# Patient Record
Sex: MALE | Race: OTHER | Employment: FULL TIME | URBAN - METROPOLITAN AREA
[De-identification: names, ages, dates, MRNs, and addresses within clinical notes are randomized per-mention and may not be internally consistent; named-entity substitution may affect disease eponyms.]

---
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
## 2018-11-03 ENCOUNTER — HOSPITAL ENCOUNTER (EMERGENCY)
Age: 18
Discharge: HOME | End: 2018-11-03
Payer: COMMERCIAL

## 2018-11-03 VITALS
TEMPERATURE: 99.6 F | RESPIRATION RATE: 16 BRPM | WEIGHT: 140 LBS | SYSTOLIC BLOOD PRESSURE: 134 MMHG | HEART RATE: 105 BPM | HEIGHT: 67 IN | DIASTOLIC BLOOD PRESSURE: 44 MMHG | OXYGEN SATURATION: 98 % | BODY MASS INDEX: 21.97 KG/M2

## 2018-11-03 DIAGNOSIS — J02.9 ACUTE PHARYNGITIS, UNSPECIFIED ETIOLOGY: ICD-10-CM

## 2018-11-03 DIAGNOSIS — J06.9 ACUTE UPPER RESPIRATORY INFECTION: Primary | ICD-10-CM

## 2018-11-03 LAB — S PYO AG THROAT QL: NEGATIVE

## 2018-11-03 PROCEDURE — 87081 CULTURE SCREEN ONLY: CPT

## 2018-11-03 PROCEDURE — 99283 EMERGENCY DEPT VISIT LOW MDM: CPT

## 2018-11-03 PROCEDURE — 87880 STREP A ASSAY W/OPTIC: CPT

## 2018-11-03 RX ORDER — IBUPROFEN 800 MG/1
800 TABLET ORAL EVERY 8 HOURS PRN
Qty: 20 TABLET | Refills: 0 | Status: SHIPPED | OUTPATIENT
Start: 2018-11-03 | End: 2022-10-15

## 2018-11-03 RX ORDER — AZITHROMYCIN 250 MG/1
TABLET, FILM COATED ORAL
Qty: 6 TABLET | Refills: 0 | Status: SHIPPED | OUTPATIENT
Start: 2018-11-03 | End: 2018-11-13

## 2018-11-03 ASSESSMENT — ENCOUNTER SYMPTOMS
ABDOMINAL PAIN: 0
SORE THROAT: 1
BACK PAIN: 0
DIARRHEA: 0
COUGH: 1
SINUS PAIN: 0
VOMITING: 0
NAUSEA: 0
WHEEZING: 0
TROUBLE SWALLOWING: 0
SHORTNESS OF BREATH: 0

## 2018-11-03 ASSESSMENT — PAIN DESCRIPTION - DESCRIPTORS: DESCRIPTORS: SORE

## 2018-11-03 ASSESSMENT — PAIN SCALES - GENERAL: PAINLEVEL_OUTOF10: 6

## 2018-11-03 ASSESSMENT — PAIN DESCRIPTION - PROGRESSION: CLINICAL_PROGRESSION: GRADUALLY WORSENING

## 2018-11-03 ASSESSMENT — PAIN DESCRIPTION - PAIN TYPE: TYPE: ACUTE PAIN

## 2018-11-03 ASSESSMENT — PAIN DESCRIPTION - LOCATION: LOCATION: THROAT

## 2018-11-03 ASSESSMENT — PAIN DESCRIPTION - ONSET: ONSET: ON-GOING

## 2018-11-03 ASSESSMENT — PAIN DESCRIPTION - FREQUENCY: FREQUENCY: CONTINUOUS

## 2018-11-05 LAB — S PYO THROAT QL CULT: NORMAL

## 2019-06-08 ENCOUNTER — HOSPITAL ENCOUNTER (EMERGENCY)
Age: 19
Discharge: HOME | End: 2019-06-08
Attending: EMERGENCY MEDICINE
Payer: COMMERCIAL

## 2019-06-08 VITALS
RESPIRATION RATE: 18 BRPM | DIASTOLIC BLOOD PRESSURE: 76 MMHG | TEMPERATURE: 97.1 F | SYSTOLIC BLOOD PRESSURE: 125 MMHG | OXYGEN SATURATION: 97 % | HEIGHT: 67 IN | HEART RATE: 78 BPM | WEIGHT: 158 LBS | BODY MASS INDEX: 24.8 KG/M2

## 2019-06-08 DIAGNOSIS — L50.9 URTICARIA: Primary | ICD-10-CM

## 2019-06-08 PROCEDURE — 99282 EMERGENCY DEPT VISIT SF MDM: CPT

## 2019-06-08 RX ORDER — FAMOTIDINE 20 MG/1
20 TABLET, FILM COATED ORAL DAILY
Qty: 15 TABLET | Refills: 0 | Status: SHIPPED | OUTPATIENT
Start: 2019-06-08 | End: 2019-06-23

## 2019-06-08 RX ORDER — PREDNISONE 10 MG/1
50 TABLET ORAL DAILY
Qty: 25 TABLET | Refills: 0 | Status: SHIPPED | OUTPATIENT
Start: 2019-06-08 | End: 2019-06-13

## 2019-06-08 RX ORDER — DIPHENHYDRAMINE HCL 25 MG
25 CAPSULE ORAL EVERY 4 HOURS PRN
Qty: 20 CAPSULE | Refills: 0 | Status: SHIPPED | OUTPATIENT
Start: 2019-06-08 | End: 2019-06-18

## 2019-06-08 ASSESSMENT — ENCOUNTER SYMPTOMS
SHORTNESS OF BREATH: 0
WHEEZING: 0
BACK PAIN: 0
COUGH: 0
ABDOMINAL PAIN: 0

## 2019-06-08 NOTE — ED PROVIDER NOTES
[address] [facility] ED  eMERGENCY dEPARTMENT eNCOUnter      Pt Name: Magaly Escobar  MRN: [id number]  [place] 2000  Date of evaluation: 6/8/2019  Provider: JESICA Molina Ma, CNP      HISTORY OF PRESENT ILLNESS    Magaly Escobar is a 19 y.o. male who presents to the Emergency Department with hives that started this evening. He did start using a new body wash recently. He denies any new foods, medications or products otherwise. He is not having any trouble breathing, SOB or wheezing. He took 50 mg of Benadryl PTA and rash is gone. REVIEW OF SYSTEMS       Review of Systems   Constitutional: Negative for activity change, appetite change and fever. HENT: Negative for congestion. Respiratory: Negative for cough, shortness of breath and wheezing. Cardiovascular: Negative for chest pain. Gastrointestinal: Negative for abdominal pain. Genitourinary: Negative for dysuria. Musculoskeletal: Negative for arthralgias and back pain. Skin: Positive for rash. All other systems reviewed and are negative. PAST MEDICAL HISTORY     Past Medical History:   Diagnosis Date    Acne     Eczema          SURGICAL HISTORY     History reviewed. No pertinent surgical history. CURRENT MEDICATIONS       Previous Medications    IBUPROFEN (IBU) 800 MG TABLET    Take 1 tablet by mouth every 8 hours as needed for Pain       ALLERGIES     Tylenol [acetaminophen]    FAMILY HISTORY     History reviewed. No pertinent family history.        SOCIAL HISTORY       Social History     Socioeconomic History    Marital status: Single     Spouse name: None    Number of children: None    Years of education: None    Highest education level: None   Occupational History    None   Social Needs    Financial resource strain: None    Food insecurity:     Worry: None     Inability: None    Transportation needs:     Medical: None     Non-medical: None   Tobacco Use    Smoking status: Never Smoker    Smokeless or not returned as of this dictation. EMERGENCY DEPARTMENT COURSE and DIFFERENTIALDIAGNOSIS/MDM:   Vitals:    Vitals:    06/08/19 1921   BP: 125/76   Pulse: 78   Resp: 18   Temp: 97.1 °F (36.2 °C)   TempSrc: Oral   SpO2: 97%   Weight: 158 lb (71.7 kg)   Height: 5' 6.5\" (1.689 m)            19 yr old male with urticaria. Prescriptions for Prednisone, Benadryl and Pepcid was given to the patient. F/U with PCP in 2-3 days. Patient verbalizes understanding. PROCEDURES:  Unless otherwise noted below, none     Procedures      FINAL IMPRESSION      1.  Urticaria          DISPOSITION/PLAN   DISPOSITION Decision To Discharge 06/08/2019 07:51:48 PM          JESICA Toth CNP (electronically signed)  Attending Emergency Physician     JESICA Toth CNP  06/08/19 1958

## 2019-06-08 NOTE — ED TRIAGE NOTES
Pt to ER with c/o rash that started a little while ago, rash is on pt back and legs, bumps were bigger but have gone down according to pt friend, pt states that they itch, pt a&ox4, resp even and unlabored

## 2020-10-28 ENCOUNTER — APPOINTMENT (OUTPATIENT)
Dept: GENERAL RADIOLOGY | Age: 20
End: 2020-10-28
Payer: COMMERCIAL

## 2020-10-28 ENCOUNTER — HOSPITAL ENCOUNTER (EMERGENCY)
Age: 20
Discharge: HOME | End: 2020-10-28
Payer: COMMERCIAL

## 2020-10-28 VITALS
SYSTOLIC BLOOD PRESSURE: 113 MMHG | HEIGHT: 67 IN | HEART RATE: 60 BPM | WEIGHT: 186 LBS | DIASTOLIC BLOOD PRESSURE: 76 MMHG | RESPIRATION RATE: 18 BRPM | OXYGEN SATURATION: 98 % | BODY MASS INDEX: 29.19 KG/M2 | TEMPERATURE: 97.4 F

## 2020-10-28 LAB
ABO/RH: NORMAL
ALBUMIN SERPL-MCNC: 4.6 G/DL (ref 3.5–4.6)
ALP BLD-CCNC: 95 U/L (ref 35–104)
ALT SERPL-CCNC: 21 U/L (ref 0–41)
ANION GAP SERPL CALCULATED.3IONS-SCNC: 12 MEQ/L (ref 9–15)
ANTIBODY SCREEN: NORMAL
AST SERPL-CCNC: 23 U/L (ref 0–40)
BASOPHILS ABSOLUTE: 0.1 K/UL (ref 0–0.2)
BASOPHILS RELATIVE PERCENT: 0.8 %
BILIRUB SERPL-MCNC: 0.3 MG/DL (ref 0.2–0.7)
BUN BLDV-MCNC: 19 MG/DL (ref 6–20)
CALCIUM SERPL-MCNC: 9.8 MG/DL (ref 8.5–9.9)
CHLORIDE BLD-SCNC: 104 MEQ/L (ref 95–107)
CO2: 25 MEQ/L (ref 20–31)
CREAT SERPL-MCNC: 0.91 MG/DL (ref 0.7–1.2)
EOSINOPHILS ABSOLUTE: 0.2 K/UL (ref 0–0.7)
EOSINOPHILS RELATIVE PERCENT: 2.8 %
GFR AFRICAN AMERICAN: >60
GFR NON-AFRICAN AMERICAN: >60
GLOBULIN: 2.7 G/DL (ref 2.3–3.5)
GLUCOSE BLD-MCNC: 100 MG/DL (ref 70–99)
HCT VFR BLD CALC: 42.8 % (ref 42–52)
HEMOGLOBIN: 14.4 G/DL (ref 14–18)
LYMPHOCYTES ABSOLUTE: 2.5 K/UL (ref 1–4.8)
LYMPHOCYTES RELATIVE PERCENT: 28.9 %
MCH RBC QN AUTO: 26 PG (ref 27–31.3)
MCHC RBC AUTO-ENTMCNC: 33.7 % (ref 33–37)
MCV RBC AUTO: 77.3 FL (ref 80–100)
MONOCYTES ABSOLUTE: 0.7 K/UL (ref 0.2–0.8)
MONOCYTES RELATIVE PERCENT: 7.9 %
NEUTROPHILS ABSOLUTE: 5.2 K/UL (ref 1.4–6.5)
NEUTROPHILS RELATIVE PERCENT: 59.6 %
PDW BLD-RTO: 13.3 % (ref 11.5–14.5)
PLATELET # BLD: 218 K/UL (ref 130–400)
POTASSIUM SERPL-SCNC: 3.8 MEQ/L (ref 3.4–4.9)
RBC # BLD: 5.54 M/UL (ref 4.7–6.1)
SODIUM BLD-SCNC: 141 MEQ/L (ref 135–144)
TOTAL PROTEIN: 7.3 G/DL (ref 6.3–8)
WBC # BLD: 8.8 K/UL (ref 4.8–10.8)

## 2020-10-28 PROCEDURE — 99283 EMERGENCY DEPT VISIT LOW MDM: CPT

## 2020-10-28 PROCEDURE — 80053 COMPREHEN METABOLIC PANEL: CPT

## 2020-10-28 PROCEDURE — 86901 BLOOD TYPING SEROLOGIC RH(D): CPT

## 2020-10-28 PROCEDURE — 36415 COLL VENOUS BLD VENIPUNCTURE: CPT

## 2020-10-28 PROCEDURE — 86850 RBC ANTIBODY SCREEN: CPT

## 2020-10-28 PROCEDURE — 85025 COMPLETE CBC W/AUTO DIFF WBC: CPT

## 2020-10-28 PROCEDURE — 71045 X-RAY EXAM CHEST 1 VIEW: CPT

## 2020-10-28 PROCEDURE — 86900 BLOOD TYPING SEROLOGIC ABO: CPT

## 2020-10-28 RX ORDER — BENZONATATE 100 MG/1
100 CAPSULE ORAL 3 TIMES DAILY PRN
Qty: 20 CAPSULE | Refills: 0 | Status: SHIPPED | OUTPATIENT
Start: 2020-10-28

## 2020-10-28 RX ORDER — AZITHROMYCIN 250 MG/1
TABLET, FILM COATED ORAL
Qty: 1 PACKET | Refills: 0 | Status: SHIPPED | OUTPATIENT
Start: 2020-10-28 | End: 2020-11-01

## 2020-10-28 SDOH — HEALTH STABILITY: MENTAL HEALTH: HOW OFTEN DO YOU HAVE A DRINK CONTAINING ALCOHOL?: NEVER

## 2020-10-28 ASSESSMENT — ENCOUNTER SYMPTOMS
EYE REDNESS: 0
DIARRHEA: 0
SHORTNESS OF BREATH: 0
BLOOD IN STOOL: 0
CONSTIPATION: 0
EYE DISCHARGE: 0
VOMITING: 0
SORE THROAT: 0
TROUBLE SWALLOWING: 0
WHEEZING: 0
COUGH: 1
ABDOMINAL PAIN: 0
EYE PAIN: 0
COLOR CHANGE: 0
BACK PAIN: 0
NAUSEA: 0
RHINORRHEA: 0

## 2020-10-28 NOTE — ED TRIAGE NOTES
Pt states that he woke up this morning and was coughing up blood. Pt states blood was  Bright red and a small amount.

## 2020-10-28 NOTE — ED PROVIDER NOTES
[address] [facility] ED  EMERGENCY DEPARTMENT ENCOUNTER      Pt Name: Mariela Mireles  MRN: [id number]  [place] 2000  Date of evaluation: 10/28/2020  Provider: JESICA Newman CNP    CHIEF COMPLAINT       Chief Complaint   Patient presents with    Hemoptysis     started today bright red          HISTORY OF PRESENT ILLNESS   (Location/Symptom, Timing/Onset,Context/Setting, Quality, Duration, Modifying Factors, Severity)  Note limiting factors. Mariela Mireles is a 23 y.o. male who presents to the emergency department with a chart reviewed past medical history of eczema and acne for a chief complaint of sudden onset of hemoptysis x2 episodes that started today. Patient states it is bright red and not blood-tinged mucus. He states that prior to that he was coughing but not a lot. He denies any shortness of breath, chest pain, nausea or vomiting. He has no fevers or chills. No night sweats. No other complaints at this time. Nursing Notes were reviewed. REVIEW OF SYSTEMS    (2-9 systems for level 4, 10 or more for level 5)     Review of Systems   Constitutional: Negative for activity change, appetite change, fatigue and fever. HENT: Negative for congestion, ear pain, rhinorrhea, sore throat and trouble swallowing. Eyes: Negative for pain, discharge and redness. Respiratory: Positive for cough. Negative for shortness of breath and wheezing. Cardiovascular: Negative for chest pain and palpitations. Gastrointestinal: Negative for abdominal pain, blood in stool, constipation, diarrhea, nausea and vomiting. Endocrine: Negative for polydipsia and polyuria. Genitourinary: Negative for decreased urine volume, dysuria, flank pain and hematuria. Musculoskeletal: Negative for arthralgias, back pain and myalgias. Skin: Negative for color change, rash and wound. Neurological: Negative for dizziness, syncope, weakness, light-headedness and headaches.    Psychiatric/Behavioral: Negative for behavioral problems. All other systems reviewed and are negative. Except as noted above the remainder of the review of systems was reviewed and negative. PAST MEDICAL HISTORY     Past Medical History:   Diagnosis Date    Acne     Eczema      History reviewed. No pertinent surgical history. Social History     Socioeconomic History    Marital status: Single     Spouse name: None    Number of children: None    Years of education: None    Highest education level: None   Occupational History    None   Social Needs    Financial resource strain: None    Food insecurity     Worry: None     Inability: None    Transportation needs     Medical: None     Non-medical: None   Tobacco Use    Smoking status: Never Smoker    Smokeless tobacco: Never Used   Substance and Sexual Activity    Alcohol use: Never     Frequency: Never    Drug use: No    Sexual activity: None   Lifestyle    Physical activity     Days per week: None     Minutes per session: None    Stress: None   Relationships    Social connections     Talks on phone: None     Gets together: None     Attends Sikhism service: None     Active member of club or organization: None     Attends meetings of clubs or organizations: None     Relationship status: None    Intimate partner violence     Fear of current or ex partner: None     Emotionally abused: None     Physically abused: None     Forced sexual activity: None   Other Topics Concern    None   Social History Narrative    None       SCREENINGS             PHYSICAL EXAM    (up to 7 for level 4, 8 or more for level 5)     ED Triage Vitals [10/28/20 0647]   BP Temp Temp Source Pulse Resp SpO2 Height Weight   127/82 97.4 °F (36.3 °C) Oral 83 20 96 % 5' 7\" (1.702 m) 186 lb (84.4 kg)       Physical Exam  Vitals signs and nursing note reviewed. Constitutional:       General: He is not in acute distress. Appearance: He is well-developed. He is not diaphoretic.    HENT:      Head: Normocephalic and atraumatic. Nose: Nose normal.   Eyes:      Conjunctiva/sclera: Conjunctivae normal.      Pupils: Pupils are equal, round, and reactive to light. Neck:      Musculoskeletal: Normal range of motion and neck supple. Cardiovascular:      Rate and Rhythm: Normal rate and regular rhythm. Heart sounds: Normal heart sounds. Pulmonary:      Effort: Pulmonary effort is normal. No respiratory distress. Breath sounds: Normal breath sounds. No wheezing. Abdominal:      General: Bowel sounds are normal.      Palpations: Abdomen is soft. Tenderness: There is no abdominal tenderness. Skin:     General: Skin is warm and dry. Capillary Refill: Capillary refill takes less than 2 seconds. Findings: No rash. Neurological:      Mental Status: He is alert and oriented to person, place, and time. Cranial Nerves: No cranial nerve deficit.    Psychiatric:         Behavior: Behavior normal.         RESULTS     EKG: All EKG's are interpreted by the Emergency Department Physician who either signs or Co-signsthis chart in the absence of a cardiologist.        RADIOLOGY:   Yesika Henson such as CT, Ultrasound and MRI are read by the radiologist. Plain radiographic images are visualized and preliminarily interpreted by the emergency physician with the below findings:    NAD    Interpretation per the Radiologist below, if available at the time ofthis note:    XR CHEST PORTABLE    (Results Pending)         ED BEDSIDE ULTRASOUND:   Performed by ED Physician - none    LABS:  Labs Reviewed   COMPREHENSIVE METABOLIC PANEL - Abnormal; Notable for the following components:       Result Value    Glucose 100 (*)     All other components within normal limits   CBC WITH AUTO DIFFERENTIAL - Abnormal; Notable for the following components:    MCV 77.3 (*)     MCH 26.0 (*)     All other components within normal limits   TYPE AND SCREEN       All other labs were within normal range or not returned as of this dictation. EMERGENCY DEPARTMENT COURSE and DIFFERENTIAL DIAGNOSIS/MDM:   Vitals:    Vitals:    10/28/20 0647 10/28/20 0745   BP: 127/82 113/76   Pulse: 83 60   Resp: 20 18   Temp: 97.4 °F (36.3 °C)    TempSrc: Oral    SpO2: 96% 98%   Weight: 186 lb (84.4 kg)    Height: 5' 7\" (1.702 m)             MDM   Patient is 53-year-old male presenting to the ER with a chief complaint of hemoptysis. Hemodynamically is nontoxic-appearing. Lab  Work unremarkable. Patient's hemoptysis likely related to bronchitis. Gave patient zpack and tessalon perls. Patient dc in a stable condition with stable vital signs. CRITICAL CARE TIME       CONSULTS:  None    PROCEDURES:  Unless otherwise noted below, none     Procedures    FINAL IMPRESSION      1. Bronchitis          DISPOSITION/PLAN   DISPOSITION Decision To Discharge 10/28/2020 08:26:05 AM      PATIENT REFERRED TO:  Maude Zapata MD  [address] [facility] [id number] [phone]    Schedule an appointment as soon as possible for a visit in 1 day        DISCHARGE MEDICATIONS:  New Prescriptions    AZITHROMYCIN (ZITHROMAX Z-SALVADOR) 250 MG TABLET    Take 2 tablets (500 mg) on Day 1, and then take 1 tablet (250 mg) on days 2 through 5.     BENZONATATE (TESSALON PERLES) 100 MG CAPSULE    Take 1 capsule by mouth 3 times daily as needed for Cough          (Please notethat portions of this note were completed with a voice recognition program.  Efforts were made to edit the dictations but occasionally words are mis-transcribed.)    Radha Magana, JESICA - CNP (electronically signed)  Attending Emergency Physician          [facility] [place] - CNP  10/28/20 4868

## 2020-11-07 ENCOUNTER — HOSPITAL ENCOUNTER (EMERGENCY)
Age: 20
Discharge: HOME | End: 2020-11-08
Payer: COMMERCIAL

## 2020-11-07 VITALS
HEIGHT: 67 IN | TEMPERATURE: 98.6 F | BODY MASS INDEX: 29.19 KG/M2 | RESPIRATION RATE: 16 BRPM | WEIGHT: 186 LBS | SYSTOLIC BLOOD PRESSURE: 120 MMHG | DIASTOLIC BLOOD PRESSURE: 75 MMHG | OXYGEN SATURATION: 100 % | HEART RATE: 97 BPM

## 2020-11-07 PROCEDURE — 99284 EMERGENCY DEPT VISIT MOD MDM: CPT

## 2020-11-07 PROCEDURE — U0002 COVID-19 LAB TEST NON-CDC: HCPCS

## 2020-11-08 ENCOUNTER — APPOINTMENT (OUTPATIENT)
Dept: GENERAL RADIOLOGY | Age: 20
End: 2020-11-08
Payer: COMMERCIAL

## 2020-11-08 LAB
INFLUENZA A BY PCR: NEGATIVE
INFLUENZA B BY PCR: NEGATIVE
SARS-COV-2, NAAT: DETECTED

## 2020-11-08 PROCEDURE — 87502 INFLUENZA DNA AMP PROBE: CPT

## 2020-11-08 PROCEDURE — 99284 EMERGENCY DEPT VISIT MOD MDM: CPT

## 2020-11-08 PROCEDURE — 71045 X-RAY EXAM CHEST 1 VIEW: CPT

## 2020-11-08 RX ORDER — DEXTROMETHORPHAN HYDROBROMIDE AND PROMETHAZINE HYDROCHLORIDE 15; 6.25 MG/5ML; MG/5ML
5 SYRUP ORAL 4 TIMES DAILY PRN
Qty: 140 ML | Refills: 0 | Status: SHIPPED | OUTPATIENT
Start: 2020-11-08 | End: 2020-11-15

## 2020-11-08 ASSESSMENT — ENCOUNTER SYMPTOMS
VOMITING: 0
EYE PAIN: 0
ALLERGIC/IMMUNOLOGIC NEGATIVE: 1
COUGH: 1
SHORTNESS OF BREATH: 0
TROUBLE SWALLOWING: 0
APNEA: 0
COLOR CHANGE: 0
ABDOMINAL PAIN: 0

## 2020-11-08 NOTE — ED PROVIDER NOTES
[address] [facility] ED  eMERGENCYdEPARTMENT eNCOUnter      Pt Name: Yobani Salazar  MRN: [id number]  [place] 2000  Date of evaluation: 11/7/2020  Provider:Chuck Hutchison PA-C    CHIEF COMPLAINT       Chief Complaint   Patient presents with    Cough         HISTORY OF PRESENT ILLNESS  (Location/Symptom, Timing/Onset, Context/Setting, Quality, Duration, Modifying Factors, Severity.)   Yobani Salazar is a 23 y.o. male who presents to the emergency department with greater than 1 week history of cough and congestion. Patient was seen in the emergency department at symptom onset last week and diagnosed with bronchitis. Patient finished azithromycin but symptoms continued so talk to the family doctor who prescribed doxycycline. Patient took his first dose of doxycycline today but states that his symptoms were not any better and is specifically requesting testing for the flu and Covid as the patient states that today he lost his sense of smell and taste    HPI    Nursing Notes were reviewed and I agree. REVIEW OF SYSTEMS    (2-9 systems for level 4, 10 or more for level 5)     Review of Systems   Constitutional: Positive for fatigue. Negative for diaphoresis and fever. HENT: Positive for congestion. Negative for hearing loss and trouble swallowing. Eyes: Negative for pain. Respiratory: Positive for cough. Negative for apnea and shortness of breath. Cardiovascular: Negative for chest pain. Gastrointestinal: Negative for abdominal pain and vomiting. Endocrine: Negative. Genitourinary: Negative for hematuria. Musculoskeletal: Positive for myalgias. Negative for neck pain and neck stiffness. Skin: Negative for color change. Allergic/Immunologic: Negative. Neurological: Negative for dizziness and numbness. Hematological: Negative. Psychiatric/Behavioral: Negative. All other systems reviewed and are negative.        Except as noted above the remainder of the review of systems was reviewed and negative. PAST MEDICAL HISTORY     Past Medical History:   Diagnosis Date    Acne     Eczema          SURGICAL HISTORY     No past surgical history on file. CURRENT MEDICATIONS       Previous Medications    BENZONATATE (TESSALON PERLES) 100 MG CAPSULE    Take 1 capsule by mouth 3 times daily as needed for Cough    FAMOTIDINE (PEPCID) 20 MG TABLET    Take 1 tablet by mouth daily for 15 days    IBUPROFEN (IBU) 800 MG TABLET    Take 1 tablet by mouth every 8 hours as needed for Pain       ALLERGIES     Augmentin [amoxicillin-pot clavulanate]; Red dye; and Tylenol [acetaminophen]    FAMILY HISTORY     No family history on file.        SOCIAL HISTORY       Social History     Socioeconomic History    Marital status: Single     Spouse name: Not on file    Number of children: Not on file    Years of education: Not on file    Highest education level: Not on file   Occupational History    Not on file   Social Needs    Financial resource strain: Not on file    Food insecurity     Worry: Not on file     Inability: Not on file    Transportation needs     Medical: Not on file     Non-medical: Not on file   Tobacco Use    Smoking status: Never Smoker    Smokeless tobacco: Never Used   Substance and Sexual Activity    Alcohol use: Never     Frequency: Never    Drug use: No    Sexual activity: Not on file   Lifestyle    Physical activity     Days per week: Not on file     Minutes per session: Not on file    Stress: Not on file   Relationships    Social connections     Talks on phone: Not on file     Gets together: Not on file     Attends Yarsani service: Not on file     Active member of club or organization: Not on file     Attends meetings of clubs or organizations: Not on file     Relationship status: Not on file    Intimate partner violence     Fear of current or ex partner: Not on file     Emotionally abused: Not on file     Physically abused: Not on file     Forced sexual activity: Not on file   Other Topics Concern    Not on file   Social History Narrative    Not on file       SCREENINGS    Alfred Coma Scale  Eye Opening: Spontaneous  Best Verbal Response: Oriented  Best Motor Response: Obeys commands  Gem Coma Scale Score: 15      PHYSICAL EXAM    (up to 7 forlevel 4, 8 or more for level 5)     ED Triage Vitals [11/07/20 2328]   BP Temp Temp Source Pulse Resp SpO2 Height Weight   120/75 98.6 °F (37 °C) Oral 97 16 100 % 5' 7\" (1.702 m) 186 lb (84.4 kg)       Physical Exam  Vitals signs and nursing note reviewed. Constitutional:       General: He is not in acute distress. Appearance: He is well-developed. He is not diaphoretic. HENT:      Head: Normocephalic and atraumatic. Mouth/Throat:      Pharynx: No oropharyngeal exudate. Eyes:      General: No scleral icterus. Conjunctiva/sclera: Conjunctivae normal.      Pupils: Pupils are equal, round, and reactive to light. Neck:      Musculoskeletal: Normal range of motion and neck supple. Trachea: No tracheal deviation. Cardiovascular:      Rate and Rhythm: Normal rate. Heart sounds: Normal heart sounds. Pulmonary:      Effort: Pulmonary effort is normal. No respiratory distress. Breath sounds: Normal breath sounds. Abdominal:      General: Bowel sounds are normal. There is no distension. Palpations: Abdomen is soft. Musculoskeletal: Normal range of motion. Skin:     General: Skin is warm and dry. Findings: No erythema or rash. Neurological:      Mental Status: He is alert and oriented to person, place, and time. Cranial Nerves: No cranial nerve deficit. Motor: No abnormal muscle tone. Psychiatric:         Behavior: Behavior normal.         Thought Content:  Thought content normal.         Judgment: Judgment normal.           DIAGNOSTIC RESULTS     RADIOLOGY:   Non-plain film images such as CT, Ultrasound and MRI are read by the radiologist. Plain radiographic images are visualized and preliminarilyinterpreted by Darius Akers PA-C with the below findings:    Neg    Interpretation per the Radiologist below, if available at the time of this note:    XR CHEST PORTABLE    (Results Pending)       LABS:  Labs Reviewed   COVID-19 - Abnormal; Notable for the following components:       Result Value    SARS-CoV-2, NAAT DETECTED (*)     All other components within normal limits    Narrative:     Claudia Bryan tel. [phone],  COVID results called to and read back by PALAK WU RN, 11/08/2020 00:57, by  RANULFO   RAPID INFLUENZA A/B ANTIGENS       All other labs were within normal range or not returnedas of this dictation. EMERGENCYDEPARTMENT COURSE and DIFFERENTIAL DIAGNOSIS/MDM:   Vitals:    Vitals:    11/07/20 2328   BP: 120/75   Pulse: 97   Resp: 16   Temp: 98.6 °F (37 °C)   TempSrc: Oral   SpO2: 100%   Weight: 186 lb (84.4 kg)   Height: 5' 7\" (1.702 m)       REASSESSMENT      Patient presented with a 1 week history of cough and congestion. Patient is Covid positive today. Patient is already on doxycycline antibiotic at home. I will give the patient prescription for antitussives. I discussed quarantining at home for the next 10 days.   Return for any worsening symptoms    MDM    PROCEDURES:    Procedures      FINAL IMPRESSION      1. COVID-19          DISPOSITION/PLAN   DISPOSITION Decision To Discharge 11/08/2020 01:06:06 AM      PATIENT REFERRED TO:  [facility]  [address]  [phone]  Call in 2 days        DISCHARGE MEDICATIONS:  New Prescriptions    PROMETHAZINE-DEXTROMETHORPHAN (PROMETHAZINE-DM) 6.25-15 MG/5ML SYRUP    Take 5 mLs by mouth 4 times daily as needed for Cough       (Please note that portions of this note were completed with a voice recognition program.  Efforts were made to edit the dictations but occasionally words are mis-transcribed.)    RAÚL Ribeiro PA-C  11/08/20 0143

## 2020-11-09 ENCOUNTER — CARE COORDINATION (OUTPATIENT)
Dept: CARE COORDINATION | Age: 20
End: 2020-11-09

## 2020-11-10 ENCOUNTER — CARE COORDINATION (OUTPATIENT)
Dept: CARE COORDINATION | Age: 20
End: 2020-11-10

## 2020-11-10 NOTE — CARE COORDINATION
Patient contacted regarding UOUSG-01 diagnosis\". Discussed COVID-19 related testing which was available at this time. Test results were positive. Patient informed of results, if available? Yes and Completed 2020    Care Transition Nurse/ Ambulatory Care Manager contacted the patient by telephone to perform post discharge assessment. Call within 2 business days of discharge: Yes. Verified name and  with patient as identifiers. Provided introduction to self, and explanation of the CTN/ACM role, and reason for call due to risk factors for infection and/or exposure to COVID-19. Symptoms reviewed with patient who verbalized the following symptoms: fatigue, pain or aching joints, no new symptoms and no worsening symptoms. Due to no new or worsening symptoms encounter was not routed to provider for escalation. Discussed follow-up appointments. If no appointment was previously scheduled, appointment scheduling offered: Yes and declined  [facility] follow up appointment(s): No future appointments. Non-[facility] follow up appointment(s):     Non-face-to-face services provided:  Obtained and reviewed discharge summary and/or continuity of care documents     Advance Care Planning:   Does patient have an Advance Directive:  reviewed and current. Patient has following risk factors of: asthma. CTN/ACM reviewed discharge instructions, medical action plan and red flags such as increased shortness of breath, increasing fever and signs of decompensation with patient who verbalized understanding. Discussed exposure protocols and quarantine with CDC Guidelines What to do if you are sick with coronavirus disease .  Patient was given an opportunity for questions and concerns. The patient agrees to contact the [facility] exposure line [phone], [address] of [facility]: ([phone]) and PCP office for questions related to their healthcare.  CTN/ACM provided contact information for future needs.    Reviewed and educated patient on any new and changed medications related to discharge diagnosis     Patient/family/caregiver given information for GetWell Loop and agrees to enroll no  Patient's preferred e-mail:   Patient's preferred phone number:   Based on Loop alert triggers, patient will be contacted by nurse care manager for worsening symptoms. Plan for follow-up call in 5-7 days based on severity of symptoms and risk factors. Cris Conley tells me that he is feeling much better today. He says that he does have some fatigue and intermittent issues with joint pain. He is taking the medication prescribed. I spoke with him about being in quarantine and he states that he is but the ER doctor told him that he can end this on 10/16/2020 and  He plans to go back to work. I spoke with him at length about symptoms waiting to return to work until he is symptom free for 24-48 hours. His girlfriend is in the background stating that they will follow the advise of the doctor in the ER  I have asked him to call me with any questions or concerns.

## 2020-11-15 ENCOUNTER — CARE COORDINATION (OUTPATIENT)
Dept: CARE COORDINATION | Age: 20
End: 2020-11-15

## 2020-11-15 NOTE — CARE COORDINATION
Patient contacted regarding COVID-19 risk and screening. Discussed COVID-19 related testing which was available at this time. Test results were positive. Patient informed of results, if available? Yes and Completed 2020. Care Transition Nurse/ Ambulatory Care Manager contacted the patient by telephone to perform follow-up assessment. Verified name and  with patient as identifiers. Patient has following risk factors of: asthma. Symptoms reviewed with patient who verbalized the following symptoms: no new symptoms and no worsening symptoms. Due to no new or worsening symptoms encounter was not routed to provider for escalation. Education provided regarding infection prevention, and signs and symptoms of COVID-19 and when to seek medical attention with patient who verbalized understanding. Discussed exposure protocols and quarantine from [address] you at higher risk for severe illness  and given an opportunity for questions and concerns. The patient agrees to contact the COVID-19 hotline [phone] or PCP office for questions related to their healthcare. CTN/ACM provided contact information for future reference. From CDC: Are you at higher risk for severe illness?  Wash your hands often.  Avoid close contact (6 feet, which is about two arm lengths) with people who are sick.  Put distance between yourself and other people if COVID-19 is spreading in your community.  Clean and disinfect frequently touched surfaces.  Avoid all cruise travel and non-essential air travel.  Call your healthcare professional if you have concerns about COVID-19 and your underlying condition or if you are sick. For more information on steps you can take to protect yourself, see CDC's How to [place] for follow-up call in 7-14 days based on severity of symptoms and risk factors. Tam Robertson returned my calls.   He tells me that he is feeling almost back to normal.  He says that he will get tired with doing activities. He denies any issues with fever, chills, SOB, wheezing, cough or other related symptoms. He says that he will be going back to work tomorrow. I have asked him to monitor his symptoms for any changes or worsening. I have also asked him to call me with any questions or concerns.

## 2020-11-23 ENCOUNTER — CARE COORDINATION (OUTPATIENT)
Dept: CARE COORDINATION | Age: 20
End: 2020-11-23

## 2020-11-23 NOTE — CARE COORDINATION
Attempted to contact patient for post ED COVID-19 Monitoring. Unable to reach patient by phone. Phone rings busy. I will make another attempt to contact him tomorrow.

## 2020-11-24 ENCOUNTER — CARE COORDINATION (OUTPATIENT)
Dept: CARE COORDINATION | Age: 20
End: 2020-11-24

## 2020-11-24 NOTE — CARE COORDINATION
You Patient resolved from the Care Transitions episode on 11/24/2020  Discussed COVID-19 related testing which was available at this time. Test results were positive. Patient informed of results, if available? Yes and Completed 11/7/2020    Patient/family has been provided the following resources and education related to COVID-19:                         Signs, symptoms and red flags related to COVID-19            [facility] exposure and quarantine guidelines            Conduit exposure contact - [phone]            Contact for their local Department of Health                 Patient currently reports that the following symptoms have improved:  no new/worsening symptoms     No further outreach scheduled with this CTN/ACM. Episode of Care resolved. Patient has this CTN/ACM contact information if future needs arise. Satnam Cho

## 2021-12-27 ENCOUNTER — VIRTUAL VISIT (OUTPATIENT)
Dept: FAMILY MEDICINE CLINIC | Age: 21
End: 2021-12-27
Payer: COMMERCIAL

## 2021-12-27 DIAGNOSIS — U07.1 COVID-19 VIRUS INFECTION: Primary | ICD-10-CM

## 2021-12-27 PROCEDURE — 99441 PR PHYS/QHP TELEPHONE EVALUATION 5-10 MIN: CPT | Performed by: PHYSICIAN ASSISTANT

## 2021-12-27 ASSESSMENT — ENCOUNTER SYMPTOMS
SINUS PRESSURE: 0
CHEST TIGHTNESS: 0
SHORTNESS OF BREATH: 0
COUGH: 0
VOMITING: 0
DIARRHEA: 0
ABDOMINAL PAIN: 0
TROUBLE SWALLOWING: 0
BACK PAIN: 0

## 2021-12-27 NOTE — PROGRESS NOTES
TELEHEALTH EVALUATION -- Audio/Visual (During OYCVI-67 public health emergency)    -   Zee Somers is a 19 y.o. male being evaluated by a Virtual Visit (video visit) encounter to address concerns as mentioned above. A caregiver was present when appropriate. Due to this being a TeleHealth encounter (During ABFFL-59 public health emergency), evaluation of the following organ systems was limited: Vitals/Constitutional/EENT/Resp/CV/GI//MS/Neuro/Skin/Heme-Lymph-Imm. Pursuant to the emergency declaration under the [address] [place] [address], [address] [place] [address] authority and the [employer] Act, this Virtual Visit was conducted with patient's (and/or legal guardian's) consent, to reduce the patient's risk of exposure to COVID-19 and provide necessary medical care. The patient (and/or legal guardian) has also been advised to contact this office for worsening conditions or problems, and seek emergency medical treatment and/or call 911 if deemed necessary. Patient was contacted and agreed to proceed with a virtual visit via Telephone Visit  The risks and benefits of converting to a virtual visit were discussed in light of the current infectious disease epidemic. Patient also understood that insurance coverage and co-pays are up to their individual insurance plans. Patient was located at their home. Provider was located at their office. 2021  Zee Somers (:  2000) has requested an audio/video evaluation for the following concern(s):    HPI  19year old male who presents for covid-19 testing after being exposed to someone who tested positive      Review of Systems   Constitutional: Negative for activity change, appetite change, chills, fever and unexpected weight change. HENT: Negative for drooling, ear pain, nosebleeds, sinus pressure and trouble swallowing.     Respiratory: Negative for cough, chest tightness and shortness of breath. Cardiovascular: Negative for chest pain and leg swelling. Gastrointestinal: Negative for abdominal pain, diarrhea and vomiting. Endocrine: Negative for polydipsia and polyphagia. Genitourinary: Negative for dysuria, flank pain and frequency. Musculoskeletal: Negative for back pain and myalgias. Skin: Negative for pallor and rash. Neurological: Negative for syncope, weakness and headaches. Hematological: Does not bruise/bleed easily. Psychiatric/Behavioral: Negative for agitation, behavioral problems and confusion. All other systems reviewed and are negative. Prior to Visit Medications    Medication Sig Taking? Authorizing Provider   benzonatate (TESSALON PERLES) 100 MG capsule Take 1 capsule by mouth 3 times daily as needed for Cough  Trinidad Garcia, APRN - CNP   famotidine (PEPCID) 20 MG tablet Take 1 tablet by mouth daily for 15 days  Abraham Beckford, APRN - CNP   ibuprofen (IBU) 800 MG tablet Take 1 tablet by mouth every 8 hours as needed for Pain  Abraham Shakeel, APRN - CNP       Past Medical History:   Diagnosis Date    Acne     Eczema      No past surgical history on file.   Social History     Socioeconomic History    Marital status: Single     Spouse name: Not on file    Number of children: Not on file    Years of education: Not on file    Highest education level: Not on file   Occupational History    Not on file   Tobacco Use    Smoking status: Never Smoker    Smokeless tobacco: Never Used   Substance and Sexual Activity    Alcohol use: Never    Drug use: No    Sexual activity: Not on file   Other Topics Concern    Not on file   Social History Narrative    Not on file     Social Determinants of Health     Financial Resource Strain:     Difficulty of Paying Living Expenses: Not on file   Food Insecurity:     Worried About Running Out of Food in the Last Year: Not on file    Ana of Food in the Last Year: Not on file Transportation Needs:     Lack of Transportation (Medical): Not on file    Lack of Transportation (Non-Medical): Not on file   Physical Activity:     Days of Exercise per Week: Not on file    Minutes of Exercise per Session: Not on file   Stress:     Feeling of Stress : Not on file   Social Connections:     Frequency of Communication with Friends and Family: Not on file    Frequency of Social Gatherings with Friends and Family: Not on file    Attends Orthodox Services: Not on file    Active Member of [address] [place] [address] [employer] or Organizations: Not on file    Attends Club or Organization Meetings: Not on file    Marital Status: Not on file   Intimate Partner Violence:     Fear of Current or Ex-Partner: Not on file    Emotionally Abused: Not on file    Physically Abused: Not on file    Sexually Abused: Not on file   Housing Stability:     Unable to Pay for Housing in the Last Year: Not on file    Number of [place] in the Last Year: Not on file    Unstable Housing in the Last Year: Not on file     No family history on file. Allergies   Allergen Reactions    Augmentin [Amoxicillin-Pot Clavulanate]     Red Dye     Tylenol [Acetaminophen]        PMH, Surgical Hx, Family Hx, and Social Hx reviewed and updated. Health Maintenance reviewed. PHYSICAL EXAMINATION:  \"[x]\" Indicates a positive item  \"[]\" Indicates a negative item    Vital Signs: (As obtained by patient/caregiver or practitioner observation)    Blood pressure-  Heart rate-    Respiratory rate-    Temperature-  Pulse oximetry-     Constitutional: [x] Appears well-developed and well-nourished [x] No apparent distress      [] Abnormal-   Mental status  [x] Alert and awake  [x] Oriented to person/place/time [x]Able to follow commands      Eyes:  EOM    []  Normal  [] Abnormal-  Sclera  [x]  Normal  [] Abnormal -         Discharge [x]  None visible  [] Abnormal -    HENT:   [x] Normocephalic, atraumatic.   [] Abnormal   [x] Mouth/Throat: Mucous membranes are moist.     External Ears [x] Normal  [] Abnormal-     Neck: [x] No visualized mass     Pulmonary/Chest: [x] Respiratory effort normal.  [x] No visualized signs of difficulty breathing or respiratory distress        [] Abnormal-      Musculoskeletal:   [x] Normal gait with no signs of ataxia         [x] Normal range of motion of neck        [] Abnormal-       Neurological:       [x] No Facial Asymmetry (Cranial nerve 7 motor function) (limited exam to video visit)          [x] No gaze palsy        [] Abnormal-         Skin:        [x] No significant exanthematous lesions or discoloration noted on facial skin         [] Abnormal-            Psychiatric:       [x] Normal Affect [x] No Hallucinations        [] Abnormal-     Other pertinent observable physical exam findings-   Results for orders placed or performed during the hospital encounter of 11/07/20   Rapid Influenza A/B Antigens    Specimen: Nasopharyngeal   Result Value Ref Range    Influenza A by PCR Negative     Influenza B by PCR Negative    COVID-19   Result Value Ref Range    SARS-CoV-2, NAAT DETECTED (A) Not Detected       ASSESSMENT/PLAN:  Assessment & Plan   Diagnoses and all orders for this visit:    COVID-19 virus infection      No orders of the defined types were placed in this encounter. No orders of the defined types were placed in this encounter. There are no discontinued medications. Return if symptoms worsen or fail to improve. Reviewed with the patient: current clinical status, medications, activities and diet. Side effects, adverse effects of the medication prescribed today, as well as treatment plan/ rationale and result expectations have been discussed with the patient who expresses understanding and desires to proceed. Close follow up to evaluate treatment results and for coordination of care. I have reviewed the patient's medical history in detail and updated the computerized patient record.      Patient identification was verified at the start of the visit: Yes    Total time spent on this encounter: Not billed by time      --VALENCIA Ramos on 12/27/2021 at 11:45 AM    An electronic signature was used to authenticate this note.

## 2021-12-27 NOTE — PATIENT INSTRUCTIONS
Patient Education        Learning About Being High-Risk for Serious Illness From COVID-19  Who is at high risk? COVID-19 causes a mild illness in many people who have it. But certain things may increase your risk for more serious illness. These include:  · Age. ? Babies born premature or who are less than 3year old may be at high risk. ? The risk also increases with age. Older adults are at highest risk. · Asthma, cystic fibrosis, chronic obstructive pulmonary disease (COPD), and other chronic lung diseases. · Vaping or smoking or having a history of smoking. · Serious heart conditions, such as heart failure, coronary artery disease, or high blood pressure. · HIV. · A weakened immune system or taking medicines, such as steroids, that suppress the immune system. · Cancer or getting treatment for cancer. · Neurologic conditions or diseases that involve the nerves and brain, such as stroke, dementia, or cerebral palsy. · Being overweight (obesity). · Diabetes. · Chronic kidney disease. · Liver disease. · Substance use disorders. · Sickle cell disease. · Pregnancy or a recent pregnancy. · Genetic, metabolic, or neurologic problems in children. This includes children who may have many health problems that affect many body systems. These problems may limit how well the child can do routine activities of daily life. · Down syndrome. This is not a complete list. If you have a chronic health problem, ask your doctor if you should take extra precautions. Should you get the COVID-19 vaccine if you have an underlying health problem? The simple answer is yes. The COVID-19 vaccine is safe and effective for almost everyone. The only people advised not to get it are those who have had a severe allergic reaction to the vaccine's ingredients. Experts recommend getting the vaccine. This is especially true if you have an underlying health problem like diabetes, chronic lung disease, or obesity.  Getting infected with COVID-19 can be much worse if you have conditions like these. If you have a weakened immune system, you may be at higher risk for getting seriously ill with COVID-19. The vaccine may not work as well for you, but it should still be safe. Talk with your doctor if you have any questions about the vaccine. How can you protect yourself and others? · Get vaccinated. · Avoid sick people. · Cover your mouth with a tissue when you cough or sneeze. · Wash your hands often, especially after you cough or sneeze. Use soap and water, and scrub for at least 20 seconds. If soap and water aren't available, use an alcohol-based hand . · Avoid touching your mouth, nose, and eyes. Be sure to follow all instructions from the [facility] and your local health authorities. Here are some examples of specific precautions you may need to take. · If you are not fully vaccinated:  ? Wear a mask if you have to go to public areas. ? Avoid crowds and try to stay at least 6 feet away from other people. · If you have been exposed to the virus and are not fully vaccinated:  ? Stay home. Don't go to school, work, or public areas. And don't use public transportation, ride-shares, or taxis unless you have no choice. ? Wear a mask if you have to go to public areas, like the pharmacy. · Even if you're fully vaccinated, there's still a small chance you can get and spread COVID-19. If you live in an area where COVID-19 is spreading quickly, wear a mask if you have to go to indoor public areas. You might also want to wear a mask in crowded outdoor areas if you:  ? Have certain health conditions. ? Live with someone who has a compromised immune system. ? Live with someone who is not fully vaccinated. · If you have been exposed and you are fully vaccinated:  ? Talk to your doctor. You may need a COVID-19 test.  ? Wear a mask in public indoor spaces for 14 days or until you test negative for COVID-19.   If you're sick:  · Leave your home only if you need to get medical care. But call the doctor's office first so they know you're coming. And wear a mask. · Wear a mask whenever you're around other people. · Limit contact with pets and people in your home. If possible, stay in a separate bedroom and use a separate bathroom. · Clean and disinfect your home every day. Use household  and disinfectant wipes or sprays. Take special care to clean things that you touch with your hands. Should you get the COVID-19 vaccine if you are pregnant or were recently pregnant? Experts recommend getting the COVID-19 vaccine if you are pregnant or were recently pregnant. Talk to your doctor about getting the vaccine. Other vaccines, like the flu vaccine, are safely given in pregnancy and after pregnancy. The risk of problems from the COVID-19 vaccine should be far smaller than the risks to you and your baby from having the infection. Where can you learn more? Go to https://[facility].[employer]. org and sign in to your [employer] account. Enter A131 in the [employer] box to learn more about \"Learning About Being High-Risk for Serious Illness From COVID-19. \"     If you do not have an account, please click on the \"Sign Up Now\" link. Current as of: July 1, 2021               Content Version: 13.1  © 2006-2021 Healthwise, Incorporated. Care instructions adapted under license by [facility] ([facility]). If you have questions about a medical condition or this instruction, always ask your healthcare professional. [place] [address] any warranty or liability for your use of this information.

## 2022-10-15 ENCOUNTER — HOSPITAL ENCOUNTER (EMERGENCY)
Age: 22
Discharge: HOME | End: 2022-10-15
Attending: EMERGENCY MEDICINE
Payer: COMMERCIAL

## 2022-10-15 VITALS
WEIGHT: 163 LBS | BODY MASS INDEX: 25.53 KG/M2 | SYSTOLIC BLOOD PRESSURE: 134 MMHG | RESPIRATION RATE: 18 BRPM | OXYGEN SATURATION: 97 % | HEART RATE: 77 BPM | TEMPERATURE: 98.2 F | DIASTOLIC BLOOD PRESSURE: 85 MMHG

## 2022-10-15 DIAGNOSIS — M54.50 LUMBAR BACK PAIN: Primary | ICD-10-CM

## 2022-10-15 PROCEDURE — 96372 THER/PROPH/DIAG INJ SC/IM: CPT

## 2022-10-15 PROCEDURE — 99284 EMERGENCY DEPT VISIT MOD MDM: CPT

## 2022-10-15 PROCEDURE — 6360000002 HC RX W HCPCS: Performed by: EMERGENCY MEDICINE

## 2022-10-15 PROCEDURE — 6370000000 HC RX 637 (ALT 250 FOR IP): Performed by: EMERGENCY MEDICINE

## 2022-10-15 RX ORDER — PREDNISONE 20 MG/1
40 TABLET ORAL ONCE
Status: COMPLETED | OUTPATIENT
Start: 2022-10-15 | End: 2022-10-15

## 2022-10-15 RX ORDER — KETOROLAC TROMETHAMINE 10 MG/1
10 TABLET, FILM COATED ORAL EVERY 6 HOURS PRN
Qty: 20 TABLET | Refills: 0 | Status: SHIPPED | OUTPATIENT
Start: 2022-10-15

## 2022-10-15 RX ORDER — CYCLOBENZAPRINE HCL 10 MG
10 TABLET ORAL 3 TIMES DAILY PRN
Qty: 21 TABLET | Refills: 0 | Status: SHIPPED | OUTPATIENT
Start: 2022-10-15 | End: 2022-10-25

## 2022-10-15 RX ORDER — CYCLOBENZAPRINE HCL 10 MG
10 TABLET ORAL ONCE
Status: COMPLETED | OUTPATIENT
Start: 2022-10-15 | End: 2022-10-15

## 2022-10-15 RX ORDER — PREDNISONE 10 MG/1
TABLET ORAL
Qty: 30 TABLET | Refills: 0 | Status: SHIPPED | OUTPATIENT
Start: 2022-10-15 | End: 2022-10-25

## 2022-10-15 RX ORDER — KETOROLAC TROMETHAMINE 30 MG/ML
60 INJECTION, SOLUTION INTRAMUSCULAR; INTRAVENOUS ONCE
Status: COMPLETED | OUTPATIENT
Start: 2022-10-15 | End: 2022-10-15

## 2022-10-15 RX ADMIN — CYCLOBENZAPRINE 10 MG: 10 TABLET, FILM COATED ORAL at 01:34

## 2022-10-15 RX ADMIN — PREDNISONE 40 MG: 20 TABLET ORAL at 01:35

## 2022-10-15 RX ADMIN — KETOROLAC TROMETHAMINE 60 MG: 30 INJECTION, SOLUTION INTRAMUSCULAR at 01:36

## 2022-10-15 ASSESSMENT — ENCOUNTER SYMPTOMS
VOMITING: 0
EYE DISCHARGE: 0
WHEEZING: 0
SHORTNESS OF BREATH: 0
BACK PAIN: 1
ABDOMINAL DISTENTION: 0
CHEST TIGHTNESS: 0
PHOTOPHOBIA: 0
SORE THROAT: 0
ABDOMINAL PAIN: 0
COUGH: 0

## 2022-10-15 ASSESSMENT — PAIN DESCRIPTION - LOCATION: LOCATION: BACK

## 2022-10-15 ASSESSMENT — PAIN - FUNCTIONAL ASSESSMENT
PAIN_FUNCTIONAL_ASSESSMENT: NONE - DENIES PAIN
PAIN_FUNCTIONAL_ASSESSMENT: 0-10

## 2022-10-15 ASSESSMENT — PAIN SCALES - GENERAL: PAINLEVEL_OUTOF10: 8

## 2022-10-15 NOTE — Clinical Note
Fontaine Kehr was seen and treated in our emergency department on 10/15/2022. He may return to work on 10/16/2022. Off Work Friday and Saturday due to injury     If you have any questions or concerns, please don't hesitate to call.       Chely Walls MD

## 2022-10-15 NOTE — ED PROVIDER NOTES
[facility] ED  eMERGENCY dEPARTMENT eNCOUnter      Pt Name: Justus Mullins  MRN: [id number]  [place] 2000  Date of evaluation: 10/15/2022  Provider: Yessi Ohara MD    CHIEF COMPLAINT       Chief Complaint   Patient presents with    Lower Back Pain     Xmonths; spreading down left leg. HISTORY OF PRESENT ILLNESS   (Location/Symptom, Timing/Onset,Context/Setting, Quality, Duration, Modifying Factors, Severity)  Note limiting factors. Justus Mullins is a 23 y.o. male who presents to the emergency department of lower back pain. Patient states he had lower back pain for months. He had a leave work tonight because of lower back pain. Now it is in the lumbar region and radiates to his left buttocks and upper leg. Worse with movement. Pain is moderate. No other complaints. Events. No numbness in the groin area. HPI    NursingNotes were reviewed. REVIEW OF SYSTEMS    (2-9 systems for level 4, 10 or more for level 5)     Review of Systems   Constitutional:  Negative for chills and diaphoresis. HENT:  Negative for congestion, ear pain, mouth sores and sore throat. Eyes:  Negative for photophobia and discharge. Respiratory:  Negative for cough, chest tightness, shortness of breath and wheezing. Cardiovascular:  Negative for chest pain and palpitations. Gastrointestinal:  Negative for abdominal distention, abdominal pain and vomiting. Endocrine: Negative for cold intolerance. Genitourinary:  Negative for difficulty urinating. Musculoskeletal:  Positive for back pain. Negative for arthralgias and gait problem. Skin:  Negative for pallor and rash. Allergic/Immunologic: Negative for immunocompromised state. Neurological:  Negative for dizziness and syncope. Hematological:  Negative for adenopathy. Psychiatric/Behavioral:  Negative for agitation and hallucinations. All other systems reviewed and are negative.     Except as noted above the remainder of the review of systems was reviewed and negative. PAST MEDICAL HISTORY     Past Medical History:   Diagnosis Date    Acne     Eczema          SURGICALHISTORY     No past surgical history on file. CURRENT MEDICATIONS       Previous Medications    BENZONATATE (TESSALON PERLES) 100 MG CAPSULE    Take 1 capsule by mouth 3 times daily as needed for Cough    FAMOTIDINE (PEPCID) 20 MG TABLET    Take 1 tablet by mouth daily for 15 days       ALLERGIES     Augmentin [amoxicillin-pot clavulanate], Red dye, and Tylenol [acetaminophen]    FAMILY HISTORY     No family history on file. SOCIAL HISTORY       Social History     Socioeconomic History    Marital status: Single   Tobacco Use    Smoking status: Never    Smokeless tobacco: Never   Substance and Sexual Activity    Alcohol use: Never    Drug use: No       SCREENINGS    [place] Coma Scale  Eye Opening: Spontaneous  Best Verbal Response: Oriented  Best Motor Response: Obeys commands  [place] Coma Scale Score: 15 @FLOW(13370384)@      PHYSICAL EXAM    (up to 7 for level 4, 8 or more for level 5)     ED Triage Vitals [10/15/22 0108]   BP Temp Temp Source Heart Rate Resp SpO2 Height Weight   134/85 98.2 °F (36.8 °C) Temporal 77 18 97 % -- 163 lb (73.9 kg)       Physical Exam  Vitals and nursing note reviewed. Constitutional:       Appearance: He is well-developed. HENT:      Head: Normocephalic. Right Ear: Tympanic membrane normal.      Left Ear: Tympanic membrane normal.      Nose: Nose normal.   Eyes:      Conjunctiva/sclera: Conjunctivae normal.      Pupils: Pupils are equal, round, and reactive to light. Cardiovascular:      Rate and Rhythm: Normal rate and regular rhythm. Heart sounds: Normal heart sounds. Pulmonary:      Effort: Pulmonary effort is normal.      Breath sounds: Normal breath sounds. Abdominal:      General: Bowel sounds are normal.      Palpations: Abdomen is soft. Tenderness: There is no abdominal tenderness.  There is no guarding. Musculoskeletal:         General: Normal range of motion. Cervical back: Normal range of motion and neck supple. Skin:     General: Skin is warm and dry. Capillary Refill: Capillary refill takes less than 2 seconds. Neurological:      General: No focal deficit present. Mental Status: He is alert and oriented to person, place, and time. Psychiatric:         Mood and Affect: Mood normal.       DIAGNOSTIC RESULTS     EKG: All EKG's are interpreted by the Emergency Department Physician who either signs or Co-signsthis chart in the absence of a cardiologist.      RADIOLOGY:   Brandee Salmons such as CT, Ultrasound and MRI are read by the radiologist. Plain radiographic images are visualized and preliminarily interpreted by the emergency physician with the below findings:      Interpretation per the Radiologist below, if available at the time ofthis note:    No orders to display         ED BEDSIDE ULTRASOUND:   Performed by ED Physician - none    LABS:  Labs Reviewed - No data to display    All other labs were within normal range or not returned as of this dictation. EMERGENCY DEPARTMENT COURSE and DIFFERENTIAL DIAGNOSIS/MDM:   Vitals:    Vitals:    10/15/22 0108   BP: 134/85   Pulse: 77   Resp: 18   Temp: 98.2 °F (36.8 °C)   TempSrc: Temporal   SpO2: 97%   Weight: 163 lb (73.9 kg)        MDM    Patient with musculoskeletal lower back pain and some mild sciatica. Treated with steroids and muscle relaxers along with limited pain medication. CONSULTS:  None    PROCEDURES:  Unless otherwise noted below, none     Procedures    FINAL IMPRESSION      1.  Lumbar back pain          DISPOSITION/PLAN   DISPOSITION Decision To Discharge 10/15/2022 01:31:38 AM      PATIENT REFERRED TO:  Aguilar Rawls MD  [address] [place] [id number] [phone]    In 1 week      DISCHARGE MEDICATIONS:  New Prescriptions    CYCLOBENZAPRINE (FLEXERIL) 10 MG TABLET    Take 1 tablet by mouth 3 times daily as needed for Muscle spasms    KETOROLAC (TORADOL) 10 MG TABLET    Take 1 tablet by mouth every 6 hours as needed for Pain    PREDNISONE (DELTASONE) 10 MG TABLET    5 tabs po qam for 2 days then 4,3,2,1 tabs qam for 2 days each total of 10 days          (Please note that portions of this note were completed with a voice recognition program.  Efforts were made to edit the dictations but occasionally words are mis-transcribed.)    Albert Queen MD (electronically signed)  Attending Emergency Physician         Albert Queen MD  10/15/22 0134       Albert Queen MD  10/15/22 6335

## 2022-10-22 ENCOUNTER — APPOINTMENT (OUTPATIENT)
Dept: CT IMAGING | Age: 22
End: 2022-10-22
Payer: COMMERCIAL

## 2022-10-22 ENCOUNTER — HOSPITAL ENCOUNTER (EMERGENCY)
Age: 22
Discharge: HOME | End: 2022-10-22
Attending: EMERGENCY MEDICINE
Payer: COMMERCIAL

## 2022-10-22 VITALS
RESPIRATION RATE: 16 BRPM | SYSTOLIC BLOOD PRESSURE: 129 MMHG | OXYGEN SATURATION: 98 % | DIASTOLIC BLOOD PRESSURE: 77 MMHG | BODY MASS INDEX: 25.11 KG/M2 | HEART RATE: 65 BPM | WEIGHT: 160 LBS | HEIGHT: 67 IN | TEMPERATURE: 97.9 F

## 2022-10-22 DIAGNOSIS — M54.50 ACUTE BILATERAL LOW BACK PAIN WITHOUT SCIATICA: Primary | ICD-10-CM

## 2022-10-22 LAB
ALBUMIN SERPL-MCNC: 4.3 G/DL (ref 3.5–4.6)
ALP BLD-CCNC: 77 U/L (ref 35–104)
ALT SERPL-CCNC: 15 U/L (ref 0–41)
ANION GAP SERPL CALCULATED.3IONS-SCNC: 12 MEQ/L (ref 9–15)
AST SERPL-CCNC: 14 U/L (ref 0–40)
BASOPHILS ABSOLUTE: 0 K/UL (ref 0–0.2)
BASOPHILS RELATIVE PERCENT: 0.4 %
BILIRUB SERPL-MCNC: <0.2 MG/DL (ref 0.2–0.7)
BILIRUBIN URINE: NEGATIVE
BLOOD, URINE: NEGATIVE
BUN BLDV-MCNC: 23 MG/DL (ref 6–20)
CALCIUM SERPL-MCNC: 9.1 MG/DL (ref 8.5–9.9)
CHLORIDE BLD-SCNC: 105 MEQ/L (ref 95–107)
CLARITY: CLEAR
CO2: 25 MEQ/L (ref 20–31)
COLOR: YELLOW
CREAT SERPL-MCNC: 1.01 MG/DL (ref 0.7–1.2)
EOSINOPHILS ABSOLUTE: 0.1 K/UL (ref 0–0.7)
EOSINOPHILS RELATIVE PERCENT: 0.4 %
GFR SERPL CREATININE-BSD FRML MDRD: >60 ML/MIN/{1.73_M2}
GLOBULIN: 2.4 G/DL (ref 2.3–3.5)
GLUCOSE BLD-MCNC: 109 MG/DL (ref 70–99)
GLUCOSE URINE: NEGATIVE MG/DL
HCT VFR BLD CALC: 39.7 % (ref 42–52)
HEMOGLOBIN: 13.7 G/DL (ref 14–18)
KETONES, URINE: NEGATIVE MG/DL
LEUKOCYTE ESTERASE, URINE: NEGATIVE
LYMPHOCYTES ABSOLUTE: 3.1 K/UL (ref 1–4.8)
LYMPHOCYTES RELATIVE PERCENT: 23.8 %
MCH RBC QN AUTO: 26.9 PG (ref 27–31.3)
MCHC RBC AUTO-ENTMCNC: 34.5 % (ref 33–37)
MCV RBC AUTO: 78 FL (ref 79–92.2)
MONOCYTES ABSOLUTE: 0.9 K/UL (ref 0.2–0.8)
MONOCYTES RELATIVE PERCENT: 6.9 %
NEUTROPHILS ABSOLUTE: 9 K/UL (ref 1.4–6.5)
NEUTROPHILS RELATIVE PERCENT: 68.5 %
NITRITE, URINE: NEGATIVE
PDW BLD-RTO: 14.4 % (ref 11.5–14.5)
PH UA: 6.5 (ref 5–9)
PLATELET # BLD: 191 K/UL (ref 130–400)
POTASSIUM SERPL-SCNC: 3.8 MEQ/L (ref 3.4–4.9)
PROTEIN UA: NEGATIVE MG/DL
RBC # BLD: 5.09 M/UL (ref 4.7–6.1)
SEDIMENTATION RATE, ERYTHROCYTE: 1 MM (ref 0–10)
SODIUM BLD-SCNC: 142 MEQ/L (ref 135–144)
SPECIFIC GRAVITY UA: 1.03 (ref 1–1.03)
TOTAL PROTEIN: 6.7 G/DL (ref 6.3–8)
URINE REFLEX TO CULTURE: NORMAL
UROBILINOGEN, URINE: 1 E.U./DL
WBC # BLD: 13.2 K/UL (ref 4.8–10.8)

## 2022-10-22 PROCEDURE — 99284 EMERGENCY DEPT VISIT MOD MDM: CPT

## 2022-10-22 PROCEDURE — 96374 THER/PROPH/DIAG INJ IV PUSH: CPT

## 2022-10-22 PROCEDURE — 80053 COMPREHEN METABOLIC PANEL: CPT

## 2022-10-22 PROCEDURE — 85025 COMPLETE CBC W/AUTO DIFF WBC: CPT

## 2022-10-22 PROCEDURE — 72131 CT LUMBAR SPINE W/O DYE: CPT

## 2022-10-22 PROCEDURE — 6360000002 HC RX W HCPCS: Performed by: PERSONAL EMERGENCY RESPONSE ATTENDANT

## 2022-10-22 PROCEDURE — 85652 RBC SED RATE AUTOMATED: CPT

## 2022-10-22 PROCEDURE — 96375 TX/PRO/DX INJ NEW DRUG ADDON: CPT

## 2022-10-22 PROCEDURE — 81003 URINALYSIS AUTO W/O SCOPE: CPT

## 2022-10-22 RX ORDER — MORPHINE SULFATE 4 MG/ML
4 INJECTION, SOLUTION INTRAMUSCULAR; INTRAVENOUS ONCE
Status: COMPLETED | OUTPATIENT
Start: 2022-10-22 | End: 2022-10-22

## 2022-10-22 RX ORDER — ONDANSETRON 2 MG/ML
4 INJECTION INTRAMUSCULAR; INTRAVENOUS ONCE
Status: COMPLETED | OUTPATIENT
Start: 2022-10-22 | End: 2022-10-22

## 2022-10-22 RX ORDER — KETOROLAC TROMETHAMINE 30 MG/ML
30 INJECTION, SOLUTION INTRAMUSCULAR; INTRAVENOUS ONCE
Status: COMPLETED | OUTPATIENT
Start: 2022-10-22 | End: 2022-10-22

## 2022-10-22 RX ORDER — HYDROCODONE BITARTRATE AND ACETAMINOPHEN 5; 325 MG/1; MG/1
1 TABLET ORAL EVERY 6 HOURS PRN
Qty: 10 TABLET | Refills: 0 | Status: SHIPPED | OUTPATIENT
Start: 2022-10-22 | End: 2022-10-25

## 2022-10-22 RX ADMIN — ONDANSETRON 4 MG: 2 INJECTION INTRAMUSCULAR; INTRAVENOUS at 21:38

## 2022-10-22 RX ADMIN — MORPHINE SULFATE 4 MG: 4 INJECTION, SOLUTION INTRAMUSCULAR; INTRAVENOUS at 21:39

## 2022-10-22 RX ADMIN — KETOROLAC TROMETHAMINE 30 MG: 30 INJECTION, SOLUTION INTRAMUSCULAR at 21:38

## 2022-10-22 ASSESSMENT — ENCOUNTER SYMPTOMS
BLOOD IN STOOL: 0
COUGH: 0
DIARRHEA: 0
SHORTNESS OF BREATH: 0
NAUSEA: 0
ABDOMINAL PAIN: 0
VOMITING: 0
RHINORRHEA: 0
BACK PAIN: 1
COLOR CHANGE: 0
SORE THROAT: 0

## 2022-10-22 ASSESSMENT — PAIN - FUNCTIONAL ASSESSMENT
PAIN_FUNCTIONAL_ASSESSMENT: PREVENTS OR INTERFERES WITH MANY ACTIVE NOT PASSIVE ACTIVITIES
PAIN_FUNCTIONAL_ASSESSMENT: 0-10

## 2022-10-22 ASSESSMENT — PAIN SCALES - GENERAL
PAINLEVEL_OUTOF10: 0
PAINLEVEL_OUTOF10: 9
PAINLEVEL_OUTOF10: 9

## 2022-10-22 ASSESSMENT — LIFESTYLE VARIABLES
HOW OFTEN DO YOU HAVE A DRINK CONTAINING ALCOHOL: NEVER
HOW MANY STANDARD DRINKS CONTAINING ALCOHOL DO YOU HAVE ON A TYPICAL DAY: PATIENT DOES NOT DRINK

## 2022-10-22 ASSESSMENT — PAIN DESCRIPTION - DESCRIPTORS
DESCRIPTORS: ACHING
DESCRIPTORS: ACHING

## 2022-10-22 ASSESSMENT — PAIN DESCRIPTION - FREQUENCY: FREQUENCY: CONTINUOUS

## 2022-10-22 ASSESSMENT — PAIN DESCRIPTION - LOCATION
LOCATION: BACK
LOCATION: BACK

## 2022-10-22 ASSESSMENT — PAIN DESCRIPTION - ONSET: ONSET: ON-GOING

## 2022-10-22 ASSESSMENT — PAIN DESCRIPTION - PAIN TYPE: TYPE: CHRONIC PAIN

## 2022-10-22 ASSESSMENT — PAIN DESCRIPTION - ORIENTATION: ORIENTATION: LOWER

## 2022-10-23 NOTE — ED PROVIDER NOTES
[address] [facility] ED  eMERGENCY dEPARTMENT eNCOUnter      Pt Name: Dulce Vivas  MRN: [id number]  [place] 2000  Date of evaluation: 10/22/2022  Provider: VALENCIA Carranza      HISTORY OF PRESENT ILLNESS    Dulce Vivas is a 23 y.o. male with PMHx of eczema presents to the emergency department with back pain. Pt says for 1 month he has been having gradual onset midline low back pain that his worsening. He was seen here 10/15 and given toradol, prednisone and flexeril with temporary improvement but now pain is worsening. Pain does radiate to lateral thigh with occasional paresthesias. He denies any other previous history of back pain. Nothing occurred 1 month ago which seem to bring on his back pain. He denies fevers, cough, chest pain, shortness of breath, abdominal pain, nausea, vomiting, diarrhea, constipation, urinary issues, saddle paresthesia, urinary or bowel dysfunction, leg weakness. He denies any history of cancer or IV drug use. HPI    Nursing Notes were reviewed. REVIEW OF SYSTEMS       Review of Systems   Constitutional:  Negative for appetite change, chills and fever. HENT:  Negative for congestion, rhinorrhea and sore throat. Respiratory:  Negative for cough and shortness of breath. Cardiovascular:  Negative for chest pain. Gastrointestinal:  Negative for abdominal pain, blood in stool, diarrhea, nausea and vomiting. Genitourinary:  Negative for difficulty urinating. Musculoskeletal:  Positive for back pain. Negative for neck stiffness. Skin:  Negative for color change and rash. Neurological:  Negative for dizziness, syncope, weakness, light-headedness, numbness and headaches. All other systems reviewed and are negative. PAST MEDICAL HISTORY     Past Medical History:   Diagnosis Date    Acne     Eczema          SURGICAL HISTORY     No past surgical history on file.       CURRENT MEDICATIONS       Previous Medications    BENZONATATE (TESSALON PERLES) 100 MG CAPSULE    Take 1 capsule by mouth 3 times daily as needed for Cough    CYCLOBENZAPRINE (FLEXERIL) 10 MG TABLET    Take 1 tablet by mouth 3 times daily as needed for Muscle spasms    FAMOTIDINE (PEPCID) 20 MG TABLET    Take 1 tablet by mouth daily for 15 days    KETOROLAC (TORADOL) 10 MG TABLET    Take 1 tablet by mouth every 6 hours as needed for Pain    PREDNISONE (DELTASONE) 10 MG TABLET    5 tabs po qam for 2 days then 4,3,2,1 tabs qam for 2 days each total of 10 days       ALLERGIES     Augmentin [amoxicillin-pot clavulanate], Red dye, and Tylenol [acetaminophen]    FAMILY HISTORY     No family history on file. SOCIAL HISTORY       Social History     Socioeconomic History    Marital status: Single   Tobacco Use    Smoking status: Never    Smokeless tobacco: Never   Substance and Sexual Activity    Alcohol use: Never    Drug use: No         PHYSICAL EXAM         ED Triage Vitals [10/22/22 2103]   BP Temp Temp Source Heart Rate Resp SpO2 Height Weight   (!) 144/80 97.9 °F (36.6 °C) Oral 67 16 99 % 5' 7\" (1.702 m) 160 lb (72.6 kg)       Physical Exam  Constitutional:       Appearance: He is well-developed. HENT:      Head: Normocephalic and atraumatic. Eyes:      Conjunctiva/sclera: Conjunctivae normal.      Pupils: Pupils are equal, round, and reactive to light. Neck:      Trachea: No tracheal deviation. Cardiovascular:      Heart sounds: Normal heart sounds. Pulmonary:      Effort: Pulmonary effort is normal. No respiratory distress. Breath sounds: Normal breath sounds. No stridor. Abdominal:      General: Bowel sounds are normal. There is no distension. Palpations: Abdomen is soft. There is no mass. Tenderness: There is no abdominal tenderness. There is no guarding or rebound. Musculoskeletal:         General: Signs of injury present. Normal range of motion. Cervical back: Normal range of motion and neck supple.       Comments: Mild tenderness palpation in lumbar midline and minimally bilateral paraspinal muscles, no step-offs, no signs of trauma. Straight leg and cross straight leg test reveal exacerbation of low back pain with leg at 90 degree angle. MSP intact distally. Skin:     General: Skin is warm and dry. Capillary Refill: Capillary refill takes less than 2 seconds. Findings: No rash. Neurological:      Mental Status: He is alert and oriented to person, place, and time. Deep Tendon Reflexes: Reflexes are normal and symmetric. Psychiatric:         Behavior: Behavior normal.         Thought Content: Thought content normal.         Judgment: Judgment normal.       DIAGNOSTIC RESULTS     EKG:All EKG's are interpreted by the Emergency Department Physician who either signs or Co-signs this chart in the absence of a cardiologist.        RADIOLOGY:   Non-plain film images such as CT, Ultrasound and MRI are read by theradiologist. Plain radiographic images are visualized and preliminarily interpreted by the emergency physician with the below findings:    Interpretation per theRadiologist below, if available at the time of this note:    CT LUMBAR SPINE WO CONTRAST    (Results Pending)           LABS:  Labs Reviewed   COMPREHENSIVE METABOLIC PANEL - Abnormal; Notable for the following components:       Result Value    Glucose 109 (*)     BUN 23 (*)     All other components within normal limits   CBC WITH AUTO DIFFERENTIAL - Abnormal; Notable for the following components:    WBC 13.2 (*)     Hemoglobin 13.7 (*)     Hematocrit 39.7 (*)     MCV 78.0 (*)     MCH 26.9 (*)     Neutrophils Absolute 9.0 (*)     Monocytes Absolute 0.9 (*)     All other components within normal limits   URINALYSIS WITH REFLEX TO CULTURE   SEDIMENTATION RATE       All other labs were within normal range or not returned as of this dictation.     EMERGENCY DEPARTMENT COURSE and DIFFERENTIAL DIAGNOSIS/MDM:   Vitals:    Vitals:    10/22/22 2103 10/22/22 2232   BP: (!) 144/80 128/85 edit the dictations but occasionally words are mis-transcribed. )    VALENCIA Madsen (electronically signed)  Emergency Physician Luke Blandon [address]  10/22/22 0560

## 2022-10-23 NOTE — ED TRIAGE NOTES
Pt arrived with c/o lower back pain and shooting down his left leg. Pt was here a week ago for the same, he states the pain has gotten too severe.

## 2022-10-25 ENCOUNTER — HOSPITAL ENCOUNTER (EMERGENCY)
Age: 22
Discharge: HOME | End: 2022-10-25
Attending: STUDENT IN AN ORGANIZED HEALTH CARE EDUCATION/TRAINING PROGRAM
Payer: COMMERCIAL

## 2022-10-25 VITALS
HEART RATE: 103 BPM | SYSTOLIC BLOOD PRESSURE: 144 MMHG | HEIGHT: 67 IN | RESPIRATION RATE: 16 BRPM | OXYGEN SATURATION: 98 % | TEMPERATURE: 97.8 F | DIASTOLIC BLOOD PRESSURE: 96 MMHG | BODY MASS INDEX: 25.11 KG/M2 | WEIGHT: 160 LBS

## 2022-10-25 DIAGNOSIS — M54.31 BILATERAL SCIATICA: ICD-10-CM

## 2022-10-25 DIAGNOSIS — M54.32 BILATERAL SCIATICA: ICD-10-CM

## 2022-10-25 DIAGNOSIS — S39.012A LUMBOSACRAL STRAIN, INITIAL ENCOUNTER: Primary | ICD-10-CM

## 2022-10-25 PROCEDURE — 6370000000 HC RX 637 (ALT 250 FOR IP): Performed by: STUDENT IN AN ORGANIZED HEALTH CARE EDUCATION/TRAINING PROGRAM

## 2022-10-25 PROCEDURE — 99284 EMERGENCY DEPT VISIT MOD MDM: CPT

## 2022-10-25 PROCEDURE — 6360000002 HC RX W HCPCS: Performed by: STUDENT IN AN ORGANIZED HEALTH CARE EDUCATION/TRAINING PROGRAM

## 2022-10-25 PROCEDURE — 96372 THER/PROPH/DIAG INJ SC/IM: CPT

## 2022-10-25 RX ORDER — ACETAMINOPHEN 500 MG
1000 TABLET ORAL EVERY 6 HOURS PRN
Qty: 60 TABLET | Refills: 0 | Status: SHIPPED | OUTPATIENT
Start: 2022-10-25

## 2022-10-25 RX ORDER — METHOCARBAMOL 500 MG/1
1500 TABLET, FILM COATED ORAL ONCE
Status: COMPLETED | OUTPATIENT
Start: 2022-10-25 | End: 2022-10-25

## 2022-10-25 RX ORDER — KETOROLAC TROMETHAMINE 30 MG/ML
30 INJECTION, SOLUTION INTRAMUSCULAR; INTRAVENOUS ONCE
Status: COMPLETED | OUTPATIENT
Start: 2022-10-25 | End: 2022-10-25

## 2022-10-25 RX ORDER — DEXAMETHASONE 6 MG/1
12 TABLET ORAL ONCE
Status: COMPLETED | OUTPATIENT
Start: 2022-10-25 | End: 2022-10-25

## 2022-10-25 RX ORDER — METHOCARBAMOL 500 MG/1
1000 TABLET, FILM COATED ORAL 4 TIMES DAILY PRN
Qty: 40 TABLET | Refills: 0 | Status: SHIPPED | OUTPATIENT
Start: 2022-10-25 | End: 2022-11-01

## 2022-10-25 RX ORDER — ACETAMINOPHEN 500 MG
1000 TABLET ORAL ONCE
Status: COMPLETED | OUTPATIENT
Start: 2022-10-25 | End: 2022-10-25

## 2022-10-25 RX ADMIN — METHOCARBAMOL TABLETS 1500 MG: 500 TABLET, COATED ORAL at 23:29

## 2022-10-25 RX ADMIN — ACETAMINOPHEN 1000 MG: 500 TABLET ORAL at 23:29

## 2022-10-25 RX ADMIN — KETOROLAC TROMETHAMINE 30 MG: 30 INJECTION, SOLUTION INTRAMUSCULAR at 23:29

## 2022-10-25 RX ADMIN — DEXAMETHASONE 12 MG: 6 TABLET ORAL at 23:29

## 2022-10-25 ASSESSMENT — PAIN - FUNCTIONAL ASSESSMENT
PAIN_FUNCTIONAL_ASSESSMENT: NONE - DENIES PAIN
PAIN_FUNCTIONAL_ASSESSMENT: 0-10

## 2022-10-25 ASSESSMENT — PAIN DESCRIPTION - LOCATION: LOCATION: BACK

## 2022-10-25 ASSESSMENT — PAIN DESCRIPTION - PAIN TYPE: TYPE: ACUTE PAIN

## 2022-10-25 ASSESSMENT — PAIN SCALES - GENERAL: PAINLEVEL_OUTOF10: 8

## 2022-10-25 ASSESSMENT — PAIN DESCRIPTION - ORIENTATION: ORIENTATION: LOWER

## 2022-10-25 ASSESSMENT — PAIN DESCRIPTION - DESCRIPTORS: DESCRIPTORS: THROBBING;TINGLING

## 2022-10-26 NOTE — DISCHARGE INSTRUCTIONS
Please follow up with your PCP as soon as possible and Orthopedics tomorrow as previously scheduled.

## 2022-10-31 ASSESSMENT — ENCOUNTER SYMPTOMS
SHORTNESS OF BREATH: 0
RHINORRHEA: 0
BACK PAIN: 1
COUGH: 0
EYE PAIN: 0
ABDOMINAL PAIN: 0
DIARRHEA: 0
NAUSEA: 0
VOMITING: 0
SORE THROAT: 0

## 2022-10-31 NOTE — ED PROVIDER NOTES
[address] [facility] ED  eMERGENCY dEPARTMENT eNCOUnter      Pt Name: Sheryl Bassett  MRN: [id number]  [place] 2000  Date of evaluation: 10/25/2022  Provider: Mike Salas MD      HISTORY OF PRESENT ILLNESS      Chief Complaint   Patient presents with    Back Pain     Low back pain, has been seen multiple times for same, apt tomorrow with ortho. The history is provided by the Patient. Sheryl Bassett is a 23 y.o. male with a PMH clinically significant for ADHD, Bipolar affective d/o presenting to the ED via PV c/o lower back pain with radiation down bilateral lower extremities that is been ongoing for the past month. Unsure what he did to cause the pain in the first place, but does do a strenuous job. Frequently bending up and down. Denies any associated numbness, weakness, tingling. Characterizes pain as aching, constant and moderate in severity. Radiates down the lateral and anterior aspects of bilateral upper legs. Not improved with any certain position. States that he has been taking the Norco and other medications at home without significant relief. Thinks that the steroids did help him at one of the visits. Denies any associated saddle anesthesia, urinary/bowel incontinence. No IVDU or anticoagulation. Denies significant traumatic injury. No numbness, but does intermittently say paresthesias in these areas. Otherwise feeling well. No F/C/D, neck pain, headache, upper back pain, abdominal pain, N/V/D/C. Has an appointment with orthopedics for the same issue tomorrow morning. Has been able to ambulate without difficulty, just causes pain. Per Chart Review: Recent evaluations in the ED appreciated. CT of the lumbar spine without evidence of acute abnormalities. REVIEW OF SYSTEMS       Review of Systems   Constitutional:  Negative for chills and fever. HENT:  Negative for rhinorrhea and sore throat. Eyes:  Negative for pain and visual disturbance.    Respiratory: Negative for cough and shortness of breath. Cardiovascular:  Negative for chest pain and palpitations. Gastrointestinal:  Negative for abdominal pain, diarrhea, nausea and vomiting. Genitourinary:  Negative for dysuria. Musculoskeletal:  Positive for back pain and myalgias. Negative for neck pain. Skin:  Negative for rash. Neurological:  Negative for weakness, numbness and headaches. Paresthesias     PAST MEDICAL HISTORY     Past Medical History:   Diagnosis Date    Acne     Eczema        SURGICAL HISTORY     No past surgical history on file. FAMILY HISTORY     No family history on file. SOCIAL HISTORY       Social History     Socioeconomic History    Marital status: Single   Tobacco Use    Smoking status: Never    Smokeless tobacco: Never   Substance and Sexual Activity    Alcohol use: Never    Drug use: No       CURRENT MEDICATIONS       Discharge Medication List as of 10/25/2022 11:27 PM        CONTINUE these medications which have NOT CHANGED    Details   HYDROcodone-acetaminophen (NORCO) 5-325 MG per tablet Take 1 tablet by mouth every 6 hours as needed for Pain for up to 3 days. Intended supply: 3 days.  Take lowest dose possible to manage pain, Disp-10 tablet, R-0Print      cyclobenzaprine (FLEXERIL) 10 MG tablet Take 1 tablet by mouth 3 times daily as needed for Muscle spasms, Disp-21 tablet, R-0Print      ketorolac (TORADOL) 10 MG tablet Take 1 tablet by mouth every 6 hours as needed for Pain, Disp-20 tablet, R-0Print      predniSONE (DELTASONE) 10 MG tablet 5 tabs po qam for 2 days then 4,3,2,1 tabs qam for 2 days each total of 10 days, Disp-30 tablet, R-0Print      benzonatate (TESSALON PERLES) 100 MG capsule Take 1 capsule by mouth 3 times daily as needed for Cough, Disp-20 capsule,R-0Print      famotidine (PEPCID) 20 MG tablet Take 1 tablet by mouth daily for 15 days, Disp-15 tablet, R-0Print             ALLERGIES     Augmentin [amoxicillin-pot clavulanate], Red dye, and Tylenol [acetaminophen]      PHYSICAL EXAM       ED Triage Vitals [10/25/22 2030]   BP Temp Temp Source Heart Rate Resp SpO2 Height Weight   (!) 144/96 97.8 °F (36.6 °C) Temporal (!) 103 16 98 % 5' 7\" (1.702 m) 160 lb (72.6 kg)       Physical Exam  Vitals and nursing note reviewed. Constitutional:       General: He is not in acute distress. Appearance: He is not ill-appearing. HENT:      Head: Normocephalic and atraumatic. Mouth/Throat:      Mouth: Mucous membranes are moist.      Pharynx: Oropharynx is clear. Eyes:      Extraocular Movements: Extraocular movements intact. Pupils: Pupils are equal, round, and reactive to light. Cardiovascular:      Rate and Rhythm: Normal rate and regular rhythm. Pulses: Normal pulses. Heart sounds: Normal heart sounds. Pulmonary:      Effort: Pulmonary effort is normal.      Breath sounds: Normal breath sounds. Abdominal:      General: There is no distension. Palpations: Abdomen is soft. Tenderness: There is no abdominal tenderness. Musculoskeletal:      Cervical back: Normal range of motion and neck supple. No tenderness or bony tenderness. Thoracic back: No tenderness or bony tenderness. Lumbar back: Spasms and tenderness present. No edema, deformity, signs of trauma or bony tenderness. Normal range of motion. Negative right straight leg raise test and negative left straight leg raise test.        Back:       Right lower leg: No edema. Left lower leg: No edema. Skin:     General: Skin is warm and dry. Capillary Refill: Capillary refill takes less than 2 seconds. Neurological:      Mental Status: He is alert and oriented to person, place, and time. Mental status is at baseline. Sensory: Sensation is intact. No sensory deficit. Motor: Motor function is intact. No weakness. Deep Tendon Reflexes: Reflexes normal. Babinski sign absent on the right side. Babinski sign absent on the left side.       Comments: 2 beats of clonus bilaterally. Psychiatric:         Mood and Affect: Mood normal.         Behavior: Behavior normal.       MDM:   Chart Reviewed: PMH and additional information as noted in HPI obtained from chart review    Vitals:    Vitals:    10/25/22 2030   BP: (!) 144/96   Pulse: (!) 103   Resp: 16   Temp: 97.8 °F (36.6 °C)   TempSrc: Temporal   SpO2: 98%   Weight: 160 lb (72.6 kg)   Height: 5' 7\" (1.702 m)       PROCEDURES:  Unless otherwise noted below, none  Procedures    LABS:  Labs Reviewed - No data to display    No orders to display            23 y.o. male with a PMH clinically significant for ADHD, Bipolar affective d/o presenting to the ED via PV c/o lower back pain with radiation down bilateral lower extremities that is been ongoing for the past month. Upon initial evaluation, Pt Afebrile, HDS and in NAD. PE as noted above. Given findings, clinical presentation most likely consistent w/ lumbosacral strain with radiculopathy to bilateral thighs without physical exam findings or symptoms concerning for cauda equina or other significant spinal cord impingement at this time. No history of IVDU or anticoagulation with significant traumatic injuries. Patient otherwise largely well-appearing in the ED and with appropriate follow-up with orthopedics tomorrow. Highly encouraged the patient to go to this appointment. Meds as noted below for symptomatic relief. Patient understanding and amenable to the POC. Pt was administered   Medications   methocarbamol (ROBAXIN) tablet 1,500 mg (1,500 mg Oral Given 10/25/22 2329)   acetaminophen (TYLENOL) tablet 1,000 mg (1,000 mg Oral Given 10/25/22 2329)   ketorolac (TORADOL) injection 30 mg (30 mg IntraMUSCular Given 10/25/22 2329)   dexamethasone (DECADRON) tablet 12 mg (12 mg Oral Given 10/25/22 2329)       Plan: Discharge home in good condition with meds as noted below and instructions to follow up with PCPand ortho as previously scheduled.  Pt stable and appropriate for further evaluation and management as an outpatient. and Patient understanding and amenable to the POC. CRITICAL CARE TIME   Total CriticalCare time was 0 minutes, excluding separately reportable procedures. There was a high probability of clinically significant/life threatening deterioration in the patient's condition which required my urgent intervention. FINAL IMPRESSION      1. Lumbosacral strain, initial encounter    2.  Bilateral sciatica          DISPOSITION/PLAN   DISPOSITION Decision To Discharge 10/25/2022 11:24:25 PM      Discharge Medication List as of 10/25/2022 11:27 PM        START taking these medications    Details   acetaminophen (TYLENOL) 500 MG tablet Take 2 tablets by mouth every 6 hours as needed for Pain or Fever, Disp-60 tablet, R-0Print      methocarbamol (ROBAXIN) 500 MG tablet Take 2 tablets by mouth 4 times daily as needed (back pain) Do not take at the same time as Flexeril., Disp-40 tablet, R-0Print              MD Lo Rascon MD  10/31/22 1041

## 2022-11-13 ENCOUNTER — HOSPITAL ENCOUNTER (EMERGENCY)
Age: 22
Discharge: HOME | End: 2022-11-13
Payer: COMMERCIAL

## 2022-11-13 ENCOUNTER — APPOINTMENT (OUTPATIENT)
Dept: GENERAL RADIOLOGY | Age: 22
End: 2022-11-13
Payer: COMMERCIAL

## 2022-11-13 VITALS
HEART RATE: 89 BPM | BODY MASS INDEX: 25.11 KG/M2 | RESPIRATION RATE: 20 BRPM | TEMPERATURE: 97.9 F | WEIGHT: 160 LBS | OXYGEN SATURATION: 100 % | HEIGHT: 67 IN | DIASTOLIC BLOOD PRESSURE: 98 MMHG | SYSTOLIC BLOOD PRESSURE: 131 MMHG

## 2022-11-13 DIAGNOSIS — M54.50 ACUTE EXACERBATION OF CHRONIC LOW BACK PAIN: Primary | ICD-10-CM

## 2022-11-13 DIAGNOSIS — K59.03 DRUG-INDUCED CONSTIPATION: ICD-10-CM

## 2022-11-13 DIAGNOSIS — R11.2 NAUSEA AND VOMITING, UNSPECIFIED VOMITING TYPE: ICD-10-CM

## 2022-11-13 DIAGNOSIS — G89.29 ACUTE EXACERBATION OF CHRONIC LOW BACK PAIN: Primary | ICD-10-CM

## 2022-11-13 PROCEDURE — 74018 RADEX ABDOMEN 1 VIEW: CPT

## 2022-11-13 PROCEDURE — 96372 THER/PROPH/DIAG INJ SC/IM: CPT

## 2022-11-13 PROCEDURE — 6370000000 HC RX 637 (ALT 250 FOR IP): Performed by: PHYSICIAN ASSISTANT

## 2022-11-13 PROCEDURE — 6360000002 HC RX W HCPCS: Performed by: PHYSICIAN ASSISTANT

## 2022-11-13 PROCEDURE — 99284 EMERGENCY DEPT VISIT MOD MDM: CPT

## 2022-11-13 RX ORDER — ONDANSETRON 4 MG/1
4 TABLET, ORALLY DISINTEGRATING ORAL EVERY 8 HOURS PRN
Qty: 6 TABLET | Refills: 0 | Status: SHIPPED | OUTPATIENT
Start: 2022-11-13

## 2022-11-13 RX ORDER — TIZANIDINE 4 MG/1
4 TABLET ORAL 3 TIMES DAILY PRN
Qty: 30 TABLET | Refills: 0 | Status: SHIPPED | OUTPATIENT
Start: 2022-11-13

## 2022-11-13 RX ORDER — PREDNISONE 10 MG/1
40 TABLET ORAL DAILY
Qty: 20 TABLET | Refills: 0 | Status: SHIPPED | OUTPATIENT
Start: 2022-11-13 | End: 2022-11-18

## 2022-11-13 RX ORDER — ONDANSETRON 4 MG/1
4 TABLET, ORALLY DISINTEGRATING ORAL ONCE
Status: COMPLETED | OUTPATIENT
Start: 2022-11-13 | End: 2022-11-13

## 2022-11-13 RX ORDER — KETOROLAC TROMETHAMINE 30 MG/ML
60 INJECTION, SOLUTION INTRAMUSCULAR; INTRAVENOUS ONCE
Status: COMPLETED | OUTPATIENT
Start: 2022-11-13 | End: 2022-11-13

## 2022-11-13 RX ORDER — POLYETHYLENE GLYCOL 3350 17 G/17G
17 POWDER, FOR SOLUTION ORAL DAILY
Qty: 238 G | Refills: 0 | Status: SHIPPED | OUTPATIENT
Start: 2022-11-13 | End: 2022-11-20

## 2022-11-13 RX ORDER — METHYLPREDNISOLONE ACETATE 80 MG/ML
80 INJECTION, SUSPENSION INTRA-ARTICULAR; INTRALESIONAL; INTRAMUSCULAR; SOFT TISSUE ONCE
Status: COMPLETED | OUTPATIENT
Start: 2022-11-13 | End: 2022-11-13

## 2022-11-13 RX ADMIN — METHYLPREDNISOLONE ACETATE 80 MG: 80 INJECTION, SUSPENSION INTRA-ARTICULAR; INTRALESIONAL; INTRAMUSCULAR; SOFT TISSUE at 21:32

## 2022-11-13 RX ADMIN — ONDANSETRON 4 MG: 4 TABLET, ORALLY DISINTEGRATING ORAL at 21:32

## 2022-11-13 RX ADMIN — KETOROLAC TROMETHAMINE 60 MG: 30 INJECTION, SOLUTION INTRAMUSCULAR at 21:31

## 2022-11-13 ASSESSMENT — ENCOUNTER SYMPTOMS
NAUSEA: 1
EYE PAIN: 0
SHORTNESS OF BREATH: 0
BACK PAIN: 1
COLOR CHANGE: 0
TROUBLE SWALLOWING: 0
APNEA: 0
VOMITING: 1
ALLERGIC/IMMUNOLOGIC NEGATIVE: 1

## 2022-11-13 ASSESSMENT — PAIN DESCRIPTION - DESCRIPTORS
DESCRIPTORS: ACHING
DESCRIPTORS: ACHING

## 2022-11-13 ASSESSMENT — PAIN - FUNCTIONAL ASSESSMENT
PAIN_FUNCTIONAL_ASSESSMENT: 0-10
PAIN_FUNCTIONAL_ASSESSMENT: NONE - DENIES PAIN

## 2022-11-13 ASSESSMENT — PAIN DESCRIPTION - ORIENTATION
ORIENTATION: LEFT
ORIENTATION: LOWER

## 2022-11-13 ASSESSMENT — PAIN SCALES - GENERAL: PAINLEVEL_OUTOF10: 8

## 2022-11-13 ASSESSMENT — PAIN DESCRIPTION - LOCATION
LOCATION: BACK
LOCATION: LEG

## 2022-11-13 NOTE — Clinical Note
Herminia Hodge was seen and treated in our emergency department on 11/13/2022. He may return to work on 11/15/2022. If you have any questions or concerns, please don't hesitate to call.       Roz Ozuna PA-C

## 2022-11-14 NOTE — ED PROVIDER NOTES
[address] [facility] ED  eMERGENCYdEPARTMENT eNCOUnter      Pt Name: Parminder Shah  MRN: [id number]  [place] 2000  Date of evaluation: 11/13/2022  Provider:Chuck Silva PA-C    CHIEF COMPLAINT       Chief Complaint   Patient presents with    Back Pain    Emesis         HISTORY OF PRESENT ILLNESS  (Location/Symptom, Timing/Onset, Context/Setting, Quality, Duration, Modifying Factors, Severity.)   Parminder Shah is a 23 y.o. male who presents to the emergency department with dual complaints of nausea and vomiting after eating Lynnetta Pérez this evening as well as ongoing back pain. Patient was seen last month for the back pain and had a CT scan was unremarkable and was subsequently referred for to orthopedics. Patient does state that he follow-up with orthopedics and they have him scheduled to start physical therapy however he states that the pain medicine that he was given has not been helping and the pain is progressively worsened. Patient denies any saddle paresthesias or loss of bowel or bladder control. Patient denies any falls or blunt trauma to the area. HPI    Nursing Notes were reviewed and I agree. REVIEW OF SYSTEMS    (2-9 systems for level 4, 10 or more for level 5)     Review of Systems   Constitutional:  Negative for diaphoresis and fever. HENT:  Negative for hearing loss and trouble swallowing. Eyes:  Negative for pain. Respiratory:  Negative for apnea and shortness of breath. Cardiovascular:  Negative for chest pain. Gastrointestinal:  Positive for nausea and vomiting. Endocrine: Negative. Genitourinary:  Negative for hematuria. Musculoskeletal:  Positive for back pain. Negative for neck pain and neck stiffness. Skin:  Negative for color change. Allergic/Immunologic: Negative. Neurological:  Negative for dizziness and numbness. Hematological: Negative. Psychiatric/Behavioral: Negative. All other systems reviewed and are negative.      Except as noted above the remainder of the review of systems was reviewed and negative. PAST MEDICAL HISTORY     Past Medical History:   Diagnosis Date    Acne     Eczema          SURGICAL HISTORY     No past surgical history on file. CURRENT MEDICATIONS       Previous Medications    ACETAMINOPHEN (TYLENOL) 500 MG TABLET    Take 2 tablets by mouth every 6 hours as needed for Pain or Fever    BENZONATATE (TESSALON PERLES) 100 MG CAPSULE    Take 1 capsule by mouth 3 times daily as needed for Cough    FAMOTIDINE (PEPCID) 20 MG TABLET    Take 1 tablet by mouth daily for 15 days    KETOROLAC (TORADOL) 10 MG TABLET    Take 1 tablet by mouth every 6 hours as needed for Pain       ALLERGIES     Augmentin [amoxicillin-pot clavulanate], Red dye, and Tylenol [acetaminophen]    FAMILY HISTORY     No family history on file. SOCIAL HISTORY       Social History     Socioeconomic History    Marital status: Single   Tobacco Use    Smoking status: Never    Smokeless tobacco: Never   Substance and Sexual Activity    Alcohol use: Never    Drug use: No       SCREENINGS    Gem Coma Scale  Eye Opening: Spontaneous  Best Verbal Response: Oriented  Best Motor Response: Obeys commands  Glen Coma Scale Score: 15      PHYSICAL EXAM    (up to 7 forlevel 4, 8 or more for level 5)     ED Triage Vitals [11/13/22 2108]   BP Temp Temp Source Heart Rate Resp SpO2 Height Weight   (!) 131/98 97.9 °F (36.6 °C) Oral 89 -- -- 5' 7\" (1.702 m) 160 lb (72.6 kg)       Physical Exam  Vitals and nursing note reviewed. Constitutional:       General: He is not in acute distress. Appearance: He is well-developed. He is not diaphoretic. HENT:      Head: Normocephalic and atraumatic. Mouth/Throat:      Mouth: Mucous membranes are moist.      Pharynx: No oropharyngeal exudate. Eyes:      General: No scleral icterus. Conjunctiva/sclera: Conjunctivae normal.      Pupils: Pupils are equal, round, and reactive to light.    Neck:      Trachea: No tracheal deviation. Cardiovascular:      Rate and Rhythm: Normal rate. Heart sounds: Normal heart sounds. Pulmonary:      Effort: Pulmonary effort is normal. No respiratory distress. Breath sounds: Normal breath sounds. Abdominal:      General: Bowel sounds are normal. There is no distension. Palpations: Abdomen is soft. Tenderness: There is abdominal tenderness in the right upper quadrant. There is no guarding or rebound. Musculoskeletal:         General: Normal range of motion. Cervical back: Normal range of motion and neck supple. No rigidity or tenderness. Lumbar back: Spasms and tenderness present. Back:       Comments: Painful ROM of lumbar spine   Skin:     General: Skin is warm and dry. Findings: No erythema or rash. Neurological:      Mental Status: He is alert and oriented to person, place, and time. Cranial Nerves: No cranial nerve deficit. Motor: No abnormal muscle tone. Psychiatric:         Behavior: Behavior normal.         Thought Content: Thought content normal.         Judgment: Judgment normal.         DIAGNOSTIC RESULTS     RADIOLOGY:   Non-plain film images such as CT, Ultrasound and MRI are read by the radiologist. Plain radiographic images are visualized and preliminarilyinterpreted by Shanice Ortiz PA-C with the below findings:    fos    Interpretation per the Radiologist below, if available at the time of this note:    XR ABDOMEN (KUB) (SINGLE AP VIEW)    (Results Pending)       LABS:  Labs Reviewed - No data to display    All other labs were within normal range or not returnedas of this dictation.     EMERGENCYDEPARTMENT COURSE and DIFFERENTIAL DIAGNOSIS/MDM:   Vitals:    Vitals:    11/13/22 2108   BP: (!) 131/98   Pulse: 89   Temp: 97.9 °F (36.6 °C)   TempSrc: Oral   Weight: 160 lb (72.6 kg)   Height: 5' 7\" (1.702 m)       REASSESSMENT        Presented with dull complaints of nausea and vomiting after eating as well as ongoing lower back pain. He had no saddle paresthesia or loss of bowel or bladder control to suggest cauda equina syndrome. No falls or blunt trauma to warrant radiographic studies. Patient already seeing orthopedics. Abdominal x-ray does reveal constipation, and given that patient has been on hydrocodone for his back pain I discussed opioid-induced constipation with the patient. He will be discharged home with supportive therapy for his back, MiraLAX and antiemetic and referred back to his PCP as well as orthopedic for outpatient follow-up    MDM      PROCEDURES:    Procedures      FINAL IMPRESSION      1. Acute exacerbation of chronic low back pain    2. Nausea and vomiting, unspecified vomiting type    3.  Drug-induced constipation          DISPOSITION/PLAN   DISPOSITION Decision To Discharge 11/13/2022 10:03:30 PM      PATIENT REFERRED TO:  [facility]OUR ORTHOPEDIC    Call in 1 day      DISCHARGE MEDICATIONS:  New Prescriptions    ONDANSETRON (ZOFRAN ODT) 4 MG DISINTEGRATING TABLET    Take 1 tablet by mouth every 8 hours as needed for Nausea    POLYETHYLENE GLYCOL (MIRALAX) 17 GM/SCOOP POWDER    Take 17 g by mouth daily for 7 days PRN constipation    PREDNISONE (DELTASONE) 10 MG TABLET    Take 4 tablets by mouth daily for 5 doses    TIZANIDINE (ZANAFLEX) 4 MG TABLET    Take 1 tablet by mouth 3 times daily as needed (back pain)       (Please note that portions of this note were completed with a voice recognition program.  Efforts were made to edit the dictations but occasionally words are mis-transcribed.)    RAÚL Saha PA-C  11/13/22 3788

## 2022-11-14 NOTE — ED TRIAGE NOTES
Patient to ER primarily for back pain that has been ongoing for one month. He followed up with orthopedics and they are having him start physical therapy this week. The pain has continued to worsen and he does not have medications to help control the pain. No bowel/bladder incontinence. No radiation down the leg at this time. Additionally he reports emesis x1 hour after eating [place]. This has resolved. No abdominal pain at this time.

## 2023-02-22 LAB
ALANINE AMINOTRANSFERASE (SGPT) (U/L) IN SER/PLAS: 34 U/L (ref 10–52)
ALBUMIN (G/DL) IN SER/PLAS: 4.2 G/DL (ref 3.4–5)
ALKALINE PHOSPHATASE (U/L) IN SER/PLAS: 70 U/L (ref 33–120)
ANION GAP IN SER/PLAS: 10 MMOL/L (ref 10–20)
ASPARTATE AMINOTRANSFERASE (SGOT) (U/L) IN SER/PLAS: 20 U/L (ref 9–39)
BILIRUBIN TOTAL (MG/DL) IN SER/PLAS: 0.4 MG/DL (ref 0–1.2)
CALCIUM (MG/DL) IN SER/PLAS: 9.8 MG/DL (ref 8.6–10.3)
CARBON DIOXIDE, TOTAL (MMOL/L) IN SER/PLAS: 32 MMOL/L (ref 21–32)
CHLORIDE (MMOL/L) IN SER/PLAS: 103 MMOL/L (ref 98–107)
CREATININE (MG/DL) IN SER/PLAS: 1.01 MG/DL (ref 0.5–1.3)
ERYTHROCYTE DISTRIBUTION WIDTH (RATIO) BY AUTOMATED COUNT: 12.7 % (ref 11.5–14.5)
ERYTHROCYTE MEAN CORPUSCULAR HEMOGLOBIN CONCENTRATION (G/DL) BY AUTOMATED: 32.3 G/DL (ref 32–36)
ERYTHROCYTE MEAN CORPUSCULAR VOLUME (FL) BY AUTOMATED COUNT: 83 FL (ref 80–100)
ERYTHROCYTES (10*6/UL) IN BLOOD BY AUTOMATED COUNT: 5.28 X10E12/L (ref 4.5–5.9)
GFR MALE: >90 ML/MIN/1.73M2
GLUCOSE (MG/DL) IN SER/PLAS: 75 MG/DL (ref 74–99)
HEMATOCRIT (%) IN BLOOD BY AUTOMATED COUNT: 43.9 % (ref 41–52)
HEMOGLOBIN (G/DL) IN BLOOD: 14.2 G/DL (ref 13.5–17.5)
LEUKOCYTES (10*3/UL) IN BLOOD BY AUTOMATED COUNT: 6.6 X10E9/L (ref 4.4–11.3)
PLATELETS (10*3/UL) IN BLOOD AUTOMATED COUNT: 222 X10E9/L (ref 150–450)
POTASSIUM (MMOL/L) IN SER/PLAS: 3.6 MMOL/L (ref 3.5–5.3)
PROTEIN TOTAL: 6.9 G/DL (ref 6.4–8.2)
SODIUM (MMOL/L) IN SER/PLAS: 141 MMOL/L (ref 136–145)
UREA NITROGEN (MG/DL) IN SER/PLAS: 18 MG/DL (ref 6–23)

## 2023-02-23 LAB
FLU A RESULT: NOT DETECTED
FLU B RESULT: NOT DETECTED
SARS-COV-2 RESULT: NOT DETECTED

## 2023-03-13 ENCOUNTER — TELEPHONE (OUTPATIENT)
Dept: PRIMARY CARE | Facility: CLINIC | Age: 23
End: 2023-03-13
Payer: COMMERCIAL

## 2023-03-13 NOTE — LETTER
March 13, 2023     Patient: Facundo Thomas   YOB: 2000   Date of Visit: 3/13/2023       To Whom It May Concern:    Facundo Thomas was seen in my clinic on 3/13/2023 at ?. Please excuse Facundo for his absence from work on this day to make the appointment.    If you have any questions or concerns, please don't hesitate to call.         Sincerely,         Дмитрий Bill, DO

## 2023-03-23 ENCOUNTER — OFFICE VISIT (OUTPATIENT)
Dept: PRIMARY CARE | Facility: CLINIC | Age: 23
End: 2023-03-23
Payer: COMMERCIAL

## 2023-03-23 VITALS
BODY MASS INDEX: 27.62 KG/M2 | HEIGHT: 67 IN | RESPIRATION RATE: 16 BRPM | HEART RATE: 96 BPM | DIASTOLIC BLOOD PRESSURE: 80 MMHG | OXYGEN SATURATION: 97 % | WEIGHT: 176 LBS | SYSTOLIC BLOOD PRESSURE: 118 MMHG

## 2023-03-23 DIAGNOSIS — J01.10 ACUTE NON-RECURRENT FRONTAL SINUSITIS: Primary | ICD-10-CM

## 2023-03-23 DIAGNOSIS — R51.9 ACUTE NONINTRACTABLE HEADACHE, UNSPECIFIED HEADACHE TYPE: ICD-10-CM

## 2023-03-23 DIAGNOSIS — R53.83 OTHER FATIGUE: ICD-10-CM

## 2023-03-23 PROCEDURE — 99213 OFFICE O/P EST LOW 20 MIN: CPT | Performed by: NURSE PRACTITIONER

## 2023-03-23 RX ORDER — AZITHROMYCIN 500 MG/1
500 TABLET, FILM COATED ORAL DAILY
Qty: 7 TABLET | Refills: 0 | Status: SHIPPED | OUTPATIENT
Start: 2023-03-23 | End: 2023-03-30

## 2023-03-23 NOTE — PROGRESS NOTES
"Subjective   Patient ID: Facundo Thomas is a 22 y.o. male who presents for Influenza (Patient came in with symptoms of migraine, chills, body aches took ibuprofen with no help symptoms been for 1x day .).    HPI     Review of Systems    Objective   /80 (BP Location: Left arm, Patient Position: Sitting, BP Cuff Size: Adult)   Pulse 96   Resp 16   Ht 1.702 m (5' 7\")   Wt 79.8 kg (176 lb) Comment: keep  SpO2 97%   BMI 27.57 kg/m²     Physical Exam    Assessment/Plan          "

## 2023-03-23 NOTE — PROGRESS NOTES
Subjective   Patient ID: Facundo Thomas is a 22 y.o. male who is with chief complaint of headache.   HPI  Patient is a 22 y.o. male who CONSULTED AT OFFICE CLINIC today. Patient is with complaints of headache, frontal sinus pain, fatigue, muscle aches, and chills. He denies having nasal congestion, nasal discharge, sore throat, cough, loss of sense of taste, loss of sense of smell, diarrhea, nor fever. Patient states that present condition started yesterdat after being exposed to girlfriend who is receiving antibiotics for URI. he denies shortness of breath, chest pain, palpitations, nor edema. he stated that he  tried OTC medications which afforded only slight relief of symptoms. he denies nausea, vomiting, abdominal pain, nor any other symptoms.    Patient states he had not received any COVID vaccine yet.  Patient states he have not yet received flu shot for this season.    Patient states that he opted to have no COVID test requested at this time. he states that he will do COVID test by [facility] depending on symptoms progression.  ---------------------------------------------------  Sinus pain (Patient came in with symptoms of migraine, chills, body aches took ibuprofen with no help symptoms been for 1x day .)  Note by RAVIN Villavicencio  ------------------------------------------------------    Review of Systems  General: no weight loss, generally healthy, (+) fatigue  Head: (+) headaches, (+) frontal sinus pain, no vertigo, no injury  Eyes: no diplopia, no tearing, no pain,   Ears: no change in hearing, no tinnitus, no bleeding, no vertigo  Mouth:  no dental difficulties, no gingival bleeding, no sore throat, no loss of sense of taste  Nose: no congestion, no  discharge, no bleeding, no obstruction, no loss of sense of smell  Neck: no stiffness, no pain, no tenderness, no masses, no bruit  Pulmonary: no dyspnea, no wheezing, no hemoptysis, no cough  Cardiovascular: no chest pain, no palpitations, no syncope, no  orthopnea  Gastrointestinal: no change in appetite, no dysphagia, no abdominal pains, no diarrhea, no emesis, no melena  Genito Urinary: no dysuria, no urinary urgency, no nocturia, no incontinence, no change in nature of urine  Musculoskeletal: (+) muscle ache, no joint pain, no limitation of range of motion, no paresthesia, no numbness  Constitutional: no fever, (+) chills, no night sweats    Objective   Physical Exam  General: ambulatory, in no acute distress  Head: normocephalic, no lesions, (+) frontal sinus tenderness  Eyes: pink palpebral conjunctiva, anicteric sclerae, PERRLA, EOM's full  Ears: clear external auditory canals, no ear discharge, no bleeding from the ears, tympanic membrane intact  Nose: (+) congested nasal mucosa, no nasal discharge, no bleeding, no obstruction  Throat: no erythema, no congestion, no lesion  Neck: supple, no masses, no bruits, no CLADP  Chest: symmetrical chest expansion, no lagging, no retractions, clear breath sounds, no rales, no wheezes    Assessment/Plan   Problem List Items Addressed This Visit    None  Visit Diagnoses       Acute non-recurrent frontal sinusitis    -  Primary    Relevant Medications    azithromycin (Zithromax) 500 mg tablet    Other fatigue        Relevant Medications    azithromycin (Zithromax) 500 mg tablet    Acute nonintractable headache, unspecified headache type        Relevant Medications    azithromycin (Zithromax) 500 mg tablet        DISCHARGE SUMMARY:   Patient was seen and examined. Diagnosis, treatment, treatment options, and possible complications of today's illness discussed and explained to patient. Patient to take medication/s associated with this visit. Patient may also take OTC analgesic/antipyretic if needed for pain/fever. Advised to increase oral fluid intake. Advised steam inhalation if needed to relieve congestion. Advised warm saline gargle if needed to relieve throat discomfort. Advised to come back if with worsening or  persistent symptoms. Patient verbalized understanding of plan of care.    Patient to come back in 7 - 10 days if needed for worsening symptoms.

## 2023-04-11 ENCOUNTER — TELEPHONE (OUTPATIENT)
Dept: PRIMARY CARE | Facility: CLINIC | Age: 23
End: 2023-04-11
Payer: COMMERCIAL

## 2023-04-11 NOTE — TELEPHONE ENCOUNTER
Patient said sciatic nerve is bothering him in his back again and he has been in extreme pain for the past two days  He's on your schedule for a virtual 4/19 at 2:40pm  Would you rather him come into the office for this or should he go to an urgent care or ER to be seen sooner?  He's wanting your advice on how to best go about this, please advise thank you!

## 2023-04-12 ENCOUNTER — OFFICE VISIT (OUTPATIENT)
Dept: PRIMARY CARE | Facility: CLINIC | Age: 23
End: 2023-04-12
Payer: COMMERCIAL

## 2023-04-12 DIAGNOSIS — G89.29 CHRONIC LOW BACK PAIN, UNSPECIFIED BACK PAIN LATERALITY, UNSPECIFIED WHETHER SCIATICA PRESENT: Primary | ICD-10-CM

## 2023-04-12 DIAGNOSIS — M54.50 CHRONIC LOW BACK PAIN, UNSPECIFIED BACK PAIN LATERALITY, UNSPECIFIED WHETHER SCIATICA PRESENT: Primary | ICD-10-CM

## 2023-04-12 PROBLEM — R31.9 HEMATURIA: Status: ACTIVE | Noted: 2023-04-12

## 2023-04-12 PROBLEM — H52.11 MYOPIA OF RIGHT EYE: Status: ACTIVE | Noted: 2019-04-08

## 2023-04-12 PROBLEM — H52.221 REGULAR ASTIGMATISM, RIGHT EYE: Status: ACTIVE | Noted: 2019-04-08
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
## 2023-04-12 PROBLEM — E78.5 HYPERLIPIDEMIA: Status: ACTIVE | Noted: 2023-04-12

## 2023-04-12 PROBLEM — S39.012A STRAIN OF LUMBAR REGION: Status: ACTIVE | Noted: 2023-04-12

## 2023-04-12 PROBLEM — M54.9 CHRONIC BACK PAIN: Status: ACTIVE | Noted: 2023-04-12

## 2023-04-12 PROBLEM — F31.10 BIPOLAR DISORDER, CURRENT EPISODE MANIC WITHOUT PSYCHOTIC FEATURES (MULTI): Status: ACTIVE | Noted: 2023-04-12
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
## 2023-04-12 PROBLEM — M54.16 LUMBAR RADICULOPATHY: Status: ACTIVE | Noted: 2023-04-12

## 2023-04-12 PROBLEM — F33.1 MODERATE EPISODE OF RECURRENT MAJOR DEPRESSIVE DISORDER (MULTI): Status: ACTIVE | Noted: 2023-04-12

## 2023-04-12 PROBLEM — F90.2 ADHD (ATTENTION DEFICIT HYPERACTIVITY DISORDER), COMBINED TYPE: Status: ACTIVE | Noted: 2023-04-12

## 2023-04-12 PROCEDURE — 99213 OFFICE O/P EST LOW 20 MIN: CPT | Performed by: FAMILY MEDICINE

## 2023-04-12 RX ORDER — PREDNISONE 50 MG/1
50 TABLET ORAL DAILY
Qty: 5 TABLET | Refills: 0 | Status: SHIPPED | OUTPATIENT
Start: 2023-04-12 | End: 2023-04-17

## 2023-04-12 ASSESSMENT — ENCOUNTER SYMPTOMS
PARESTHESIAS: 0
NUMBNESS: 0
LEG PAIN: 0
BACK PAIN: 1

## 2023-04-12 NOTE — PROGRESS NOTES
Subjective   Patient ID: Facundo Thomas is a 22 y.o. male who presents for Back Pain.    Back Pain  This is a recurrent problem. The pain is present in the lumbar spine. Pertinent negatives include no leg pain, numbness or paresthesias. He has tried walking for the symptoms.      He developed pain in his low back a few days ago.  No injury prior to onset.  Pain is located left lower back.  This is a sharp 8 out of 10 intensity pain worse with movement.  Pain does not radiate to lower extremities.  No lower extremity numbness or tingling.  No saddle anesthesia.  He is taking ibuprofen without improvement  He does have a history of similar episodes of back pain in the past.  He previously had a MRI of his lumbar spine in October 2022 which was unremarkable    Review of Systems   Musculoskeletal:  Positive for back pain.   Neurological:  Negative for numbness and paresthesias.       Objective   There were no vitals taken for this visit.    Physical Exam  Constitutional:       General: He is not in acute distress.     Appearance: Normal appearance. He is well-developed.   Pulmonary:      Effort: Pulmonary effort is normal.   Skin:     Findings: No rash.   Neurological:      Mental Status: He is alert.   Psychiatric:         Mood and Affect: Mood normal.         Behavior: Behavior normal.         Assessment/Plan   Problem List Items Addressed This Visit          Medium    Chronic back pain - Primary    Relevant Medications    predniSONE (Deltasone) 50 mg tablet   With worsening back pain over the last several days.  No sciatica or red flag symptoms.  Since this has not been improving with ibuprofen we will treat with a 5-day burst of prednisone 50 mg daily.  Follow-up in 1 to 2 weeks if not improving.  Also discussed rest and avoiding any aggravating factors and heavy lifting

## 2023-04-24 ENCOUNTER — OFFICE VISIT (OUTPATIENT)
Dept: PRIMARY CARE | Facility: CLINIC | Age: 23
End: 2023-04-24
Payer: COMMERCIAL

## 2023-04-24 VITALS
RESPIRATION RATE: 16 BRPM | HEART RATE: 88 BPM | WEIGHT: 176 LBS | SYSTOLIC BLOOD PRESSURE: 122 MMHG | DIASTOLIC BLOOD PRESSURE: 82 MMHG | OXYGEN SATURATION: 96 % | BODY MASS INDEX: 27.57 KG/M2

## 2023-04-24 DIAGNOSIS — G89.29 CHRONIC LOW BACK PAIN, UNSPECIFIED BACK PAIN LATERALITY, UNSPECIFIED WHETHER SCIATICA PRESENT: Primary | ICD-10-CM

## 2023-04-24 DIAGNOSIS — M54.50 CHRONIC LOW BACK PAIN, UNSPECIFIED BACK PAIN LATERALITY, UNSPECIFIED WHETHER SCIATICA PRESENT: Primary | ICD-10-CM

## 2023-04-24 PROCEDURE — 99213 OFFICE O/P EST LOW 20 MIN: CPT | Performed by: FAMILY MEDICINE

## 2023-04-24 RX ORDER — MELOXICAM 15 MG/1
15 TABLET ORAL DAILY PRN
Qty: 14 TABLET | Refills: 0 | Status: SHIPPED | OUTPATIENT
Start: 2023-04-24 | End: 2023-07-25 | Stop reason: ALTCHOICE

## 2023-04-24 ASSESSMENT — ENCOUNTER SYMPTOMS
TINGLING: 0
LEG PAIN: 1
NUMBNESS: 0
INABILITY TO BEAR WEIGHT: 0
PARESTHESIAS: 0
BACK PAIN: 1

## 2023-04-24 NOTE — PROGRESS NOTES
Subjective   Patient ID: Facundo Thomas is a 22 y.o. male who presents for Back Pain and Leg Pain.    Back Pain  This is a chronic problem. The problem has been gradually worsening since onset. The pain is at a severity of 8/10. The pain is The same all the time. The symptoms are aggravated by bending. Associated symptoms include leg pain. Pertinent negatives include no numbness, paresthesias or tingling.   Leg Pain   The pain is present in the left leg. Pertinent negatives include no inability to bear weight, numbness or tingling.      He is here today for follow-up on low back pain  He was seen 12 days ago with a several day history of pain in his left lower back.  At that time we treated him with a 5-day course of prednisone 50 mg daily.  He feels that the prednisone had helped, however on Thursday of last week he picked up his son and felt a pop.  Since then he has had worsening back pain involving his left lower back.  This is located left lower back and he rates it as an 8-9 out of 10 intensity pain.  Pain is sharp and worse with activity.  Pain will radiate down his left leg and he will get intermittent paresthesias involving the posterior aspect of his left leg.  No fever, abdominal pain or loss of bowel or bladder control.  He is taking ibuprofen without much improvement  He does have a history of similar back pain this past fall.  Previously had an unremarkable CT scan of his lumbar spine October 2022          Review of Systems   Musculoskeletal:  Positive for back pain.   Neurological:  Negative for tingling, numbness and paresthesias.       Objective   /82   Pulse 88   Resp 16   Wt 79.8 kg (176 lb)   SpO2 96%   BMI 27.57 kg/m²     Physical Exam  Vitals reviewed.   Constitutional:       General: He is not in acute distress.     Appearance: Normal appearance. He is well-developed.   HENT:      Head: Normocephalic.   Eyes:      Conjunctiva/sclera: Conjunctivae normal.   Cardiovascular:      Rate  and Rhythm: Normal rate and regular rhythm.      Heart sounds: Normal heart sounds.   Pulmonary:      Effort: Pulmonary effort is normal.      Breath sounds: Normal breath sounds.   Musculoskeletal:      Right lower leg: No edema.      Left lower leg: No edema.      Comments: Lumbar spine: Tenderness involving left L2-L5 lumbar paraspinals.  Normal lumbar range of motion.  Lower extremity strength is plus 5 out of 5 with hip flexion, knee flexion and extension, and foot dorsiflexion/plantarflexion.  Patellar and Achilles reflexes are plus 2 out of 4 bilaterally   Skin:     Findings: No rash.   Neurological:      Mental Status: He is alert.   Psychiatric:         Mood and Affect: Mood normal.         Behavior: Behavior normal.         Assessment/Plan   Problem List Items Addressed This Visit          Medium    Chronic back pain - Primary    Relevant Medications    meloxicam (Mobic) 15 mg tablet   He presents today with pain in his left lower back which had most recently started 2 weeks ago, with history of similar back pain episodes in the past.  This had been improving with recent course of prednisone, before it was reaggravated lifting up his son last week.  We will stop ibuprofen and start meloxicam daily as needed.  Recommend ice, avoiding any aggravating factors, and gentle stretching.  He did do physical therapy last year which had helped with his back pain, and I recommended that he restart PT for this.  Follow-up in 2 to 4 weeks if not improving.  ED if severe pain or any other new or worsening symptoms

## 2023-05-17 ENCOUNTER — OFFICE VISIT (OUTPATIENT)
Dept: PRIMARY CARE | Facility: CLINIC | Age: 23
End: 2023-05-17
Payer: COMMERCIAL

## 2023-05-17 VITALS
DIASTOLIC BLOOD PRESSURE: 84 MMHG | WEIGHT: 180 LBS | OXYGEN SATURATION: 96 % | BODY MASS INDEX: 28.19 KG/M2 | SYSTOLIC BLOOD PRESSURE: 132 MMHG | HEART RATE: 72 BPM | RESPIRATION RATE: 18 BRPM

## 2023-05-17 DIAGNOSIS — L30.9 ECZEMA, UNSPECIFIED TYPE: Primary | ICD-10-CM

## 2023-05-17 PROCEDURE — 99213 OFFICE O/P EST LOW 20 MIN: CPT | Performed by: FAMILY MEDICINE

## 2023-05-17 NOTE — PROGRESS NOTES
Subjective   Patient ID: Facundo Thomas is a 22 y.o. male who presents for Rash.    Rash  This is a recurrent problem. The affected locations include the neck. The rash is characterized by burning and redness. Past treatments include anti-itch cream. The treatment provided no relief.        Here today to discuss rash.  He has had a rash on his right lateral neck present for the last several days.  The rash is itchy and also burns.  He has a history of eczema, and has had a similar rash in the past which had resolved with topical triamcinolone cream.  He started using topical triamcinolone at onset, and has not noticed much improvement yet.  He will sometimes get eczematous lesions on his arms, but does not have any other rash currently        Review of Systems   Skin:  Positive for rash.       Objective   /84   Pulse 72   Resp 18   Wt 81.6 kg (180 lb)   SpO2 96%   BMI 28.19 kg/m²     Physical Exam  Constitutional:       General: He is not in acute distress.     Appearance: Normal appearance. He is well-developed.   Pulmonary:      Effort: Pulmonary effort is normal.   Skin:     Findings: Rash present.      Comments: Dull erythematous scaly eczematous patch involving anterior-lateral aspect of right lower neck measuring approximately 3 to 4 cm in diameter.  No pustules or vesicles present   Neurological:      Mental Status: He is alert.   Psychiatric:         Mood and Affect: Mood normal.         Behavior: Behavior normal.         Assessment/Plan   Problem List Items Addressed This Visit          Medium    Eczema - Primary    Relevant Orders    Referral to Dermatology   Rash today is consistent with eczema.  He recently started using topical triamcinolone cream, and I recommended that he continue for up to an additional week.  Follow-up in 1 to 2 weeks if rash has not resolved with topical steroid.  He is interested in establishing with a dermatologist for his eczema and was given a referral today.

## 2023-05-22 ENCOUNTER — TELEPHONE (OUTPATIENT)
Dept: PRIMARY CARE | Facility: CLINIC | Age: 23
End: 2023-05-22
Payer: COMMERCIAL

## 2023-05-22 DIAGNOSIS — L21.9 SEBORRHEIC DERMATITIS OF SCALP: Primary | ICD-10-CM

## 2023-05-22 NOTE — TELEPHONE ENCOUNTER
"Pt said it's been a while and he doesn't remember the specific name of the shampoo, he believes it started with a \"K\"  I'm not sure if this is enough information for you, I told him you may want to set up an appointment.   Pt did say you referred him to a dermatologist so he said he could ask his dermatologist to prescribe this instead.  Please advise on what would be best, thank you!   "

## 2023-05-23 DIAGNOSIS — L21.9 SEBORRHEIC DERMATITIS OF SCALP: ICD-10-CM

## 2023-05-24 ENCOUNTER — TELEPHONE (OUTPATIENT)
Dept: PRIMARY CARE | Facility: CLINIC | Age: 23
End: 2023-05-24
Payer: COMMERCIAL

## 2023-05-24 DIAGNOSIS — L21.9 SEBORRHEIC DERMATITIS OF SCALP: ICD-10-CM

## 2023-05-24 DIAGNOSIS — M54.9 CHRONIC BACK PAIN, UNSPECIFIED BACK LOCATION, UNSPECIFIED BACK PAIN LATERALITY: Primary | ICD-10-CM

## 2023-05-24 DIAGNOSIS — G89.29 CHRONIC BACK PAIN, UNSPECIFIED BACK LOCATION, UNSPECIFIED BACK PAIN LATERALITY: Primary | ICD-10-CM

## 2023-05-24 RX ORDER — KETOCONAZOLE 10 MG/ML
SHAMPOO TOPICAL
Qty: 200 ML | Refills: 1 | Status: SHIPPED | OUTPATIENT
Start: 2023-05-24 | End: 2023-07-25 | Stop reason: ALTCHOICE

## 2023-05-24 RX ORDER — ONDANSETRON 4 MG/1
1 TABLET, FILM COATED ORAL EVERY 8 HOURS PRN
COMMUNITY
Start: 2022-10-06 | End: 2024-05-29 | Stop reason: SDUPTHER

## 2023-05-24 RX ORDER — KETOCONAZOLE 10 MG/ML
SHAMPOO TOPICAL
Qty: 200 ML | Refills: 1 | Status: SHIPPED | OUTPATIENT
Start: 2023-05-24 | End: 2023-05-24 | Stop reason: SDUPTHER

## 2023-05-24 RX ORDER — KETOROLAC TROMETHAMINE 10 MG/1
1 TABLET, FILM COATED ORAL EVERY 6 HOURS PRN
COMMUNITY
Start: 2022-10-15 | End: 2023-08-30

## 2023-05-24 RX ORDER — CYCLOBENZAPRINE HCL 5 MG
5 TABLET ORAL NIGHTLY PRN
Qty: 7 TABLET | Refills: 0 | Status: SHIPPED | OUTPATIENT
Start: 2023-05-24 | End: 2023-07-25 | Stop reason: ALTCHOICE

## 2023-05-24 RX ORDER — NAPROXEN 500 MG/1
1 TABLET ORAL 2 TIMES DAILY
COMMUNITY
Start: 2023-02-22 | End: 2023-08-30

## 2023-05-24 RX ORDER — IBUPROFEN 800 MG/1
1 TABLET ORAL
COMMUNITY
Start: 2023-01-30

## 2023-05-24 NOTE — TELEPHONE ENCOUNTER
Per pt [facility] won't cover Nizoral A-D 1 % shampoo  nees to be sent to [facility] in [place], please

## 2023-07-25 ENCOUNTER — OFFICE VISIT (OUTPATIENT)
Dept: PRIMARY CARE | Facility: CLINIC | Age: 23
End: 2023-07-25
Payer: COMMERCIAL

## 2023-07-25 VITALS
BODY MASS INDEX: 28.19 KG/M2 | RESPIRATION RATE: 18 BRPM | TEMPERATURE: 98.2 F | DIASTOLIC BLOOD PRESSURE: 76 MMHG | OXYGEN SATURATION: 98 % | WEIGHT: 180 LBS | HEART RATE: 84 BPM | SYSTOLIC BLOOD PRESSURE: 122 MMHG

## 2023-07-25 DIAGNOSIS — J02.9 SORE THROAT: Primary | ICD-10-CM

## 2023-07-25 LAB — POC RAPID STREP: NEGATIVE

## 2023-07-25 PROCEDURE — 87635 SARS-COV-2 COVID-19 AMP PRB: CPT

## 2023-07-25 PROCEDURE — 87880 STREP A ASSAY W/OPTIC: CPT | Performed by: NURSE PRACTITIONER

## 2023-07-25 PROCEDURE — 87651 STREP A DNA AMP PROBE: CPT

## 2023-07-25 PROCEDURE — 99213 OFFICE O/P EST LOW 20 MIN: CPT | Performed by: NURSE PRACTITIONER

## 2023-07-25 RX ORDER — FLUTICASONE PROPIONATE 50 MCG
1 SPRAY, SUSPENSION (ML) NASAL DAILY
Qty: 16 G | Refills: 0 | Status: SHIPPED | OUTPATIENT
Start: 2023-07-25 | End: 2023-08-30

## 2023-07-25 ASSESSMENT — ENCOUNTER SYMPTOMS
NAUSEA: 0
CHILLS: 1
WHEEZING: 0
SHORTNESS OF BREATH: 0
MYALGIAS: 0
SORE THROAT: 1
RHINORRHEA: 0
VOMITING: 0
ABDOMINAL PAIN: 0
FEVER: 0
COUGH: 1
FATIGUE: 1
HEADACHES: 1
DIARRHEA: 0

## 2023-07-25 NOTE — LETTER
July 25, 2023     Patient: Facundo Thomas   YOB: 2000   Date of Visit: 7/25/2023       To Whom It May Concern:    Facundo Thomas was seen in my clinic on 7/25/2023 at 12:40 pm. Please excuse Facundo for his absence from work on this day to make the appointment.    If you have any questions or concerns, please don't hesitate to call.         Sincerely,         Viktoria Vergara, APRN-CNP        CC: No Recipients

## 2023-07-25 NOTE — PROGRESS NOTES
Subjective   Patient ID: Facundo Thomas is a 23 y.o. male who presents for Sore Throat.    Patient has a sore throat that started yesterday. He did not take anything for his symptoms. Patient is not vaccinated against COVID. No fevers. Feeling well otherwise.          Review of Systems   Constitutional:  Positive for chills and fatigue. Negative for fever.   HENT:  Positive for sore throat. Negative for congestion, postnasal drip and rhinorrhea.    Respiratory:  Positive for cough. Negative for shortness of breath and wheezing.    Cardiovascular:  Negative for chest pain.   Gastrointestinal:  Negative for abdominal pain, diarrhea, nausea and vomiting.   Musculoskeletal:  Negative for myalgias.   Neurological:  Positive for headaches.     Objective   /76   Pulse 84   Temp 36.8 °C (98.2 °F) (Temporal)   Resp 18   Wt 81.6 kg (180 lb)   SpO2 98%   BMI 28.19 kg/m²     Physical Exam  Vitals reviewed.   Constitutional:       General: He is not in acute distress.     Appearance: Normal appearance. He is not ill-appearing or toxic-appearing.   HENT:      Head: Atraumatic.      Right Ear: Tympanic membrane, ear canal and external ear normal.      Left Ear: Tympanic membrane, ear canal and external ear normal.      Nose: Nose normal. No congestion or rhinorrhea.      Mouth/Throat:      Mouth: Mucous membranes are moist.      Pharynx: Oropharynx is clear. Posterior oropharyngeal erythema present. No oropharyngeal exudate.      Comments: Tonsils normal, uvula midline  Eyes:      Conjunctiva/sclera: Conjunctivae normal.   Cardiovascular:      Rate and Rhythm: Normal rate and regular rhythm.      Heart sounds: Normal heart sounds. No murmur heard.  Pulmonary:      Effort: Pulmonary effort is normal.      Breath sounds: Normal breath sounds. No wheezing or rhonchi.   Skin:     General: Skin is warm and dry.   Neurological:      General: No focal deficit present.      Mental Status: He is alert.   Psychiatric:          Mood and Affect: Mood normal.         Behavior: Behavior normal.     Assessment/Plan   Problem List Items Addressed This Visit    None  Visit Diagnoses       Sore throat    -  Primary    Relevant Medications    fluticasone (Flonase) 50 mcg/actuation nasal spray    Other Relevant Orders    Group A Streptococcus, PCR    Sars-CoV-2 PCR, Symptomatic    POCT rapid strep A manually resulted (Completed)        Rapid strep is negative. Will send back up strep culture. Will also get COVID testing. Pt to start on flonase. Advised patient on use of humidifier and hot steam treatments. Discussed that patient is to drink plenty of fluids and stay well hydrated. Can take motrin as needed for any fevers or discomfort. Patient can use flonase as well. Discussed that patient is to go to the ER for any chest pain, difficulty breathing, shortness of breath or new/concerning symptoms; he agreed. Will call pt when results become available. Pt reminded to self quarantine; he agreed.

## 2023-07-26 ENCOUNTER — TELEPHONE (OUTPATIENT)
Dept: PRIMARY CARE | Facility: CLINIC | Age: 23
End: 2023-07-26

## 2023-07-26 LAB
GROUP A STREP, PCR: NOT DETECTED
SARS-COV-2 RESULT: NOT DETECTED

## 2023-07-26 NOTE — TELEPHONE ENCOUNTER
----- Message from GERSON Nevarez-CNP sent at 7/26/2023  8:58 AM EDT -----  Let pt know that covid and strep are negative

## 2023-07-31 ENCOUNTER — OFFICE VISIT (OUTPATIENT)
Dept: PRIMARY CARE | Facility: CLINIC | Age: 23
End: 2023-07-31
Payer: COMMERCIAL

## 2023-07-31 VITALS
WEIGHT: 181 LBS | HEART RATE: 88 BPM | DIASTOLIC BLOOD PRESSURE: 92 MMHG | SYSTOLIC BLOOD PRESSURE: 142 MMHG | OXYGEN SATURATION: 98 % | BODY MASS INDEX: 28.35 KG/M2 | TEMPERATURE: 98.2 F | RESPIRATION RATE: 18 BRPM

## 2023-07-31 DIAGNOSIS — J02.9 SORE THROAT: Primary | ICD-10-CM

## 2023-07-31 LAB — POC RAPID STREP: NEGATIVE

## 2023-07-31 PROCEDURE — 87651 STREP A DNA AMP PROBE: CPT

## 2023-07-31 PROCEDURE — 87880 STREP A ASSAY W/OPTIC: CPT | Performed by: NURSE PRACTITIONER

## 2023-07-31 PROCEDURE — 87635 SARS-COV-2 COVID-19 AMP PRB: CPT

## 2023-07-31 PROCEDURE — 99213 OFFICE O/P EST LOW 20 MIN: CPT | Performed by: NURSE PRACTITIONER

## 2023-07-31 RX ORDER — AZITHROMYCIN 250 MG/1
TABLET, FILM COATED ORAL
Qty: 6 TABLET | Refills: 0 | Status: SHIPPED | OUTPATIENT
Start: 2023-07-31 | End: 2023-08-05

## 2023-07-31 ASSESSMENT — ENCOUNTER SYMPTOMS
VOMITING: 0
MYALGIAS: 0
CHILLS: 0
NAUSEA: 0
FATIGUE: 1
SORE THROAT: 1
WHEEZING: 0
HEADACHES: 1
ABDOMINAL PAIN: 0
SHORTNESS OF BREATH: 0
FEVER: 0
COUGH: 1
DIARRHEA: 0
RHINORRHEA: 0

## 2023-07-31 NOTE — PROGRESS NOTES
Subjective   Patient ID: Facundo Thomas is a 23 y.o. male who presents for Sore Throat.    Pt was seen on 7/25/23 with a sore throat. Strep and COVID came back negative. Patient says that the day after he was seen, he had diarrhea, but that went away after a day. Pt had a headache yesterday (but it is gone now). He has a cough now. Tried OTC cough and cold medicine and it did not help.          Review of Systems   Constitutional:  Positive for fatigue. Negative for chills and fever.   HENT:  Positive for sore throat. Negative for congestion, postnasal drip and rhinorrhea.    Respiratory:  Positive for cough. Negative for shortness of breath and wheezing.    Cardiovascular:  Negative for chest pain.   Gastrointestinal:  Negative for abdominal pain, diarrhea, nausea and vomiting.   Musculoskeletal:  Negative for myalgias.   Neurological:  Positive for headaches.       Objective   There were no vitals taken for this visit.    Physical Exam  Vitals reviewed.   Constitutional:       General: He is not in acute distress.     Appearance: Normal appearance. He is not ill-appearing or toxic-appearing.   HENT:      Head: Atraumatic.      Right Ear: Tympanic membrane, ear canal and external ear normal.      Left Ear: Tympanic membrane, ear canal and external ear normal.      Nose: Nose normal. No congestion or rhinorrhea.      Mouth/Throat:      Mouth: Mucous membranes are moist.      Pharynx: Oropharynx is clear. Posterior oropharyngeal erythema present. No oropharyngeal exudate.      Comments: Tonsils normal, uvula midline  Eyes:      Conjunctiva/sclera: Conjunctivae normal.   Cardiovascular:      Rate and Rhythm: Normal rate and regular rhythm.      Heart sounds: Normal heart sounds. No murmur heard.  Pulmonary:      Effort: Pulmonary effort is normal.      Breath sounds: Normal breath sounds. No wheezing or rhonchi.   Abdominal:      General: Bowel sounds are normal.      Palpations: Abdomen is soft.   Lymphadenopathy:       Cervical: Cervical adenopathy (shotty) present.   Skin:     General: Skin is warm and dry.   Neurological:      General: No focal deficit present.      Mental Status: He is alert.   Psychiatric:         Mood and Affect: Mood normal.         Behavior: Behavior normal.     Assessment/Plan   Problem List Items Addressed This Visit    None  Visit Diagnoses       Sore throat    -  Primary    Relevant Orders    Group A Streptococcus, PCR    POCT rapid strep A manually resulted    Sars-CoV-2 PCR, Symptomatic        Rapid strep is negative. Will send back up strep culture. Will also do COVID testing per pt's request. If COVID test is negative, pt to start azithromycin. Pt to hold zofran while on the zpack; he agreed. Advised patient on use of humidifier and hot steam treatments. Discussed that patient is to drink plenty of fluids and stay well hydrated. Can take tylenol as needed for any fevers or discomfort. Patient can use mucinex and flonase as well. Discussed that patient is to go to the ER for any chest pain, difficulty breathing, shortness of breath or new/concerning symptoms; he agreed. He is to self quarantine until covid test results become available. Pt to follow up in 2-3 days if no better despite the use of the medications.

## 2023-08-01 LAB
GROUP A STREP, PCR: NOT DETECTED
SARS-COV-2 RESULT: NOT DETECTED

## 2023-08-30 ENCOUNTER — TELEPHONE (OUTPATIENT)
Dept: PRIMARY CARE | Facility: CLINIC | Age: 23
End: 2023-08-30

## 2023-08-30 ENCOUNTER — TELEMEDICINE (OUTPATIENT)
Dept: PRIMARY CARE | Facility: CLINIC | Age: 23
End: 2023-08-30
Payer: COMMERCIAL

## 2023-08-30 DIAGNOSIS — H10.11 ALLERGIC CONJUNCTIVITIS OF RIGHT EYE: Primary | ICD-10-CM

## 2023-08-30 DIAGNOSIS — Z91.018 NUT ALLERGY: ICD-10-CM

## 2023-08-30 DIAGNOSIS — J30.9 ALLERGIC RHINITIS, UNSPECIFIED SEASONALITY, UNSPECIFIED TRIGGER: ICD-10-CM

## 2023-08-30 DIAGNOSIS — J45.991 COUGH VARIANT ASTHMA (HHS-HCC): ICD-10-CM

## 2023-08-30 PROCEDURE — 99213 OFFICE O/P EST LOW 20 MIN: CPT | Performed by: FAMILY MEDICINE

## 2023-08-30 RX ORDER — ALBUTEROL SULFATE 90 UG/1
2 AEROSOL, METERED RESPIRATORY (INHALATION) EVERY 4 HOURS PRN
Qty: 18 G | Refills: 1 | Status: SHIPPED | OUTPATIENT
Start: 2023-08-30 | End: 2024-03-22 | Stop reason: SDUPTHER

## 2023-08-30 RX ORDER — CETIRIZINE HYDROCHLORIDE 10 MG/1
10 TABLET ORAL DAILY PRN
Qty: 90 TABLET | Refills: 1 | Status: SHIPPED | OUTPATIENT
Start: 2023-08-30 | End: 2024-04-23

## 2023-08-30 RX ORDER — EPINEPHRINE 0.3 MG/.3ML
0.3 INJECTION SUBCUTANEOUS
COMMUNITY
Start: 2019-03-07 | End: 2023-08-30 | Stop reason: SDUPTHER

## 2023-08-30 RX ORDER — ALBUTEROL SULFATE 90 UG/1
2 AEROSOL, METERED RESPIRATORY (INHALATION) EVERY 4 HOURS PRN
COMMUNITY
Start: 2019-03-07 | End: 2023-08-30 | Stop reason: SDUPTHER

## 2023-08-30 RX ORDER — EPINEPHRINE 0.3 MG/.3ML
0.3 INJECTION SUBCUTANEOUS ONCE
Qty: 0.3 ML | Refills: 0 | Status: SHIPPED | OUTPATIENT
Start: 2023-08-30 | End: 2024-03-22 | Stop reason: SDUPTHER

## 2023-08-30 RX ORDER — CETIRIZINE HYDROCHLORIDE 10 MG/1
10 TABLET ORAL DAILY PRN
COMMUNITY
Start: 2019-07-31 | End: 2023-08-30 | Stop reason: SDUPTHER

## 2023-08-30 ASSESSMENT — ENCOUNTER SYMPTOMS: EYE ITCHING: 1

## 2023-08-30 NOTE — TELEPHONE ENCOUNTER
Pt calling in regards to wanting to know if you are able to do a virtual visit with him today due to his eye being red and swollen.    Please Advise

## 2023-08-30 NOTE — PROGRESS NOTES
Subjective   Patient ID: Facundo Thomas is a 23 y.o. male who presents for Eye Problem (VV - Audio only ).    Eye Problem   The left eye is affected. The current episode started today. The problem has been rapidly improving. There was no injury mechanism. The pain is at a severity of 5/10. The pain is moderate. He Does not wear contacts. Associated symptoms include itching. He has tried nothing for the symptoms. The treatment provided no relief.      He has had left eye itching, redness and swelling present since this morning.  The eye has been watering.  No right eye symptoms.  No purulent discharge or crusting.  He has had sneezing but no fever, ear pain or nasal congestion  Eye symptoms have been improving since they started this morning  Also needs refills today.  He previously saw an allergist and was diagnosed with hazelnut allergy.  It was recommended that he have an EpiPen and he needs a refill on this.  He uses albuterol as needed for asthma.  Uses less than once every 1 to 2 weeks.  He also uses cetirizine as needed for allergies which works well      Review of Systems   Eyes:  Positive for itching.       Objective   There were no vitals taken for this visit.    Physical Exam today's visit was telephone only so I was not able to examine his eye    Assessment/Plan   Problem List Items Addressed This Visit          Medium    Allergic rhinitis    Relevant Medications    cetirizine (ZyrTEC) 10 mg tablet    Cough variant asthma    Relevant Medications    albuterol 90 mcg/actuation inhaler     Other Visit Diagnoses       Allergic conjunctivitis of right eye    -  Primary    Nut allergy        Relevant Medications    EPINEPHrine 0.3 mg/0.3 mL injection syringe          He presents today with unilateral eye itching redness and swelling, which started this morning and has been improving.  Today's visit was done over the telephone so I was not able to examine his eye, however based on his description I suspect that  this is allergic conjunctivitis.  Viral or bacterial conjunctivitis would be less likely given that it has been improving.  Recommended taking cetirizine and he will notify us in the next several days if not improving  Asthma has been adequately controlled on albuterol as needed.  Given refill today  I also gave him a refill on EpiPen for hazelnut allergy

## 2023-10-15 ENCOUNTER — HOSPITAL ENCOUNTER (EMERGENCY)
Facility: HOSPITAL | Age: 23
Discharge: HOME | End: 2023-10-15
Attending: STUDENT IN AN ORGANIZED HEALTH CARE EDUCATION/TRAINING PROGRAM

## 2023-10-15 VITALS
TEMPERATURE: 97.9 F | DIASTOLIC BLOOD PRESSURE: 76 MMHG | HEIGHT: 67 IN | HEART RATE: 86 BPM | BODY MASS INDEX: 28.25 KG/M2 | RESPIRATION RATE: 16 BRPM | WEIGHT: 180 LBS | SYSTOLIC BLOOD PRESSURE: 129 MMHG | OXYGEN SATURATION: 98 %

## 2023-10-15 DIAGNOSIS — R05.2 SUBACUTE COUGH: ICD-10-CM

## 2023-10-15 DIAGNOSIS — R11.11 VOMITING WITHOUT NAUSEA, UNSPECIFIED VOMITING TYPE: Primary | ICD-10-CM

## 2023-10-15 LAB
FLUAV RNA RESP QL NAA+PROBE: NOT DETECTED
FLUBV RNA RESP QL NAA+PROBE: NOT DETECTED
RSV RNA RESP QL NAA+PROBE: NOT DETECTED
SARS-COV-2 RNA RESP QL NAA+PROBE: NOT DETECTED

## 2023-10-15 PROCEDURE — 87637 SARSCOV2&INF A&B&RSV AMP PRB: CPT

## 2023-10-15 PROCEDURE — 99283 EMERGENCY DEPT VISIT LOW MDM: CPT

## 2023-10-15 PROCEDURE — 99284 EMERGENCY DEPT VISIT MOD MDM: CPT | Performed by: STUDENT IN AN ORGANIZED HEALTH CARE EDUCATION/TRAINING PROGRAM

## 2023-10-15 PROCEDURE — 2500000001 HC RX 250 WO HCPCS SELF ADMINISTERED DRUGS (ALT 637 FOR MEDICARE OP)

## 2023-10-15 RX ORDER — BENZONATATE 100 MG/1
100 CAPSULE ORAL ONCE
Status: COMPLETED | OUTPATIENT
Start: 2023-10-15 | End: 2023-10-15

## 2023-10-15 RX ORDER — ONDANSETRON 4 MG/1
4 TABLET, ORALLY DISINTEGRATING ORAL ONCE
Status: DISCONTINUED | OUTPATIENT
Start: 2023-10-15 | End: 2023-10-16 | Stop reason: HOSPADM

## 2023-10-15 RX ORDER — BENZONATATE 100 MG/1
100 CAPSULE ORAL EVERY 8 HOURS
Qty: 21 CAPSULE | Refills: 0 | Status: SHIPPED | OUTPATIENT
Start: 2023-10-15 | End: 2023-10-22

## 2023-10-15 RX ADMIN — BENZONATATE 100 MG: 100 CAPSULE ORAL at 21:28

## 2023-10-15 ASSESSMENT — PAIN SCALES - GENERAL: PAINLEVEL_OUTOF10: 0 - NO PAIN

## 2023-10-15 ASSESSMENT — LIFESTYLE VARIABLES
EVER HAD A DRINK FIRST THING IN THE MORNING TO STEADY YOUR NERVES TO GET RID OF A HANGOVER: NO
EVER FELT BAD OR GUILTY ABOUT YOUR DRINKING: NO
HAVE YOU EVER FELT YOU SHOULD CUT DOWN ON YOUR DRINKING: NO
HAVE PEOPLE ANNOYED YOU BY CRITICIZING YOUR DRINKING: NO
REASON UNABLE TO ASSESS: NO

## 2023-10-15 ASSESSMENT — COLUMBIA-SUICIDE SEVERITY RATING SCALE - C-SSRS
6. HAVE YOU EVER DONE ANYTHING, STARTED TO DO ANYTHING, OR PREPARED TO DO ANYTHING TO END YOUR LIFE?: NO
2. HAVE YOU ACTUALLY HAD ANY THOUGHTS OF KILLING YOURSELF?: NO
1. IN THE PAST MONTH, HAVE YOU WISHED YOU WERE DEAD OR WISHED YOU COULD GO TO SLEEP AND NOT WAKE UP?: NO

## 2023-10-15 ASSESSMENT — PAIN - FUNCTIONAL ASSESSMENT: PAIN_FUNCTIONAL_ASSESSMENT: 0-10

## 2023-10-16 NOTE — ED PROVIDER NOTES
HPI   Chief Complaint   Patient presents with    Vomiting     Pt c/o cough and bloody vomit (bright red). Pt endorses headache and weakness. States this has been going on for the past 3 weeks.        23-year-old male with a history of asthma, ADHD, eczema, bipolar disorder not on medications, presenting to [facility] ED with complaint of hematemesis.  He reports that he has been coughing for the past 3 weeks and it has been worsening then today approximately 1 hour prior to arrival he had 2 bouts of emesis with streaks of blood.  States he has been feeling weak and fatigued as well.  Denies nausea, palpitations, shortness of breath, or presyncope.      History provided by:  Patient                      [place] Coma Scale Score: 15                  Patient History   Past Medical History:   Diagnosis Date    Cough, unspecified 12/09/2013    Cough    Personal history of other diseases of the respiratory system 03/09/2016    History of asthma     Past Surgical History:   Procedure Laterality Date    CIRCUMCISION, PRIMARY  10/10/2013    Elective Circumcision    OTHER SURGICAL HISTORY  12/10/2019    Oral surgery     No family history on file.  Social History     Tobacco Use    Smoking status: Former     Packs/day: .25     Types: Cigarettes    Smokeless tobacco: Current   Substance Use Topics    Alcohol use: Not Currently    Drug use: Never       Physical Exam   ED Triage Vitals [10/15/23 2034]   Temp Heart Rate Resp BP   36.6 °C (97.9 °F) 90 18 145/87      SpO2 Temp src Heart Rate Source Patient Position   97 % -- -- --      BP Location FiO2 (%)     -- --       Physical Exam  Vitals and nursing note reviewed.   Constitutional:       General: He is not in acute distress.     Appearance: He is well-developed.   HENT:      Head: Normocephalic and atraumatic.      Right Ear: External ear normal.      Left Ear: External ear normal.      Nose: Nose normal. No congestion or rhinorrhea.      Mouth/Throat:      Mouth: Mucous  membranes are moist.      Pharynx: No oropharyngeal exudate or posterior oropharyngeal erythema.   Eyes:      General:         Right eye: No discharge.         Left eye: No discharge.      Extraocular Movements: Extraocular movements intact.      Conjunctiva/sclera: Conjunctivae normal.   Cardiovascular:      Rate and Rhythm: Normal rate and regular rhythm.      Pulses: Normal pulses.      Heart sounds: No murmur heard.     No friction rub.   Pulmonary:      Effort: Pulmonary effort is normal. No respiratory distress.      Breath sounds: Normal breath sounds. No stridor. No wheezing or rales.   Abdominal:      General: There is no distension.      Palpations: Abdomen is soft.      Tenderness: There is no abdominal tenderness. There is no right CVA tenderness, left CVA tenderness or guarding.   Musculoskeletal:         General: No swelling, tenderness, deformity or signs of injury. Normal range of motion.      Cervical back: Neck supple.      Right lower leg: No edema.      Left lower leg: No edema.   Skin:     General: Skin is warm and dry.      Capillary Refill: Capillary refill takes less than 2 seconds.      Coloration: Skin is not jaundiced or pale.      Findings: No lesion or rash.   Neurological:      General: No focal deficit present.      Mental Status: He is alert. Mental status is at baseline.      Cranial Nerves: No cranial nerve deficit.      Sensory: No sensory deficit.      Motor: No weakness.   Psychiatric:         Mood and Affect: Mood normal.         Behavior: Behavior normal.         Thought Content: Thought content normal.         Judgment: Judgment normal.         ED Course & MDM   ED Course as of 10/15/23 2219   [place] Oct 15, 2023   [address]37 23-year-old male presents to ED for generalized nausea, vomiting, coughs.  States he has had 1 episode of blood-streaked vomit along with blood-tinged his cough.  Admits to sick contacts in the household, mother, sister experiencing cough, nausea as well.  Patient  adamantly denies abdominal pain.  Upon arrival patient's vitals within normal limits abdomen soft nontender, negative Rovsing sign negative McBurney point tenderness, negative psoas sign negative obturator sign.  Risk benefits of CT discussed with patient at length.  Patient alert vented x3, demonstrates capacity, reiterates risk benefits to physician, states he would not like CT abdomen pelvis at this time due to risk of radiation.  Patient to receive viral swabs, symptomatic control, p.o. challenge [AS]      ED Course User Index  [AS] Dayday DO Jeremiah         Diagnoses as of 10/15/23 2219   Vomiting without nausea, unspecified vomiting type   Subacute cough       Medical Decision Making  23-year-old male with a history manage above presenting to the ED with complaint of 3 weeks of coughing and 1 episode of emesis with streaked blood.    Patient is afebrile, hemodynamically stable on room air, and in no acute distress.  Physical exam findings mentioned above.  COVID, flu, and RSV panels ordered.  Tessalon and Zofran provided.  Patient's history of coughing with single bout of vomiting and streaks of blood suggestive of esophageal irritation.  Patient appears well and has not vomited or coughed since in the ED.  Patient is not complaining of pain.  Lung sounds clear and abdomen soft and nontender.  We do not suspect an esophageal cancer, acute anemia, or pneumonia at this time based on patient's age, vitals, and lung sounds.    COVID, flu, and RSV root panels returned negative.    Disposition: Discussed with patient who agreed with discharge.  He will utilize Tessalon Perles as needed for coughing.        Procedure  Procedures     Yury Esteban MD  Resident  10/15/23 4972

## 2023-10-16 NOTE — DISCHARGE INSTRUCTIONS
Seek immediate medical attention for:  Shortness of breath, lightheadedness, confusion, fevers, feeling like you are going to pass out, complete red vomitus, or any worsening or new concerning symptoms.

## 2024-01-03 ENCOUNTER — TELEPHONE (OUTPATIENT)
Dept: PRIMARY CARE | Facility: CLINIC | Age: 24
End: 2024-01-03

## 2024-01-03 NOTE — TELEPHONE ENCOUNTER
Pt called; wants to schedule an appt to discuss bipolar meds/treatment. Pt wants to know if it can be a virtual appt. Please advise.

## 2024-01-09 ENCOUNTER — APPOINTMENT (OUTPATIENT)
Dept: RHEUMATOLOGY | Facility: CLINIC | Age: 24
End: 2024-01-09

## 2024-02-07 ENCOUNTER — APPOINTMENT (OUTPATIENT)
Dept: RADIOLOGY | Facility: HOSPITAL | Age: 24
End: 2024-02-07
Payer: MEDICAID

## 2024-02-07 ENCOUNTER — HOSPITAL ENCOUNTER (EMERGENCY)
Facility: HOSPITAL | Age: 24
Discharge: HOME | End: 2024-02-07
Attending: EMERGENCY MEDICINE
Payer: MEDICAID

## 2024-02-07 VITALS
RESPIRATION RATE: 16 BRPM | WEIGHT: 170 LBS | TEMPERATURE: 97.5 F | SYSTOLIC BLOOD PRESSURE: 126 MMHG | OXYGEN SATURATION: 97 % | DIASTOLIC BLOOD PRESSURE: 70 MMHG | HEART RATE: 80 BPM | HEIGHT: 67 IN | BODY MASS INDEX: 26.68 KG/M2

## 2024-02-07 DIAGNOSIS — R31.9 HEMATURIA, UNSPECIFIED TYPE: Primary | ICD-10-CM

## 2024-02-07 DIAGNOSIS — R19.7 DIARRHEA, UNSPECIFIED TYPE: ICD-10-CM

## 2024-02-07 LAB
ANION GAP SERPL CALC-SCNC: 12 MMOL/L (ref 10–20)
APPEARANCE UR: CLEAR
BASOPHILS # BLD AUTO: 0.02 X10*3/UL (ref 0–0.1)
BASOPHILS NFR BLD AUTO: 0.3 %
BILIRUB UR STRIP.AUTO-MCNC: NEGATIVE MG/DL
BUN SERPL-MCNC: 15 MG/DL (ref 6–23)
C COLI+JEJ+UPSA DNA STL QL NAA+PROBE: NOT DETECTED
CALCIUM SERPL-MCNC: 9.9 MG/DL (ref 8.6–10.3)
CHLORIDE SERPL-SCNC: 102 MMOL/L (ref 98–107)
CO2 SERPL-SCNC: 28 MMOL/L (ref 21–32)
COLOR UR: YELLOW
CREAT SERPL-MCNC: 0.96 MG/DL (ref 0.5–1.3)
EC STX1 GENE STL QL NAA+PROBE: NOT DETECTED
EC STX2 GENE STL QL NAA+PROBE: NOT DETECTED
EGFRCR SERPLBLD CKD-EPI 2021: >90 ML/MIN/1.73M*2
EOSINOPHIL # BLD AUTO: 0.18 X10*3/UL (ref 0–0.7)
EOSINOPHIL NFR BLD AUTO: 2.4 %
ERYTHROCYTE [DISTWIDTH] IN BLOOD BY AUTOMATED COUNT: 13.1 % (ref 11.5–14.5)
FLUAV RNA RESP QL NAA+PROBE: NOT DETECTED
FLUBV RNA RESP QL NAA+PROBE: NOT DETECTED
GLUCOSE SERPL-MCNC: 91 MG/DL (ref 74–99)
GLUCOSE UR STRIP.AUTO-MCNC: NEGATIVE MG/DL
HCT VFR BLD AUTO: 46.4 % (ref 41–52)
HGB BLD-MCNC: 15.6 G/DL (ref 13.5–17.5)
HOLD SPECIMEN: NORMAL
IMM GRANULOCYTES # BLD AUTO: 0.04 X10*3/UL (ref 0–0.7)
IMM GRANULOCYTES NFR BLD AUTO: 0.5 % (ref 0–0.9)
KETONES UR STRIP.AUTO-MCNC: ABNORMAL MG/DL
LEUKOCYTE ESTERASE UR QL STRIP.AUTO: NEGATIVE
LYMPHOCYTES # BLD AUTO: 1.47 X10*3/UL (ref 1.2–4.8)
LYMPHOCYTES NFR BLD AUTO: 19.6 %
MCH RBC QN AUTO: 26.8 PG (ref 26–34)
MCHC RBC AUTO-ENTMCNC: 33.6 G/DL (ref 32–36)
MCV RBC AUTO: 80 FL (ref 80–100)
MONOCYTES # BLD AUTO: 0.54 X10*3/UL (ref 0.1–1)
MONOCYTES NFR BLD AUTO: 7.2 %
MUCOUS THREADS #/AREA URNS AUTO: ABNORMAL /LPF
NEUTROPHILS # BLD AUTO: 5.25 X10*3/UL (ref 1.2–7.7)
NEUTROPHILS NFR BLD AUTO: 70 %
NITRITE UR QL STRIP.AUTO: NEGATIVE
NOROVIRUS GI + GII RNA STL NAA+PROBE: NOT DETECTED
NRBC BLD-RTO: 0 /100 WBCS (ref 0–0)
PH UR STRIP.AUTO: 6 [PH]
PLATELET # BLD AUTO: 224 X10*3/UL (ref 150–450)
POTASSIUM SERPL-SCNC: 4 MMOL/L (ref 3.5–5.3)
PROT UR STRIP.AUTO-MCNC: ABNORMAL MG/DL
RBC # BLD AUTO: 5.82 X10*6/UL (ref 4.5–5.9)
RBC # UR STRIP.AUTO: NEGATIVE /UL
RBC #/AREA URNS AUTO: ABNORMAL /HPF
RSV RNA RESP QL NAA+PROBE: NOT DETECTED
RV RNA STL NAA+PROBE: NOT DETECTED
SALMONELLA DNA STL QL NAA+PROBE: NOT DETECTED
SARS-COV-2 RNA RESP QL NAA+PROBE: NOT DETECTED
SHIGELLA DNA SPEC QL NAA+PROBE: NOT DETECTED
SODIUM SERPL-SCNC: 138 MMOL/L (ref 136–145)
SP GR UR STRIP.AUTO: 1.03
UROBILINOGEN UR STRIP.AUTO-MCNC: 2 MG/DL
V CHOLERAE DNA STL QL NAA+PROBE: NOT DETECTED
WBC # BLD AUTO: 7.5 X10*3/UL (ref 4.4–11.3)
WBC #/AREA URNS AUTO: ABNORMAL /HPF
Y ENTEROCOL DNA STL QL NAA+PROBE: NOT DETECTED

## 2024-02-07 PROCEDURE — 81001 URINALYSIS AUTO W/SCOPE: CPT | Performed by: EMERGENCY MEDICINE

## 2024-02-07 PROCEDURE — 74176 CT ABD & PELVIS W/O CONTRAST: CPT | Mod: FR

## 2024-02-07 PROCEDURE — 85025 COMPLETE CBC W/AUTO DIFF WBC: CPT | Performed by: EMERGENCY MEDICINE

## 2024-02-07 PROCEDURE — 87506 IADNA-DNA/RNA PROBE TQ 6-11: CPT | Mod: ELYLAB | Performed by: EMERGENCY MEDICINE

## 2024-02-07 PROCEDURE — 87637 SARSCOV2&INF A&B&RSV AMP PRB: CPT | Performed by: EMERGENCY MEDICINE

## 2024-02-07 PROCEDURE — 36415 COLL VENOUS BLD VENIPUNCTURE: CPT | Performed by: EMERGENCY MEDICINE

## 2024-02-07 PROCEDURE — 74176 CT ABD & PELVIS W/O CONTRAST: CPT | Mod: FOREIGN READ | Performed by: RADIOLOGY

## 2024-02-07 PROCEDURE — 99284 EMERGENCY DEPT VISIT MOD MDM: CPT | Mod: 25 | Performed by: EMERGENCY MEDICINE

## 2024-02-07 PROCEDURE — 80048 BASIC METABOLIC PNL TOTAL CA: CPT | Performed by: EMERGENCY MEDICINE

## 2024-02-07 ASSESSMENT — COLUMBIA-SUICIDE SEVERITY RATING SCALE - C-SSRS
1. IN THE PAST MONTH, HAVE YOU WISHED YOU WERE DEAD OR WISHED YOU COULD GO TO SLEEP AND NOT WAKE UP?: NO
2. HAVE YOU ACTUALLY HAD ANY THOUGHTS OF KILLING YOURSELF?: NO
6. HAVE YOU EVER DONE ANYTHING, STARTED TO DO ANYTHING, OR PREPARED TO DO ANYTHING TO END YOUR LIFE?: NO

## 2024-02-07 ASSESSMENT — PAIN - FUNCTIONAL ASSESSMENT
PAIN_FUNCTIONAL_ASSESSMENT: 0-10
PAIN_FUNCTIONAL_ASSESSMENT: 0-10

## 2024-02-07 ASSESSMENT — LIFESTYLE VARIABLES
EVER FELT BAD OR GUILTY ABOUT YOUR DRINKING: NO
HAVE YOU EVER FELT YOU SHOULD CUT DOWN ON YOUR DRINKING: NO
HAVE PEOPLE ANNOYED YOU BY CRITICIZING YOUR DRINKING: NO
EVER HAD A DRINK FIRST THING IN THE MORNING TO STEADY YOUR NERVES TO GET RID OF A HANGOVER: NO

## 2024-02-07 ASSESSMENT — PAIN DESCRIPTION - DESCRIPTORS: DESCRIPTORS: ACHING

## 2024-02-07 ASSESSMENT — PAIN SCALES - GENERAL
PAINLEVEL_OUTOF10: 0 - NO PAIN
PAINLEVEL_OUTOF10: 3

## 2024-02-07 NOTE — ED PROVIDER NOTES
HPI   Chief Complaint   Patient presents with    Flu Symptoms     Pt presents to the Ed with FLU x 1 week. States that he has had diarrhea since Thursday of last week and body aches a couple days ago. Pt states that he is able to eat and drink.       HPI: 23-year-old male presents stating that he has been having diarrhea for few days.  He states that he had some nausea vomiting earlier that is improved but he still has diarrhea.  He states the diarrhea is nonbloody nonbilious.  He states that no fevers.  He states he has some bodyaches and chills.  No headache or vision changes.  No chest pain.  No shortness of breath.  No testicular pain.  No dysuria.  He is on any blood thinners.  He states that he was exposed to COVID.    Family HX: Denies any significant/pertinent family history.  Social Hx: Denies ETOH or drug use.  Review of systems:  Gen.: No weight loss, fatigue, anorexia, insomnia, fever.   Eyes: No vision loss, double vision, drainage, eye pain.   ENT: No pharyngitis, dry mouth.   Cardiac: No chest pain, palpitations, syncope, near syncope.   Pulmonary: No shortness of breath, cough, hemoptysis.   Heme/lymph: No swollen glands, fever, bleeding.   GI: No abdominal pain, change in bowel habits, melena, hematemesis, hematochezia  : No discharge, dysuria, frequency, urgency, hematuria.   Musculoskeletal: No limb pain, joint pain, joint swelling.   Skin: No rashes.   Psych: No depression, anxiety, suicidality, homicidality.   Review of systems is otherwise negative unless stated above or in history of present illness.    Physical Exam:    Appearance: Alert, oriented , cooperative,  in no acute distress. Well nourished & well hydrated.    Skin: Intact,  dry skin, no lesions, rash, petechiae or purpura.     Eyes: PERRLA, EOMs intact,  Conjunctiva pink with no redness or exudates. Eyelids without lesions. No scleral icterus.     ENT: Hearing grossly intact. External auditory canals patent, tympanic membranes  intact with visible landmarks. Nares patent, mucus membranes moist. Dentition without lesions. Pharynx clear, uvula midline.     Neck: Supple, without meningismus. Thyroid not palpable. Trachea at midline. No lymphadenopathy.    Pulmonary: Clear bilaterally with good chest wall excursion. No rales, rhonchi or wheezing. No accessory muscle use or stridor.    Cardiac: Normal S1, S2 without murmur, rub, gallop or extrasystole. No JVD, Carotids without bruits.    Abdomen: Soft, nontender, active bowel sounds.  No palpable organomegaly.  No rebound or guarding.  No CVA tenderness.    Genitourinary: Exam deferred.    Musculoskeletal: Full range of motion. no pain, edema, or deformity. Pulses full and equal. No cyanosis, clubbing, or edema.    Neurological:  Cranial nerves II through XII are grossly intact, finger-nose touch is normal, normal sensation, no weakness, no focal findings identified.    Psychiatric: Appropriate mood and affect.     Medical Decision-Making:  Testing: Patient was negative for COVID and flu.  Patient is well-appearing.  Does not appear septic or toxic.  He appears well-hydrated.  His abdomen is benign.  Urinalysis did show RBCs.  Therefore did do a workup which was essentially unremarkable.  Had normal labs and a normal CT.  He was instructed on supportive care.  He is tolerating p.o. here in the emergency department.  He was given a note for work.  He will be given a referral to urology for the microscopic hematuria.  No testicular pain.  Return for worsening symptoms, fevers vomiting decreased urine output or any other concerns.  Treatment:   Reevaluation:   Plan: Homegoing. Discussed differential. Will follow-up with the primary physician in the next 2-3 days. Return if worse. They understand return precautions and discharge instructions. Patient and family/friend/caregiver are in agreement with this plan.   Impression:   1.  Hematuria  2.  Diarrhea    Labs Reviewed  URINALYSIS WITH REFLEX  CULTURE AND MICROSCOPIC - Abnormal     Color, Urine                  Yellow                 Appearance, Urine             Clear                  Specific Gravity, Urine       1.028                  pH, Urine                     6.0                    Protein, Urine                30 (1+) (*)               Glucose, Urine                NEGATIVE                Blood, Urine                  NEGATIVE                Ketones, Urine                5 (TRACE) (*)               Bilirubin, Urine              NEGATIVE                Urobilinogen, Urine           2.0 (*)                Nitrite, Urine                NEGATIVE                Leukocyte Esterase, Urine     NEGATIVE             URINALYSIS MICROSCOPIC WITH REFLEX CULTURE - Abnormal     WBC, Urine                    NONE                   RBC, Urine                    11-20 (*)               Mucus, Urine                  1+                  SARS-COV-2 PCR - Normal     Coronavirus 2019, PCR                                  Narrative: This assay has received FDA Emergency Use Authorization (EUA) and is only authorized for the duration of time that circumstances exist to justify the authorization of the emergency use of in vitro diagnostic tests for the detection of SARS-CoV-2 virus and/or diagnosis of COVID-19 infection under section 564(b)(1) of the Act, 21 U.S.C. 360bbb-3(b)(1). This assay is an in vitro diagnostic nucleic acid amplification test for the qualitative detection of SARS-CoV-2 from nasopharyngeal specimens and has been validated for use at [facility]. Negative results do not preclude COVID-19 infections and should not be used as the sole basis for diagnosis, treatment, or other management decisions.                  INFLUENZA A AND B PCR - Normal     Flu A Result                                         Flu B Result                                           Narrative: This assay is an in vitro diagnostic multiplex nucleic acid  amplification test for the detection and discrimination of Influenza A & B from nasopharyngeal specimens, and has been validated for use at [facility]. Negative results do not preclude Influenza A/B infections, and should not be used as the sole basis for diagnosis, treatment, or other management decisions. If Influenza A/B and RSV PCR results are negative, testing for Parainfluenza virus, Adenovirus and Metapneumovirus is routinely performed for [facility] pediatric oncology and intensive care inpatients, and is available on other patients by placing an add-on request.  RSV PCR - Normal     RSV PCR                                                Narrative: This assay is an FDA-cleared, in vitro diagnostic nucleic acid amplification test for the detection of RSV from nasopharyngeal specimens, and has been validated for use at [facility]. Negative results do not preclude RSV infections, and should not be used as the sole basis for diagnosis, treatment, or other management decisions. If Influenza A/B and RSV PCR results are negative, testing for Parainfluenza virus, Adenovirus and Metapneumovirus is routinely performed for pediatric oncology and intensive care inpatients at [facility], and is available on other patients by placing an add-on request.                                  BASIC METABOLIC PANEL - Normal     Glucose                       91                     Sodium                        138                    Potassium                     4.0                    Chloride                      102                    Bicarbonate                   28                     Anion Gap                     12                     Urea Nitrogen                 15                     Creatinine                    0.96                   eGFR                          >90                    Calcium                       9.9                 STOOL PATHOGEN PANEL, PCR  CBC WITH AUTO  DIFFERENTIAL     WBC                           7.5                    nRBC                          0.0                    RBC                           5.82                   Hemoglobin                    15.6                   Hematocrit                    46.4                   MCV                           80                     MCH                           26.8                   MCHC                          33.6                   RDW                           13.1                   Platelets                     224                    Neutrophils %                 70.0                   Immature Granulocytes %, Automated   0.5                    Lymphocytes %                 19.6                   Monocytes %                   7.2                    Eosinophils %                 2.4                    Basophils %                   0.3                    Neutrophils Absolute          5.25                   Immature Granulocytes Absolute, Au*   0.04                   Lymphocytes Absolute          1.47                   Monocytes Absolute            0.54                   Eosinophils Absolute          0.18                   Basophils Absolute            0.02                URINALYSIS WITH REFLEX CULTURE AND MICROSCOPIC       Narrative: The following orders were created for panel order Urinalysis with Reflex Culture and Microscopic.                Procedure                               Abnormality         Status                                   ---------                               -----------         ------                                   Urinalysis with Reflex C...[[id number]]  Abnormal            Final result                             Extra Urine Gray Tube[[id number]]                            In process                                               Please view results for these tests on the individual orders.  EXTRA URINE GRAY TUBE     CT abdomen pelvis wo IV contrast   Final Result    1. No acute  process and no CT findings to account for the patient's    reported hematuria.    2. Minimal clustered groundglass nodules in the medial left lower lobe    suggestive of a small area bronchiolitis.    Signed by Naeem Coreas MD                                  [place] Coma Scale Score: 15                     Patient History   Past Medical History:   Diagnosis Date    Cough, unspecified 12/09/2013    Cough    Personal history of other diseases of the respiratory system 03/09/2016    History of asthma     Past Surgical History:   Procedure Laterality Date    CIRCUMCISION, PRIMARY  10/10/2013    Elective Circumcision    OTHER SURGICAL HISTORY  12/10/2019    Oral surgery     No family history on file.  Social History     Tobacco Use    Smoking status: Former     Packs/day: .25     Types: Cigarettes    Smokeless tobacco: Current   Substance Use Topics    Alcohol use: Not Currently    Drug use: Never       Physical Exam   ED Triage Vitals [02/07/24 0802]   Temperature Heart Rate Respirations BP   36.4 °C (97.5 °F) 83 16 129/73      Pulse Ox Temp Source Heart Rate Source Patient Position   96 % Temporal Monitor Sitting      BP Location FiO2 (%)     Right arm --       Physical Exam    ED Course & MDM   Diagnoses as of 02/07/24 1052   Hematuria, unspecified type   Diarrhea, unspecified type       Medical Decision Making      Procedure  Procedures     Bella Mclean MD  02/07/24 1052

## 2024-02-07 NOTE — Clinical Note
Facundo Thomas was seen and treated in our emergency department on 2/7/2024.  He may return to work on 02/12/2024.       If you have any questions or concerns, please don't hesitate to call.      Bella Mclean MD

## 2024-03-13 ENCOUNTER — TELEPHONE (OUTPATIENT)
Dept: PRIMARY CARE | Facility: CLINIC | Age: 24
End: 2024-03-13

## 2024-03-13 NOTE — LETTER
March 13, 2024     Patient: Facundo Thomas   YOB: 2000   Date of Visit: 3/13/2024       To Whom It May Concern:    Facundo Thomas was in with his son today in my clinic on 3/13/2024 at 8:20am?. Please excuse Facundo for his absence from work on this day to make the appointment.    If you have any questions or concerns, please don't hesitate to call.         Sincerely,             Дмитрий Bill, DO

## 2024-03-19 ENCOUNTER — APPOINTMENT (OUTPATIENT)
Dept: UROLOGY | Facility: CLINIC | Age: 24
End: 2024-03-19

## 2024-03-22 ENCOUNTER — TELEMEDICINE (OUTPATIENT)
Dept: PRIMARY CARE | Facility: CLINIC | Age: 24
End: 2024-03-22
Payer: MEDICAID

## 2024-03-22 DIAGNOSIS — J45.991 COUGH VARIANT ASTHMA (HHS-HCC): ICD-10-CM

## 2024-03-22 DIAGNOSIS — Z91.018 NUT ALLERGY: ICD-10-CM

## 2024-03-22 DIAGNOSIS — F31.10 BIPOLAR DISORDER, CURRENT EPISODE MANIC WITHOUT PSYCHOTIC FEATURES (MULTI): Primary | ICD-10-CM

## 2024-03-22 PROCEDURE — 99214 OFFICE O/P EST MOD 30 MIN: CPT | Performed by: FAMILY MEDICINE

## 2024-03-22 RX ORDER — EPINEPHRINE 0.3 MG/.3ML
0.3 INJECTION SUBCUTANEOUS ONCE
Qty: 0.3 ML | Refills: 0 | Status: SHIPPED | OUTPATIENT
Start: 2024-03-22 | End: 2024-04-23

## 2024-03-22 RX ORDER — ALBUTEROL SULFATE 90 UG/1
2 AEROSOL, METERED RESPIRATORY (INHALATION) EVERY 4 HOURS PRN
Qty: 18 G | Refills: 1 | Status: SHIPPED | OUTPATIENT
Start: 2024-03-22

## 2024-03-22 RX ORDER — ARIPIPRAZOLE 5 MG/1
5 TABLET ORAL DAILY
Qty: 30 TABLET | Refills: 3 | Status: SHIPPED | OUTPATIENT
Start: 2024-03-22 | End: 2024-04-23 | Stop reason: SDUPTHER

## 2024-03-22 ASSESSMENT — ENCOUNTER SYMPTOMS
WHEEZING: 0
NERVOUS/ANXIOUS: 1
COUGH: 0
SLEEP DISTURBANCE: 0

## 2024-03-22 NOTE — PROGRESS NOTES
Subjective   Patient ID: Facundo Thomas is a 23 y.o. male who presents for Bipolar (Been off bipolar medication about 3 years. Wants to disucss restarting. ).    Virtual or Telephone Consent    An interactive audio and video telecommunication system which permits real time communications between the patient (at the originating site) and provider (at the distant site) was utilized to provide this telehealth service.   Verbal consent was requested and obtained from Facundo Thomas on this date, 03/22/24 for a telehealth visit.     Here today to discuss bipolar disorder  He has a history of bipolar disorder and MDD and was previously following with psychiatry.  He is not currently taking any medications  Over the last several weeks he has noticed worsening symptoms.  He describes being more irritable and short tempered, and has been lashing out at times.  Also admits to worrying and overthinking things, as well as loss of interest and motivation.  He has not had any recent major manic episodes.  Sleep has been okay  He was previously treated with Abilify and took this for 1 month several years ago.  He believes that this medication may have helped, but also mentions that he was on another medication in the past which made him tired (cannot recall if this was Abilify)  Also needs refills  Has history of asthma currently on albuterol as needed.  This has been controlled and he uses albuterol less than 1 time per week  Hazelnut allergy: Has an EpiPen.  Needs refill    Review of Systems   Respiratory:  Negative for cough and wheezing.    Psychiatric/Behavioral:  Negative for sleep disturbance. The patient is nervous/anxious.        Objective   There were no vitals taken for this visit.    Physical Exam  Constitutional:       General: He is not in acute distress.     Appearance: Normal appearance. He is well-developed.   Pulmonary:      Effort: Pulmonary effort is normal.   Skin:     Findings: No rash.   Neurological:       Mental Status: He is alert.   Psychiatric:         Mood and Affect: Mood normal.         Behavior: Behavior normal.         Assessment/Plan   Problem List Items Addressed This Visit          Medium    Bipolar disorder, current episode manic without psychotic features (CMS/HCC) - Primary    Relevant Medications    ARIPiprazole (Abilify) 5 mg tablet    Cough variant asthma    Relevant Medications    albuterol 90 mcg/actuation inhaler     Other Visit Diagnoses       Nut allergy        Relevant Medications    EPINEPHrine 0.3 mg/0.3 mL injection syringe        No recent manic episodes, but main symptoms have been more irritability, as well as loss of interest and motivation, and some anxiety symptoms.  We will restart Abilify 5 mg daily and plan on following up in 1 month for recheck.  Consider referral to reestablish with psychiatry if symptoms do not improve.  Advised to call us if he gets any adverse effects with Abilify  2.  Asthma: This is adequately controlled on albuterol as needed.  Given refill today  Nut allergy: Given refill on EpiPen  Follow-up in 1 months for recheck

## 2024-04-23 ENCOUNTER — TELEPHONE (OUTPATIENT)
Dept: PRIMARY CARE | Facility: CLINIC | Age: 24
End: 2024-04-23

## 2024-04-23 ENCOUNTER — TELEMEDICINE (OUTPATIENT)
Dept: PRIMARY CARE | Facility: CLINIC | Age: 24
End: 2024-04-23
Payer: COMMERCIAL

## 2024-04-23 DIAGNOSIS — Z20.818 STREPTOCOCCUS EXPOSURE: ICD-10-CM

## 2024-04-23 DIAGNOSIS — F31.10 BIPOLAR DISORDER, CURRENT EPISODE MANIC WITHOUT PSYCHOTIC FEATURES (MULTI): ICD-10-CM

## 2024-04-23 DIAGNOSIS — F32.9 MAJOR DEPRESSIVE DISORDER, REMISSION STATUS UNSPECIFIED, UNSPECIFIED WHETHER RECURRENT: Primary | ICD-10-CM

## 2024-04-23 DIAGNOSIS — Z91.018 NUT ALLERGY: Primary | ICD-10-CM

## 2024-04-23 PROCEDURE — 99213 OFFICE O/P EST LOW 20 MIN: CPT | Performed by: FAMILY MEDICINE

## 2024-04-23 RX ORDER — AZITHROMYCIN 250 MG/1
TABLET, FILM COATED ORAL DAILY
Qty: 6 TABLET | Refills: 0 | Status: SHIPPED | OUTPATIENT
Start: 2024-04-23 | End: 2024-05-01 | Stop reason: ALTCHOICE

## 2024-04-23 RX ORDER — ARIPIPRAZOLE 10 MG/1
10 TABLET ORAL DAILY
Qty: 30 TABLET | Refills: 5 | Status: SHIPPED | OUTPATIENT
Start: 2024-04-23

## 2024-04-23 ASSESSMENT — ENCOUNTER SYMPTOMS
NERVOUS/ANXIOUS: 1
SLEEP DISTURBANCE: 0

## 2024-04-23 NOTE — TELEPHONE ENCOUNTER
Patient states he forgot to mention that he thinks he is coming down with something and since Jenn and Amyla were just in the hospital for strep, he is wondering if an antibiotic can be called in     He also would like to know if you could refer him for an allergy test to determine what medications he is allergic to.    Please Advise    [facility] [place] [address]    [phone]

## 2024-04-23 NOTE — PROGRESS NOTES
Subjective   Patient ID: Facundo Thomas is a 23 y.o. male who presents for Follow-up.    Virtual or Telephone Consent    An interactive audio and video telecommunication system which permits real time communications between the patient (at the originating site) and provider (at the distant site) was utilized to provide this telehealth service.   Verbal consent was requested and obtained from Facundo Thomas on this date, 04/23/24 for a telehealth visit.        He is here today for 1 month follow-up  He has a history of bipolar disorder and MDD and is currently taking aripiprazole 5 mg daily.  This medication was started at last visit 1 month ago.  At that time he was having symptoms including feeling more irritable and short tempered, worrying, overthinking things, and loss of interest and motivation.  He feels that the aripiprazole has been helping.  He is feeling less irritable and less short tempered, and is still having some anxiety symptoms, but feels that the symptoms have improved by approximately 80%.  He has been sleeping well.  No loss of energy or motivation.  No adverse effects with medication.  Denies any recent manic episodes.  He feels that the medication is helping, but would like to try to increase the dose        Review of Systems   Psychiatric/Behavioral:  Negative for sleep disturbance. The patient is nervous/anxious.        Objective   There were no vitals taken for this visit.    Physical Exam  Constitutional:       General: He is not in acute distress.     Appearance: Normal appearance. He is well-developed.   Pulmonary:      Effort: Pulmonary effort is normal.   Skin:     Findings: No rash.   Neurological:      Mental Status: He is alert.   Psychiatric:         Mood and Affect: Mood normal.         Behavior: Behavior normal.         Assessment/Plan   Problem List Items Addressed This Visit          Medium    Bipolar disorder, current episode manic without psychotic features (Multi)    Relevant  Medications    ARIPiprazole (Abilify) 10 mg tablet    Moderate episode of recurrent major depressive disorder (Multi) - Primary   He has noticed approximately 80% improvement in symptoms since starting aripiprazole, but is still having some anxiety as well as irritability symptoms and would like to increase the dose.  No recent manic symptoms.  Will increase dose from 5 to 10 mg daily.  Follow-up in 1 month if not improved, otherwise can follow-up in 3 months for recheck

## 2024-05-01 ENCOUNTER — OFFICE VISIT (OUTPATIENT)
Dept: PRIMARY CARE | Facility: CLINIC | Age: 24
End: 2024-05-01
Payer: COMMERCIAL

## 2024-05-01 VITALS
OXYGEN SATURATION: 98 % | BODY MASS INDEX: 28.04 KG/M2 | DIASTOLIC BLOOD PRESSURE: 78 MMHG | TEMPERATURE: 98.3 F | RESPIRATION RATE: 18 BRPM | WEIGHT: 179 LBS | SYSTOLIC BLOOD PRESSURE: 122 MMHG | HEART RATE: 88 BPM

## 2024-05-01 DIAGNOSIS — R11.2 NAUSEA AND VOMITING IN ADULT: Primary | ICD-10-CM

## 2024-05-01 PROCEDURE — 99213 OFFICE O/P EST LOW 20 MIN: CPT | Performed by: NURSE PRACTITIONER

## 2024-05-01 RX ORDER — ONDANSETRON 4 MG/1
4 TABLET, ORALLY DISINTEGRATING ORAL EVERY 8 HOURS PRN
Qty: 9 TABLET | Refills: 0 | Status: SHIPPED | OUTPATIENT
Start: 2024-05-01 | End: 2024-05-04

## 2024-05-01 ASSESSMENT — ENCOUNTER SYMPTOMS
FATIGUE: 0
APPETITE CHANGE: 0
NAUSEA: 1
DIARRHEA: 0
FEVER: 0
RESPIRATORY NEGATIVE: 1
CHILLS: 0
ABDOMINAL PAIN: 0
VOMITING: 1

## 2024-05-01 NOTE — PROGRESS NOTES
Subjective   Patient ID: Facundo Thomas is a 23 y.o. male who presents for Vomiting. Occurred after eating gas station food    Patient says that he had pizza (cardoso, cheddar ranch) at a gas station today at noon. Pt says that he started vomiting prior to coming here. He has had food poisoning from this gas station before. Pt is still feeling nauseated. No abdominal pain now.     Pt has had food poisoning from this gas station before.    Review of Systems   Constitutional:  Negative for appetite change, chills, fatigue and fever.   HENT: Negative.     Respiratory: Negative.     Gastrointestinal:  Positive for nausea and vomiting. Negative for abdominal pain and diarrhea.   Skin: Negative.      Objective   /78   Pulse 88   Temp 36.8 °C (98.3 °F)   Resp 18   Wt 81.2 kg (179 lb)   SpO2 98%   BMI 28.04 kg/m²     Physical Exam  Vitals reviewed.   Constitutional:       General: He is not in acute distress.     Appearance: Normal appearance. He is not ill-appearing or toxic-appearing.   HENT:      Head: Atraumatic.   Eyes:      Conjunctiva/sclera: Conjunctivae normal.   Cardiovascular:      Rate and Rhythm: Normal rate and regular rhythm.      Heart sounds: Normal heart sounds. No murmur heard.  Pulmonary:      Effort: Pulmonary effort is normal.      Breath sounds: Normal breath sounds. No wheezing or rhonchi.   Abdominal:      General: Bowel sounds are normal. There is no distension.      Palpations: Abdomen is soft.      Tenderness: There is no abdominal tenderness. There is no guarding or rebound.   Skin:     General: Skin is warm and dry.   Neurological:      General: No focal deficit present.      Mental Status: He is alert.   Psychiatric:         Mood and Affect: Mood normal.     Assessment/Plan   Problem List Items Addressed This Visit    None  Visit Diagnoses         Codes    Nausea and vomiting in adult    -  Primary R11.2    Relevant Medications    ondansetron ODT (Zofran-ODT) 4 mg disintegrating  tablet        Patient with nausea and vomiting from food poisoning. Patient has had zofran before and it seems to help. Advised pt to stay well hydrated and drink plenty of fluids. Pt advised to go to the ER for any worsening vomiting, s/s of dehydration or new/concerning symptoms; he agreed.

## 2024-05-13 ENCOUNTER — TELEPHONE (OUTPATIENT)
Dept: PRIMARY CARE | Facility: CLINIC | Age: 24
End: 2024-05-13
Payer: COMMERCIAL

## 2024-05-13 DIAGNOSIS — F31.10 BIPOLAR DISORDER, CURRENT EPISODE MANIC WITHOUT PSYCHOTIC FEATURES (MULTI): Primary | ICD-10-CM

## 2024-05-13 NOTE — TELEPHONE ENCOUNTER
Patient is scheduled in July for a 3mo fuv; however he would like tyroid labs before then.   He states he is staying very tiered even when going to bed early.   Could you place these labs, or does this require an appt?    Please advise, Thanks!

## 2024-05-13 NOTE — TELEPHONE ENCOUNTER
Called [phone] & advised patient to go to nearest  facility to complete blood work, & if fatigue continues we will need to schedule an office visit. 05/13/24 @ 2455. -

## 2024-05-15 ENCOUNTER — LAB (OUTPATIENT)
Dept: LAB | Facility: LAB | Age: 24
End: 2024-05-15
Payer: COMMERCIAL

## 2024-05-15 DIAGNOSIS — F31.10 BIPOLAR DISORDER, CURRENT EPISODE MANIC WITHOUT PSYCHOTIC FEATURES (MULTI): ICD-10-CM

## 2024-05-15 LAB — TSH SERPL-ACNC: 1.89 MIU/L (ref 0.44–3.98)

## 2024-05-15 PROCEDURE — 36415 COLL VENOUS BLD VENIPUNCTURE: CPT

## 2024-05-15 PROCEDURE — 84443 ASSAY THYROID STIM HORMONE: CPT

## 2024-05-17 ENCOUNTER — TELEPHONE (OUTPATIENT)
Dept: PRIMARY CARE | Facility: CLINIC | Age: 24
End: 2024-05-17

## 2024-05-17 ENCOUNTER — TELEMEDICINE (OUTPATIENT)
Dept: PRIMARY CARE | Facility: CLINIC | Age: 24
End: 2024-05-17
Payer: COMMERCIAL

## 2024-05-17 ENCOUNTER — APPOINTMENT (OUTPATIENT)
Dept: PRIMARY CARE | Facility: CLINIC | Age: 24
End: 2024-05-17
Payer: COMMERCIAL

## 2024-05-17 DIAGNOSIS — S39.012A STRAIN OF LUMBAR REGION, INITIAL ENCOUNTER: Primary | ICD-10-CM

## 2024-05-17 PROCEDURE — 99213 OFFICE O/P EST LOW 20 MIN: CPT | Performed by: FAMILY MEDICINE

## 2024-05-17 RX ORDER — CYCLOBENZAPRINE HCL 5 MG
5 TABLET ORAL NIGHTLY PRN
Qty: 15 TABLET | Refills: 0 | Status: SHIPPED | OUTPATIENT
Start: 2024-05-17

## 2024-05-17 ASSESSMENT — ENCOUNTER SYMPTOMS
BACK PAIN: 1
FEVER: 0
HEADACHES: 1

## 2024-05-17 NOTE — LETTER
May 17, 2024     Patient: Facundo Thomas   YOB: 2000   Date of Visit: 5/17/2024       To Whom It May Concern:    Facundo Thomas was seen in my clinic on 5/17/2024 at ?. Please excuse Facundo for his absence from work on this day to make the appointment.    If you have any questions or concerns, please don't hesitate to call.         Sincerely,         Дмитрий Bill, DO

## 2024-05-17 NOTE — PROGRESS NOTES
Subjective   Patient ID: Facundo Thomas is a 23 y.o. male who presents for Headache.    Virtual or Telephone Consent    An interactive audio and video telecommunication system which permits real time communications between the patient (at the originating site) and provider (at the distant site) was utilized to provide this telehealth service.   Verbal consent was requested and obtained from Facundo Thomas on this date, 05/17/24 for a telehealth visit.     Back Pain  This is a recurrent problem. The current episode started in the past 7 days. Associated symptoms include headaches. Pertinent negatives include no fever.        Today's visit was done virtually.  He would like to discuss back pain  He developed pain in his left lower back 2 days ago.  This was a 7 out of 10 intensity stabbing pain which was occasionally radiating to his left leg.  He has been taking ibuprofen which has been helping.  Pain has improved and is now a 3-4 out of 10 intensity.  Has had similar episodes of back pain in the past  No injury prior to onset  He also did have a headache when the back pain started.  This is improving also  He missed work on Wednesday and needs a work note        Review of Systems   Constitutional:  Negative for fever.   Musculoskeletal:  Positive for back pain.   Neurological:  Positive for headaches.       Objective   There were no vitals taken for this visit.    Physical Exam  Constitutional:       General: He is not in acute distress.     Appearance: Normal appearance. He is well-developed.   Pulmonary:      Effort: Pulmonary effort is normal.   Skin:     Findings: No rash.   Neurological:      Mental Status: He is alert.   Psychiatric:         Mood and Affect: Mood normal.         Behavior: Behavior normal.         Assessment/Plan   Problem List Items Addressed This Visit          Medium    Strain of lumbar region - Primary    Relevant Medications    cyclobenzaprine (Flexeril) 5 mg tablet   Back pain has been  improving.  Continue ibuprofen and I sent in a prescription for cyclobenzaprine 5 mg as needed.  Adverse effects discussed including drowsiness.  Follow-up in 1 to 2 weeks if back pain has not completely resolved

## 2024-05-23 ENCOUNTER — OFFICE VISIT (OUTPATIENT)
Dept: PRIMARY CARE | Facility: CLINIC | Age: 24
End: 2024-05-23
Payer: COMMERCIAL

## 2024-05-23 ENCOUNTER — TELEPHONE (OUTPATIENT)
Dept: PRIMARY CARE | Facility: CLINIC | Age: 24
End: 2024-05-23

## 2024-05-23 VITALS
HEART RATE: 83 BPM | OXYGEN SATURATION: 98 % | BODY MASS INDEX: 28.66 KG/M2 | SYSTOLIC BLOOD PRESSURE: 128 MMHG | DIASTOLIC BLOOD PRESSURE: 82 MMHG | WEIGHT: 183 LBS

## 2024-05-23 DIAGNOSIS — S16.1XXA STRAIN OF NECK MUSCLE, INITIAL ENCOUNTER: Primary | ICD-10-CM

## 2024-05-23 PROCEDURE — 99213 OFFICE O/P EST LOW 20 MIN: CPT | Performed by: FAMILY MEDICINE

## 2024-05-23 RX ORDER — MELOXICAM 15 MG/1
15 TABLET ORAL DAILY PRN
Qty: 30 TABLET | Refills: 0 | Status: SHIPPED | OUTPATIENT
Start: 2024-05-23

## 2024-05-23 ASSESSMENT — ENCOUNTER SYMPTOMS
FEVER: 0
WEAKNESS: 0
NECK PAIN: 1
NUMBNESS: 0

## 2024-05-23 NOTE — TELEPHONE ENCOUNTER
Patient has had neck pain for the past 2 days, he is requesting to be seen to day.     Please Advise

## 2024-05-23 NOTE — PROGRESS NOTES
Subjective   Patient ID: Facundo Thomas is a 23 y.o. male who presents for Neck Pain.    Neck Pain   This is a new problem. Episode onset: 2 days. The pain is associated with nothing. The pain is present in the right side. The quality of the pain is described as aching. The pain is at a severity of 8/10. Pertinent negatives include no fever, numbness or weakness.   Here today to discuss neck pain  Started 2 days ago.  No injury.  He woke up with neck pain  Pain is located right neck and is most noticeable when he rotates his head to the right side.  Pain is 8 out of 10 intensity aching pain.  Taking ibuprofen without much improvement.  No chest pain.  No dizziness or vision changes.  No headache or fever.  Not radiation.  No focal numbness or weakness in his extremity      Review of Systems   Constitutional:  Negative for fever.   Musculoskeletal:  Positive for neck pain.   Neurological:  Negative for weakness and numbness.       Objective   /82   Pulse 83   Wt 83 kg (183 lb)   SpO2 98%   BMI 28.66 kg/m²     Physical Exam  Vitals reviewed.   Constitutional:       General: He is not in acute distress.     Appearance: Normal appearance. He is well-developed.   HENT:      Head: Normocephalic.   Eyes:      Conjunctiva/sclera: Conjunctivae normal.   Cardiovascular:      Rate and Rhythm: Normal rate and regular rhythm.      Heart sounds: Normal heart sounds.   Pulmonary:      Effort: Pulmonary effort is normal.      Breath sounds: Normal breath sounds.   Musculoskeletal:         General: Tenderness present.      Comments: Tenderness involving the right cervical paraspinals C3-C7.  Increased pain with right rotation.  He has full left and right rotation.  Upper extremity strength is plus 5 out of 5 bilaterally   Skin:     Findings: No rash.   Neurological:      Mental Status: He is alert.   Psychiatric:         Mood and Affect: Mood normal.         Behavior: Behavior normal.         Assessment/Plan   Problem List  Items Addressed This Visit    None  Visit Diagnoses       Strain of neck muscle, initial encounter    -  Primary    Relevant Medications    meloxicam (Mobic) 15 mg tablet        We will start treatment with meloxicam daily as needed.  He has a prescription for flexeril at home to take as needed.  Recommend rest, and follow-up in the next 1 to 2 weeks if symptoms do not resolve

## 2024-05-23 NOTE — LETTER
May 23, 2024     Patient: Facundo Thomas   YOB: 2000   Date of Visit: 5/23/2024       To Whom It May Concern:    Facundo Thomas was seen in my clinic on 5/23/2024 at ?. Please excuse Facundo for his absence from work on this day to make the appointment.    If you have any questions or concerns, please don't hesitate to call.         Sincerely,         Дмитрий Bill, DO

## 2024-05-29 ENCOUNTER — TELEPHONE (OUTPATIENT)
Dept: PRIMARY CARE | Facility: CLINIC | Age: 24
End: 2024-05-29

## 2024-05-29 ENCOUNTER — OFFICE VISIT (OUTPATIENT)
Dept: PRIMARY CARE | Facility: CLINIC | Age: 24
End: 2024-05-29
Payer: COMMERCIAL

## 2024-05-29 VITALS
TEMPERATURE: 96.3 F | SYSTOLIC BLOOD PRESSURE: 128 MMHG | WEIGHT: 181 LBS | BODY MASS INDEX: 28.35 KG/M2 | DIASTOLIC BLOOD PRESSURE: 80 MMHG | HEART RATE: 85 BPM | OXYGEN SATURATION: 97 %

## 2024-05-29 DIAGNOSIS — K52.9 ACUTE GASTROENTERITIS: Primary | ICD-10-CM

## 2024-05-29 PROCEDURE — 99213 OFFICE O/P EST LOW 20 MIN: CPT | Performed by: FAMILY MEDICINE

## 2024-05-29 RX ORDER — ONDANSETRON 4 MG/1
4 TABLET, FILM COATED ORAL EVERY 8 HOURS PRN
Qty: 20 TABLET | Refills: 0 | Status: SHIPPED | OUTPATIENT
Start: 2024-05-29

## 2024-05-29 ASSESSMENT — ENCOUNTER SYMPTOMS
VOMITING: 1
ABDOMINAL PAIN: 1
FEVER: 0
DIARRHEA: 1

## 2024-05-29 NOTE — PROGRESS NOTES
Subjective   Patient ID: Facundo Thomas is a 23 y.o. male who presents for Diarrhea.    Diarrhea   This is a new problem. Episode onset: Onset 5/28/2024. The problem has been gradually worsening. Associated symptoms include abdominal pain and vomiting. Pertinent negatives include no fever.   He developed GI symptoms 2 days ago.  Has had abdominal cramping, vomiting and diarrhea.  Also has nausea.  Abdominal cramping is not severe.  No blood or mucus in stool.  No fever.  He has had a decreased appetite  Nothing to treat symptoms    Review of Systems   Constitutional:  Negative for fever.   Gastrointestinal:  Positive for abdominal pain, diarrhea and vomiting.       Objective   /80   Pulse 85   Temp 35.7 °C (96.3 °F) (Temporal)   Wt 82.1 kg (181 lb)   SpO2 97%   BMI 28.35 kg/m²     Physical Exam  Vitals reviewed.   Constitutional:       General: He is not in acute distress.     Appearance: Normal appearance. He is well-developed.   HENT:      Head: Normocephalic.   Eyes:      Conjunctiva/sclera: Conjunctivae normal.   Cardiovascular:      Rate and Rhythm: Normal rate and regular rhythm.      Heart sounds: Normal heart sounds.   Pulmonary:      Effort: Pulmonary effort is normal.      Breath sounds: Normal breath sounds.   Abdominal:      Palpations: Abdomen is soft.      Tenderness: There is no abdominal tenderness.   Skin:     Findings: No rash.   Neurological:      Mental Status: He is alert.   Psychiatric:         Mood and Affect: Mood normal.         Behavior: Behavior normal.         Assessment/Plan   Problem List Items Addressed This Visit    None  Visit Diagnoses       Acute gastroenteritis    -  Primary    Relevant Medications    ondansetron (Zofran) 4 mg tablet        Abdomen is soft and nontender on exam today.  We discussed symptomatic care including ensuring adequate fluid intake and we will start Zofran as needed for nausea.  Discussed indications for ED including any severe or continuous  abdominal pain, concerns for dehydration, fever, or blood or mucus in stool.  Follow-up in the next 4 to 5 days if symptoms do not resolve

## 2024-05-29 NOTE — LETTER
May 29, 2024     Patient: Facundo Thomas   YOB: 2000   Date of Visit: 5/29/2024       To Whom It May Concern:    Facundo Thomas was seen in my clinic on 5/29/2024.  Please excuse Facundo for his absence from work on 05/29/2024.  He will return to work tomorrow 05/30/2024.    If you have any questions or concerns, please don't hesitate to call.         Sincerely,         Дмитрий Bill, DO

## 2024-06-14 ENCOUNTER — TELEPHONE (OUTPATIENT)
Dept: PRIMARY CARE | Facility: CLINIC | Age: 24
End: 2024-06-14
Payer: COMMERCIAL

## 2024-06-14 NOTE — TELEPHONE ENCOUNTER
Patient called and states his back is starting to hurt and the muscle relaxer you prescribed isn't working.    Please Advise  [phone]

## 2024-06-25 ENCOUNTER — APPOINTMENT (OUTPATIENT)
Dept: PRIMARY CARE | Facility: CLINIC | Age: 24
End: 2024-06-25
Payer: COMMERCIAL

## 2024-06-25 VITALS
OXYGEN SATURATION: 96 % | BODY MASS INDEX: 28.19 KG/M2 | SYSTOLIC BLOOD PRESSURE: 138 MMHG | DIASTOLIC BLOOD PRESSURE: 84 MMHG | TEMPERATURE: 97.1 F | HEART RATE: 66 BPM | WEIGHT: 180 LBS

## 2024-06-25 DIAGNOSIS — G89.29 CHRONIC LOW BACK PAIN, UNSPECIFIED BACK PAIN LATERALITY, UNSPECIFIED WHETHER SCIATICA PRESENT: Primary | ICD-10-CM

## 2024-06-25 DIAGNOSIS — M54.50 CHRONIC LOW BACK PAIN, UNSPECIFIED BACK PAIN LATERALITY, UNSPECIFIED WHETHER SCIATICA PRESENT: Primary | ICD-10-CM

## 2024-06-25 DIAGNOSIS — J30.9 ALLERGIC RHINITIS, UNSPECIFIED SEASONALITY, UNSPECIFIED TRIGGER: ICD-10-CM

## 2024-06-25 PROCEDURE — 99213 OFFICE O/P EST LOW 20 MIN: CPT | Performed by: FAMILY MEDICINE

## 2024-06-25 RX ORDER — CETIRIZINE HYDROCHLORIDE 10 MG/1
10 TABLET ORAL DAILY PRN
Qty: 30 TABLET | Refills: 3 | Status: SHIPPED | OUTPATIENT
Start: 2024-06-25

## 2024-06-25 ASSESSMENT — ENCOUNTER SYMPTOMS
WEAKNESS: 0
BACK PAIN: 1
NUMBNESS: 0

## 2024-06-25 NOTE — PROGRESS NOTES
Subjective   Patient ID: Facundo Thomas is a 24 y.o. male who presents for Back Pain.    Back Pain  This is a recurrent problem. The quality of the pain is described as shooting and aching. The pain is at a severity of 7/10. Pertinent negatives include no numbness or weakness. (Pain shooting down left leg )   Here today to discuss back pain  He has a history of intermittent back pain over the past 2 years.  He believes that he had initially injured his back lifting something heavy several years ago  He will get back pain every few days  Most recently he developed pain in his lower back yesterday.  This radiates down to his left leg.  Back pain was an 8 out of 10 intensity pain yesterday, but now is a 6-7 out of 10 pain today.  He took Flexeril which did not help.  Taking ibuprofen with some improvement.  No hip pain.  No lower extremity numbness or weakness  He has done PT in the past    He previously had a CT scan of his lumbar spine done October 2022 which was unremarkable  Review of Systems   Musculoskeletal:  Positive for back pain.   Neurological:  Negative for weakness and numbness.       Objective   /84   Pulse 66   Temp 36.2 °C (97.1 °F) (Temporal)   Wt 81.6 kg (180 lb)   SpO2 96%   BMI 28.19 kg/m²     Physical Exam  Vitals reviewed.   Constitutional:       General: He is not in acute distress.     Appearance: Normal appearance. He is well-developed.   HENT:      Head: Normocephalic.   Eyes:      Conjunctiva/sclera: Conjunctivae normal.   Cardiovascular:      Rate and Rhythm: Normal rate and regular rhythm.      Heart sounds: Normal heart sounds.   Pulmonary:      Effort: Pulmonary effort is normal.      Breath sounds: Normal breath sounds.   Musculoskeletal:         General: Tenderness present.      Right lower leg: No edema.      Left lower leg: No edema.      Comments: Tenderness involving bilateral L3-L5 lumbar paraspinals.  Lower extremity strength was 5 out of 5.  Patellar reflexes intact    Skin:     Findings: No rash.   Neurological:      Mental Status: He is alert.   Psychiatric:         Mood and Affect: Mood normal.         Behavior: Behavior normal.         Assessment/Plan   Problem List Items Addressed This Visit          Medium    Allergic rhinitis    Relevant Medications    cetirizine (ZyrTEC) 10 mg tablet    Chronic back pain - Primary   Umber 1 back pain: He presents today with a history of intermittent back pain over the past several years, with back pain which started to worsen yesterday.  We discussed continuing NSAIDs, using ice/heat as needed, and avoiding heavy lifting and any other aggravating activities.  I did offer a steroid, however he does not feel that the back pain is severe enough to require this since it is improving today.  Recommended that he continue current care including ibuprofen as needed and contact us in the next 1 to 2 weeks if back pain is not improving  He has a history of allergic rhinitis with symptoms which get worse in the spring and summer.  Requesting refill on cetirizine which works well when he takes it.  I gave him a refill on this today

## 2024-07-15 ENCOUNTER — TELEPHONE (OUTPATIENT)
Dept: PRIMARY CARE | Facility: CLINIC | Age: 24
End: 2024-07-15
Payer: COMMERCIAL

## 2024-07-15 DIAGNOSIS — M54.50 CHRONIC LOW BACK PAIN, UNSPECIFIED BACK PAIN LATERALITY, UNSPECIFIED WHETHER SCIATICA PRESENT: Primary | ICD-10-CM

## 2024-07-15 DIAGNOSIS — G89.29 CHRONIC LOW BACK PAIN, UNSPECIFIED BACK PAIN LATERALITY, UNSPECIFIED WHETHER SCIATICA PRESENT: Primary | ICD-10-CM

## 2024-07-15 NOTE — TELEPHONE ENCOUNTER
Patient would like to have a referral for PT.  He states his back is getting worse    Please Advise    [phone]

## 2024-07-19 NOTE — PROGRESS NOTES
Physical Therapy  Physical Therapy Evaluation    Patient Name: Facundo Thomas  MRN: [id number]  Today's Date: 7/22/2024  Time Calculation  Start Time: 0947  Stop Time: 1014  Time Calculation (min): 27 min  PT Evaluation Time Entry  PT Evaluation (Low) Time Entry: 27                    Insurance:  COPAY 0 DED 0 COVERAGE 100  Visit number: 1 of TBD  Authorization info: AUTH REQ AFTER IE   Insurance Type: [employer]     General:  Reason for visit: LBP  Referred by: Дмитрий Bill DO     Current Problem:  M54.50,G89.29 Chronic low back pain, unspecified back pain laterality, unspecified whether sciatica present     Precautions: None         Medical History Form: Reviewed (scanned into chart)    Subjective:   This is a recurrent problem. The quality of the pain is described as shooting and aching. The pain is at a severity of 7/10. Pertinent negatives include no numbness or weakness. (Pain shooting down left leg )   Here today to discuss back pain  He has a history of intermittent back pain over the past 2 years.  He believes that he had initially injured his back lifting something heavy several years ago  He will get back pain every few days  Most recently he developed pain in his lower back yesterday.  This radiates down to his left leg.  Back pain was an 8 out of 10 intensity pain yesterday, but now is a 6-7 out of 10 pain today.  He took Flexeril which did not help.  Taking ibuprofen with some improvement.  No hip pain.  No lower extremity numbness or weakness  He has done PT in the past-Encounter note 6/25/24    Chief Complaint: Patient presents to clinic L side LBP  Onset Date: Chronic, but exacerbated ~3 wks ago  MAXIMINO: NA    Current Condition:   Worse    Pain:  Pain Assessment: 0-10  0-10 (Numeric) Pain Score: 7  Pain Type: Chronic pain  Pain Location: Back  Pain Orientation: Lower, Left  Pain Radiating Towards: NA  Pain Descriptors: Aching  Pain Frequency: Intermittent  Clinical Progression: Gradually  worsening  Location: Low Left Back  Description: Ache  Aggravating Factors:  States none  Relieving Factors:  NA    Relevant Information (PMH & Previous Tests/Imaging): NA    Previous Interventions/Treatments: None    Prior Level of Function (PLOF)  Patient previously independent with all ADLs  Exercise/Physical Activity: NA  Work/School: Unemployed    Patients Living Environment: Reviewed and no concern    Primary Language: English    Patient's Goal(s) for Therapy: Find solution as to what's going on.    Red Flags: Do you have any of the following? No  Fever/chills, unexplained weight changes, dizziness/fainting, unexplained change in bowel or bladder functions, unexplained malaise or muscle weakness, night pain/sweats, numbness or tingling    Objective:  Slump (-)  Leg Lowering (+)  Weak core 3+/5  BL Hip Scouring (-)    LUMBAR AROM  FF 30  EX WFL  RR WFL  LR WFL  RSB 15  LSB 20    HIP AROM  R  L    Posture: WFL-Slight rounded shoulders    Lower Extremity Functional Movements  Transfers: Ind  Gait: Ind w/o AD    Outcome Measures:  Other Measures  Oswestry Disablity Index (JING): 12      EDUCATION: Pt educated in PT POC, anatomy, activity avoidance, proper positioning/posture, use of heat/cold , pt demonstrates understanding of PT info, all questions answered.        Goals: Set and discussed today  Increase ROM to WFL of LB  Increase Strength where deficits noted of core mm by 1/3 to 1 mm grade   Ind w/ HEP to expedite progress and promote goal achievement for pt.    Improve Outcome score for evidence of improved function.  Return to PLOF   Decrease pain to 2/10 or less      Plan of care was developed with input and agreement by the patient.    Treatment Performed:    Eval only this date        Assessment: 23 y/o M presents with c/o LBP. Upon examination patient demonstrates mild limiting overall functional mobility including lifting son. Pt to benefit from outpatient PT to address deficits, maximize functional  mobility and improve QOL.  The clinical presentation of this patient is stable and their history and examination findings are consistent with a low complexity evaluation with good rehab potential.            Plan:     Planned Interventions include: therapeutic exercise, self-care home management, manual therapy, therapeutic activities, gait training, neuromuscular coordination, vasopneumatic, dry needling, aquatic therapy  Frequency: 2x/wk  Duration: 4wks      Arjun Aquino PT, PT

## 2024-07-22 ENCOUNTER — EVALUATION (OUTPATIENT)
Dept: PHYSICAL THERAPY | Facility: CLINIC | Age: 24
End: 2024-07-22
Payer: COMMERCIAL

## 2024-07-22 DIAGNOSIS — G89.29 CHRONIC LOW BACK PAIN, UNSPECIFIED BACK PAIN LATERALITY, UNSPECIFIED WHETHER SCIATICA PRESENT: ICD-10-CM

## 2024-07-22 DIAGNOSIS — M54.50 CHRONIC LOW BACK PAIN, UNSPECIFIED BACK PAIN LATERALITY, UNSPECIFIED WHETHER SCIATICA PRESENT: ICD-10-CM

## 2024-07-22 PROCEDURE — 97161 PT EVAL LOW COMPLEX 20 MIN: CPT | Mod: GP | Performed by: PHYSICAL THERAPIST

## 2024-07-22 ASSESSMENT — PAIN DESCRIPTION - DESCRIPTORS: DESCRIPTORS: ACHING

## 2024-07-22 ASSESSMENT — PAIN - FUNCTIONAL ASSESSMENT: PAIN_FUNCTIONAL_ASSESSMENT: 0-10

## 2024-07-22 ASSESSMENT — PAIN SCALES - GENERAL: PAINLEVEL_OUTOF10: 7

## 2024-07-23 ENCOUNTER — APPOINTMENT (OUTPATIENT)
Dept: PRIMARY CARE | Facility: CLINIC | Age: 24
End: 2024-07-23

## 2024-08-01 ENCOUNTER — APPOINTMENT (OUTPATIENT)
Dept: PHYSICAL THERAPY | Facility: CLINIC | Age: 24
End: 2024-08-01
Payer: COMMERCIAL

## 2024-08-02 ENCOUNTER — TELEPHONE (OUTPATIENT)
Dept: PRIMARY CARE | Facility: CLINIC | Age: 24
End: 2024-08-02
Payer: COMMERCIAL

## 2024-08-06 ENCOUNTER — TREATMENT (OUTPATIENT)
Dept: PHYSICAL THERAPY | Facility: CLINIC | Age: 24
End: 2024-08-06
Payer: COMMERCIAL

## 2024-08-06 DIAGNOSIS — G89.29 CHRONIC LOW BACK PAIN, UNSPECIFIED BACK PAIN LATERALITY, UNSPECIFIED WHETHER SCIATICA PRESENT: ICD-10-CM

## 2024-08-06 DIAGNOSIS — M54.50 CHRONIC LOW BACK PAIN, UNSPECIFIED BACK PAIN LATERALITY, UNSPECIFIED WHETHER SCIATICA PRESENT: ICD-10-CM

## 2024-08-06 PROCEDURE — 97110 THERAPEUTIC EXERCISES: CPT | Mod: GP,CQ

## 2024-08-06 ASSESSMENT — PAIN SCALES - GENERAL: PAINLEVEL_OUTOF10: 5 - MODERATE PAIN

## 2024-08-06 ASSESSMENT — PAIN DESCRIPTION - DESCRIPTORS: DESCRIPTORS: ACHING

## 2024-08-06 ASSESSMENT — PAIN - FUNCTIONAL ASSESSMENT: PAIN_FUNCTIONAL_ASSESSMENT: 0-10

## 2024-08-06 NOTE — PROGRESS NOTES
"Physical Therapy Treatment    Patient Name: Facundo Thomas  MRN: [id number]  Today's Date: 8/6/2024  Time Calculation  Start Time: 0947  Stop Time: 1027  Time Calculation (min): 40 min    Insurance  COPAY 0 DED 0 COVERAGE 100  Visit number: 1 of TBD  Authorization info: AUTH REQ AFTER IE   Insurance Type: [employer]      Current Problem  1. Chronic low back pain, unspecified back pain laterality, unspecified whether sciatica present  Follow Up In Physical Therapy      General  Referred By: DEBORA Dhaliwal    General   Pt has no new complaints.  States he didn't feel too bad following first visit.  \"It was pretty good.\"  He states he looked up some ex's online & has been trying to do some throughout the day.  He reports he is doing a little better overall.  States he did some therapy earlier in the year & states he was told it could be his back or it could be his hip.    Precautions  Precautions  Precautions Comment: No recent falls reported    Pain  Pain Assessment  Pain Assessment: 0-10  0-10 (Numeric) Pain Score: 5 - Moderate pain  Pain Type: Chronic pain  Pain Location: Back  Pain Orientation: Lower, Left  Pain Radiating Towards: Occasionally into L LE to mid thigh  Pain Descriptors: Aching  Pain Frequency: Intermittent  Clinical Progression: Gradually improving  Effect of Pain on Daily Activities: Sitting>30 minutes; Sleep; Sit to Stand    Objective   No current c/o numbness, tingling.  He does report occasional sx's into L LE, which he states will radiate from left low back to mid thigh.    Treatments:  Therapeutic Exercises (58382): 39 Minutes, 3 Units    Activities:  Abdominal Brace: 5\" x20  LTR: 5\" x 3 minutes  Supine March w/AB: 2x10  Supine Heel Walkout: --> Add NV  Sidelying Clamshell: 2x10 (L)  SLR's (flexion, abduction): 2x10 each (L only today)  Prone Hip Extension: -->Add NV  Bridge: 2x10  Hamstring Stretch: 30\" x3 (L)  Piriformis Stretch: 30\" x3 (L)  Hip Fllexor Stretch: 30\" x3 (L)  SKTC: 10\" x10 " (L)    Assessment:  Initiated rehab activities this visit to increase LE/core strength, stability & flexibility to reduce sx's in low back & improve capacity for daily activities. He exhibits good ability to complete activities given & had good follow through to any instruction provided for activities.  No increase in sx's with activities today & normal fatigue noted.  Pt provided with handouts this visit to begin HEP.  Anticipate he will do well with rehab POC & be able to continue progressing with activities next visit, as tolerated.    Plan:   Assess HEP next visit.  Assess response to activities initiated this visit.  Continue with POC & progress as tolerated to increase LE/core strength, stability; reduce sx's & improve functional capacity for daily activities.    OP EDUCATION:   Access Code: [email or web address]  URL: [email or web address]  Date: 08/06/2024  Prepared by: Roshni Orozco    Exercises  - Supine Transversus Abdominis Bracing - Hands on Ground  - 2 x daily - 7 x weekly - 3 sets - 10 reps - 5 hold  - Lower Trunk Rotations  - 1 x daily - 7 x weekly - 3 sets - 10 reps - 5 hold  - Supine March  - 1 x daily - 7 x weekly - 3 sets - 10 reps  - Supine Bridge  - 1 x daily - 7 x weekly - 3 sets - 10 reps - 5 hold  - Clamshell with Resistance  - 1 x daily - 7 x weekly - 3 sets - 10 reps  - Sidelying Hip Abduction  - 1 x daily - 7 x weekly - 3 sets - 10 reps  - Supine Active Straight Leg Raise  - 1 x daily - 7 x weekly - 3 sets - 10 reps  - Hooklying Hamstring Stretch with Strap  - 1 x daily - 7 x weekly - 5 reps - 30 hold  - Supine Piriformis Stretch with Foot on Ground  - 1 x daily - 7 x weekly - 5 reps - 30 hold  - Modified Rogelio Stretch  - 1 x daily - 7 x weekly - 5 reps - 30 hold  - Hooklying Single Knee to Chest Stretch  - 1 x daily - 7 x weekly - 10 reps - 10 hold

## 2024-08-08 ENCOUNTER — TELEPHONE (OUTPATIENT)
Dept: ALLERGY | Facility: HOSPITAL | Age: 24
End: 2024-08-08
Payer: COMMERCIAL

## 2024-08-08 ENCOUNTER — APPOINTMENT (OUTPATIENT)
Dept: PHYSICAL THERAPY | Facility: CLINIC | Age: 24
End: 2024-08-08
Payer: COMMERCIAL

## 2024-08-09 NOTE — PROGRESS NOTES
"Physical Therapy Treatment    Patient Name: Facundo Thomas  MRN: [id number]  Today's Date: 8/9/2024       Insurance  COPAY 0 DED 0 COVERAGE 100  Visit number: 1 of TBD  Authorization info: AUTH REQ AFTER IE   Insurance Type: [employer]      Current Problem  No diagnosis found.  General  Referred By: DEBORA Dhaliwal    General   Pt has no new complaints.  States he didn't feel too bad following first visit.  \"It was pretty good.\"  He states he looked up some ex's online & has been trying to do some throughout the day.  He reports he is doing a little better overall.  States he did some therapy earlier in the year & states he was told it could be his back or it could be his hip.    Precautions       Pain       Objective   No current c/o numbness, tingling.  He does report occasional sx's into L LE, which he states will radiate from left low back to mid thigh.    Treatments:  Therapeutic Exercises (09614): 39 Minutes, 3 Units    Activities:  Abdominal Brace: 5\" x20  LTR: 5\" x 3 minutes  Supine March w/AB: 2x10  Supine Heel Walkout: --> Add NV  Sidelying Clamshell: 2x10 (L)  SLR's (flexion, abduction): 2x10 each (L only today)  Prone Hip Extension: -->Add NV  Bridge: 2x10  Hamstring Stretch: 30\" x3 (L)  Piriformis Stretch: 30\" x3 (L)  Hip Fllexor Stretch: 30\" x3 (L)  SKTC: 10\" x10 (L)    Assessment:  Initiated rehab activities this visit to increase LE/core strength, stability & flexibility to reduce sx's in low back & improve capacity for daily activities. He exhibits good ability to complete activities given & had good follow through to any instruction provided for activities.  No increase in sx's with activities today & normal fatigue noted.  Pt provided with handouts this visit to begin HEP.  Anticipate he will do well with rehab POC & be able to continue progressing with activities next visit, as tolerated.    Plan:   Assess HEP next visit.  Assess response to activities initiated this visit.  Continue with POC " & progress as tolerated to increase LE/core strength, stability; reduce sx's & improve functional capacity for daily activities.    OP EDUCATION:   Access Code: [email or web address]  URL: [email or web address]  Date: 08/06/2024  Prepared by: Roshni Orozco    Exercises  - Supine Transversus Abdominis Bracing - Hands on Ground  - 2 x daily - 7 x weekly - 3 sets - 10 reps - 5 hold  - Lower Trunk Rotations  - 1 x daily - 7 x weekly - 3 sets - 10 reps - 5 hold  - Supine March  - 1 x daily - 7 x weekly - 3 sets - 10 reps  - Supine Bridge  - 1 x daily - 7 x weekly - 3 sets - 10 reps - 5 hold  - Clamshell with Resistance  - 1 x daily - 7 x weekly - 3 sets - 10 reps  - Sidelying Hip Abduction  - 1 x daily - 7 x weekly - 3 sets - 10 reps  - Supine Active Straight Leg Raise  - 1 x daily - 7 x weekly - 3 sets - 10 reps  - Hooklying Hamstring Stretch with Strap  - 1 x daily - 7 x weekly - 5 reps - 30 hold  - Supine Piriformis Stretch with Foot on Ground  - 1 x daily - 7 x weekly - 5 reps - 30 hold  - Modified Rogelio Stretch  - 1 x daily - 7 x weekly - 5 reps - 30 hold  - Hooklying Single Knee to Chest Stretch  - 1 x daily - 7 x weekly - 10 reps - 10 hold

## 2024-08-12 ENCOUNTER — APPOINTMENT (OUTPATIENT)
Dept: PHYSICAL THERAPY | Facility: CLINIC | Age: 24
End: 2024-08-12
Payer: COMMERCIAL

## 2024-08-14 ENCOUNTER — APPOINTMENT (OUTPATIENT)
Dept: PHYSICAL THERAPY | Facility: CLINIC | Age: 24
End: 2024-08-14
Payer: COMMERCIAL

## 2024-08-21 ENCOUNTER — APPOINTMENT (OUTPATIENT)
Dept: PHYSICAL THERAPY | Facility: CLINIC | Age: 24
End: 2024-08-21
Payer: COMMERCIAL

## 2024-09-11 ENCOUNTER — OFFICE VISIT (OUTPATIENT)
Dept: PRIMARY CARE | Facility: CLINIC | Age: 24
End: 2024-09-11
Payer: COMMERCIAL

## 2024-09-11 ENCOUNTER — TELEPHONE (OUTPATIENT)
Dept: PRIMARY CARE | Facility: CLINIC | Age: 24
End: 2024-09-11

## 2024-09-11 VITALS
HEART RATE: 76 BPM | TEMPERATURE: 97.3 F | OXYGEN SATURATION: 96 % | SYSTOLIC BLOOD PRESSURE: 115 MMHG | DIASTOLIC BLOOD PRESSURE: 75 MMHG

## 2024-09-11 DIAGNOSIS — J20.9 ACUTE BRONCHITIS, UNSPECIFIED ORGANISM: ICD-10-CM

## 2024-09-11 LAB
POC RAPID INFLUENZA A: NEGATIVE
POC RAPID INFLUENZA B: NEGATIVE
POC SARS-COV-2 AG BINAX: NORMAL

## 2024-09-11 PROCEDURE — 87811 SARS-COV-2 COVID19 W/OPTIC: CPT | Performed by: FAMILY MEDICINE

## 2024-09-11 PROCEDURE — 99213 OFFICE O/P EST LOW 20 MIN: CPT | Performed by: FAMILY MEDICINE

## 2024-09-11 PROCEDURE — 87804 INFLUENZA ASSAY W/OPTIC: CPT | Performed by: FAMILY MEDICINE

## 2024-09-11 RX ORDER — AZITHROMYCIN 250 MG/1
TABLET, FILM COATED ORAL
Qty: 6 TABLET | Refills: 0 | Status: SHIPPED | OUTPATIENT
Start: 2024-09-11 | End: 2024-09-15

## 2024-09-11 RX ORDER — BENZONATATE 100 MG/1
100 CAPSULE ORAL 3 TIMES DAILY PRN
Qty: 21 CAPSULE | Refills: 0 | Status: SHIPPED | OUTPATIENT
Start: 2024-09-11 | End: 2024-09-18

## 2024-09-11 ASSESSMENT — ENCOUNTER SYMPTOMS
COUGH: 1
WHEEZING: 1
FATIGUE: 1
RHINORRHEA: 1
SHORTNESS OF BREATH: 0
FEVER: 0

## 2024-09-11 NOTE — PROGRESS NOTES
Subjective   Patient ID: Facundo Thomas is a 24 y.o. male who presents for URI.    Cough  This is a new problem. The current episode started in the past 7 days. Associated symptoms include rhinorrhea and wheezing. Pertinent negatives include no chest pain, fever or shortness of breath.      He has had a cough present for 1 week.  Cough is dry and accompanied by mild wheezing.  Cough has been getting worse  He has a runny and stuffy nose which he attributes to allergies  Feels tired  No chest pain, shortness of breath or leg edema  No sore throat or sinus pressure          Review of Systems   Constitutional:  Positive for fatigue. Negative for fever.   HENT:  Positive for congestion and rhinorrhea.    Respiratory:  Positive for cough and wheezing. Negative for shortness of breath.    Cardiovascular:  Negative for chest pain and leg swelling.       Objective   /75   Pulse 76   Temp 36.3 °C (97.3 °F) (Temporal)   SpO2 96%     Physical Exam  Vitals reviewed.   Constitutional:       General: He is not in acute distress.     Appearance: Normal appearance.   HENT:      Head: Normocephalic.      Right Ear: Tympanic membrane, ear canal and external ear normal.      Left Ear: Tympanic membrane, ear canal and external ear normal.      Nose: Nose normal.      Mouth/Throat:      Mouth: Mucous membranes are moist.      Pharynx: No oropharyngeal exudate or posterior oropharyngeal erythema.   Eyes:      Conjunctiva/sclera: Conjunctivae normal.   Cardiovascular:      Rate and Rhythm: Normal rate and regular rhythm.      Heart sounds: Normal heart sounds.   Pulmonary:      Effort: Pulmonary effort is normal.      Breath sounds: Normal breath sounds.   Lymphadenopathy:      Cervical: No cervical adenopathy.   Skin:     Findings: No rash.   Neurological:      Mental Status: He is alert.   Psychiatric:         Mood and Affect: Mood normal.         Behavior: Behavior normal.         Assessment/Plan   Assessment & Plan  Acute  bronchitis, unspecified organism    Orders:    POCT Influenza A/B manually resulted    POCT BinaxNOW Covid-19 Ag Card manually resulted    azithromycin (Zithromax) 250 mg tablet; Take 2 tablets (500 mg) by mouth once daily for 1 day, THEN 1 tablet (250 mg) once daily for 4 days.    benzonatate (Tessalon) 100 mg capsule; Take 1 capsule (100 mg) by mouth 3 times a day as needed for cough for up to 7 days. Do not crush or chew.     COVID and flu testing today are both negative.  Since the cough has been worsening at 1 week, I feel that it would be reasonable to start treatment with an antibiotic.  We will start azithromycin and also Tessalon Perles for cough.  Follow-up in the next 5-7 days if improving with antibiotic

## 2024-09-11 NOTE — LETTER
September 11, 2024     Patient: Facundo Thomas   YOB: 2000   Date of Visit: 9/11/2024       To Whom It May Concern:    Facundo Thomas was seen in my clinic on 9/11/2024 at . Please excuse Facundo for his absence from work on this day to make the appointment.    If you have any questions or concerns, please don't hesitate to call.         Sincerely,         Дмитрий Bill, DO

## 2024-09-11 NOTE — TELEPHONE ENCOUNTER
"Patient is not feeling well. Cough x1 week and very light headed. \"Just feels very sick\"    He would like to be seen today      Please Advise    [phone]  "

## 2024-09-18 ENCOUNTER — APPOINTMENT (OUTPATIENT)
Dept: ALLERGY | Facility: CLINIC | Age: 24
End: 2024-09-18
Payer: COMMERCIAL

## 2024-09-18 PROBLEM — M77.9 TENDINITIS: Status: RESOLVED | Noted: 2024-09-18 | Resolved: 2024-09-18

## 2024-09-18 PROBLEM — M25.579 ANKLE PAIN: Status: RESOLVED | Noted: 2024-09-18 | Resolved: 2024-09-18

## 2024-09-18 PROBLEM — R52 PAIN, UNSPECIFIED: Status: RESOLVED | Noted: 2022-08-19 | Resolved: 2024-09-18

## 2024-09-18 PROBLEM — J20.9 ACUTE BRONCHITIS: Status: RESOLVED | Noted: 2024-09-18 | Resolved: 2024-09-18

## 2024-09-18 PROBLEM — R19.7 DIARRHEA: Status: RESOLVED | Noted: 2024-09-18 | Resolved: 2024-09-18

## 2024-09-18 PROBLEM — M79.643 PAIN OF HAND: Status: RESOLVED | Noted: 2024-09-18 | Resolved: 2024-09-18

## 2024-09-18 PROBLEM — R51.9 HEADACHE: Status: ACTIVE | Noted: 2018-09-29

## 2024-09-18 PROBLEM — R52 GENERALIZED PAIN: Status: RESOLVED | Noted: 2024-09-18 | Resolved: 2024-09-18

## 2024-09-18 PROBLEM — U07.1 DISEASE DUE TO SEVERE ACUTE RESPIRATORY SYNDROME CORONAVIRUS 2 (SARS-COV-2): Status: RESOLVED | Noted: 2022-08-22 | Resolved: 2024-09-18

## 2024-09-18 PROBLEM — Z20.822 CONTACT WITH AND (SUSPECTED) EXPOSURE TO COVID-19: Status: RESOLVED | Noted: 2023-02-21 | Resolved: 2024-09-18

## 2024-09-18 PROBLEM — H10.11 ALLERGIC CONJUNCTIVITIS OF RIGHT EYE: Status: ACTIVE | Noted: 2024-09-18

## 2024-09-18 PROBLEM — R05.9 COUGH, UNSPECIFIED: Status: RESOLVED | Noted: 2022-08-22 | Resolved: 2024-09-18

## 2024-09-18 PROBLEM — M54.50 LOW BACK PAIN, UNSPECIFIED: Status: ACTIVE | Noted: 2022-10-05

## 2024-09-18 PROBLEM — B35.4 TINEA CORPORIS: Status: RESOLVED | Noted: 2024-09-18 | Resolved: 2024-09-18

## 2024-09-18 PROBLEM — J02.9 SORE THROAT: Status: RESOLVED | Noted: 2018-10-04 | Resolved: 2024-09-18

## 2024-09-18 PROBLEM — R11.2 NAUSEA AND VOMITING: Status: RESOLVED | Noted: 2019-01-22 | Resolved: 2024-09-18

## 2024-09-18 PROBLEM — R10.31 RIGHT LOWER QUADRANT ABDOMINAL PAIN: Status: RESOLVED | Noted: 2018-05-09 | Resolved: 2024-09-18

## 2024-09-18 PROBLEM — R09.81 NASAL CONGESTION: Status: RESOLVED | Noted: 2024-09-18 | Resolved: 2024-09-18

## 2024-09-30 ENCOUNTER — OFFICE VISIT (OUTPATIENT)
Dept: PRIMARY CARE | Facility: CLINIC | Age: 24
End: 2024-09-30
Payer: COMMERCIAL

## 2024-09-30 VITALS
BODY MASS INDEX: 28.04 KG/M2 | WEIGHT: 179 LBS | OXYGEN SATURATION: 96 % | DIASTOLIC BLOOD PRESSURE: 85 MMHG | SYSTOLIC BLOOD PRESSURE: 132 MMHG | HEART RATE: 74 BPM | TEMPERATURE: 97 F

## 2024-09-30 DIAGNOSIS — J20.9 ACUTE BRONCHITIS, UNSPECIFIED ORGANISM: Primary | ICD-10-CM

## 2024-09-30 PROCEDURE — 99213 OFFICE O/P EST LOW 20 MIN: CPT | Performed by: FAMILY MEDICINE

## 2024-09-30 RX ORDER — DOXYCYCLINE 100 MG/1
100 CAPSULE ORAL 2 TIMES DAILY
Qty: 14 CAPSULE | Refills: 0 | Status: SHIPPED | OUTPATIENT
Start: 2024-09-30 | End: 2024-10-07

## 2024-09-30 RX ORDER — GUAIFENESIN 600 MG/1
600 TABLET, EXTENDED RELEASE ORAL 2 TIMES DAILY PRN
Qty: 30 TABLET | Refills: 0 | Status: SHIPPED | OUTPATIENT
Start: 2024-09-30

## 2024-09-30 ASSESSMENT — ENCOUNTER SYMPTOMS
RHINORRHEA: 1
COUGH: 1
FEVER: 0
WHEEZING: 0

## 2024-09-30 NOTE — PROGRESS NOTES
Subjective   Patient ID: Facundo Thomas is a 24 y.o. male who presents for URI.    URI   Associated symptoms include congestion, coughing and rhinorrhea. Pertinent negatives include no wheezing.   Cough  This is a new problem. The current episode started in the past 7 days. The problem has been unchanged. Associated symptoms include rhinorrhea. Pertinent negatives include no fever or wheezing.      He has had a cough which has been present for 1 week.  Cough has not been improving.  Cough has been wet.  Also has nasal congestion with clear nasal drainage, and sinus pressure.  No fever    Review of Systems   Constitutional:  Negative for fever.   HENT:  Positive for congestion and rhinorrhea.    Respiratory:  Positive for cough. Negative for wheezing.        Objective   Wt 81.2 kg (179 lb)   BMI 28.04 kg/m²     Physical Exam  Vitals reviewed.   Constitutional:       General: He is not in acute distress.     Appearance: Normal appearance.   HENT:      Head: Normocephalic.      Right Ear: Tympanic membrane, ear canal and external ear normal.      Left Ear: Tympanic membrane, ear canal and external ear normal.      Nose: Congestion present.      Mouth/Throat:      Mouth: Mucous membranes are moist.      Pharynx: No oropharyngeal exudate or posterior oropharyngeal erythema.   Eyes:      Conjunctiva/sclera: Conjunctivae normal.   Cardiovascular:      Rate and Rhythm: Normal rate and regular rhythm.      Heart sounds: Normal heart sounds.   Pulmonary:      Effort: Pulmonary effort is normal.      Breath sounds: Normal breath sounds.   Lymphadenopathy:      Cervical: No cervical adenopathy.   Skin:     Findings: No rash.   Neurological:      Mental Status: He is alert.   Psychiatric:         Mood and Affect: Mood normal.         Behavior: Behavior normal.         Assessment/Plan   Assessment & Plan  Acute bronchitis, unspecified organism    Orders:    doxycycline (Vibramycin) 100 mg capsule; Take 1 capsule (100 mg) by mouth  2 times a day for 7 days. Take with at least 8 ounces (large glass) of water, do not lie down for 30 minutes after    guaiFENesin (Mucinex) 600 mg 12 hr tablet; Take 1 tablet (600 mg) by mouth 2 times a day as needed for cough. Do not crush, chew, or split.    Since his cough has not been improving at 1 week, we will treat with antibiotic.  Treat with doxycycline twice daily x 7 days.  He is also requesting Mucinex to help with cough.  Follow-up or call in 5 to 7 days if symptoms do not resolve with antibiotics

## 2024-09-30 NOTE — PROGRESS NOTES
Subjective   Patient ID: Facundo Thomas is a 24 y.o. male who presents for URI.    HPI     Review of Systems    Objective   /85   Pulse 74   Temp 36.1 °C (97 °F)   Wt 81.2 kg (179 lb)   SpO2 96%   BMI 28.04 kg/m²     Physical Exam    Assessment/Plan   Assessment & Plan  Acute bronchitis, unspecified organism    Orders:    doxycycline (Vibramycin) 100 mg capsule; Take 1 capsule (100 mg) by mouth 2 times a day for 7 days. Take with at least 8 ounces (large glass) of water, do not lie down for 30 minutes after    guaiFENesin (Mucinex) 600 mg 12 hr tablet; Take 1 tablet (600 mg) by mouth 2 times a day as needed for cough. Do not crush, chew, or split.

## 2024-10-10 ENCOUNTER — OFFICE VISIT (OUTPATIENT)
Dept: PRIMARY CARE | Facility: CLINIC | Age: 24
End: 2024-10-10
Payer: COMMERCIAL

## 2024-10-10 ENCOUNTER — TELEPHONE (OUTPATIENT)
Dept: PRIMARY CARE | Facility: CLINIC | Age: 24
End: 2024-10-10

## 2024-10-10 VITALS
SYSTOLIC BLOOD PRESSURE: 128 MMHG | OXYGEN SATURATION: 97 % | BODY MASS INDEX: 28.04 KG/M2 | WEIGHT: 179 LBS | TEMPERATURE: 98.7 F | HEART RATE: 73 BPM | DIASTOLIC BLOOD PRESSURE: 85 MMHG

## 2024-10-10 DIAGNOSIS — J20.9 ACUTE BRONCHITIS, UNSPECIFIED ORGANISM: Primary | ICD-10-CM

## 2024-10-10 PROCEDURE — 99213 OFFICE O/P EST LOW 20 MIN: CPT | Performed by: FAMILY MEDICINE

## 2024-10-10 RX ORDER — FLUTICASONE PROPIONATE 50 MCG
2 SPRAY, SUSPENSION (ML) NASAL DAILY
Qty: 16 G | Refills: 1 | Status: SHIPPED | OUTPATIENT
Start: 2024-10-10

## 2024-10-10 ASSESSMENT — ENCOUNTER SYMPTOMS
WHEEZING: 0
SORE THROAT: 0
RHINORRHEA: 0
SINUS PRESSURE: 1
FEVER: 0
COUGH: 1

## 2024-10-10 NOTE — PROGRESS NOTES
Subjective   Patient ID: Facundo Thomas is a 24 y.o. male who presents for No chief complaint on file..    cough    Cough  Pertinent negatives include no ear pain, fever, rhinorrhea, sore throat or wheezing.        Here today for follow-up on acute bronchitis  He was seen on 9/30/2024 with a 1 week history of symptoms including wet cough, nasal congestion, sinus pressure and clear nasal drainage.  At that time we treated him with doxycycline 100 mg twice daily x 7 days  He states that overall he is feeling better, but still has a cough and sinus pressure.  Nasal congestion and drainage resolved.  Cough is now dry and usually worse in the morning.  He is still having frontal sinus pressure.  Not currently taking anything to treat symptoms.  He did miss work today due to his symptoms and needs a note        Review of Systems   Constitutional:  Negative for fever.   HENT:  Positive for sinus pressure. Negative for congestion, ear pain, rhinorrhea and sore throat.    Respiratory:  Positive for cough. Negative for wheezing.        Objective   /85   Pulse 73   Temp 37.1 °C (98.7 °F) (Temporal)   Wt 81.2 kg (179 lb)   SpO2 97%   BMI 28.04 kg/m²     Physical Exam  Vitals reviewed.   Constitutional:       General: He is not in acute distress.     Appearance: Normal appearance.   HENT:      Head: Normocephalic.      Right Ear: Tympanic membrane, ear canal and external ear normal.      Left Ear: Tympanic membrane, ear canal and external ear normal.      Nose: Nose normal.      Comments: Frontal sinus tenderness to percussion     Mouth/Throat:      Mouth: Mucous membranes are moist.      Pharynx: No oropharyngeal exudate or posterior oropharyngeal erythema.   Eyes:      Conjunctiva/sclera: Conjunctivae normal.   Cardiovascular:      Rate and Rhythm: Normal rate and regular rhythm.      Heart sounds: Normal heart sounds.   Pulmonary:      Effort: Pulmonary effort is normal.      Breath sounds: Normal breath sounds.    Lymphadenopathy:      Cervical: No cervical adenopathy.   Skin:     Findings: No rash.   Neurological:      Mental Status: He is alert.   Psychiatric:         Mood and Affect: Mood normal.         Behavior: Behavior normal.         Assessment/Plan   Assessment & Plan  Acute bronchitis, unspecified organism    Orders:    fluticasone (Flonase) 50 mcg/actuation nasal spray; Administer 2 sprays into each nostril once daily. Shake gently. Before first use, prime pump. After use, clean tip and replace cap.    He recently completed a 7-day course of doxycycline, and lungs are clear to auscultation bilaterally today, so I do not feel that any additional antibiotics are needed.  His cough and sinus pressure are most likely postinfectious.  We will start Flonase daily and recommend follow-up in the next week if symptoms do not completely resolve

## 2024-10-10 NOTE — LETTER
October 10, 2024     Patient: Facundo Thomas   YOB: 2000   Date of Visit: 10/10/2024       To Whom It May Concern:    Facundo Thomas was seen in my clinic on 10/10/2024 at . Please excuse Facundo for his absence from work on this day to make the appointment.    If you have any questions or concerns, please don't hesitate to call.         Sincerely,         Дмитрий Bill, DO

## 2024-10-16 ENCOUNTER — TELEPHONE (OUTPATIENT)
Dept: PRIMARY CARE | Facility: CLINIC | Age: 24
End: 2024-10-16
Payer: COMMERCIAL

## 2024-10-16 NOTE — TELEPHONE ENCOUNTER
Patient is wanting an order for an xray for his elbow    He states he fell yesterday and is in extreme pain and can hardly move his arm      Please Advise    [phone]

## 2024-10-17 ENCOUNTER — TELEPHONE (OUTPATIENT)
Dept: PRIMARY CARE | Facility: CLINIC | Age: 24
End: 2024-10-17

## 2024-10-17 ENCOUNTER — OFFICE VISIT (OUTPATIENT)
Dept: PRIMARY CARE | Facility: CLINIC | Age: 24
End: 2024-10-17
Payer: COMMERCIAL

## 2024-10-17 VITALS — SYSTOLIC BLOOD PRESSURE: 126 MMHG | DIASTOLIC BLOOD PRESSURE: 82 MMHG | HEART RATE: 81 BPM | OXYGEN SATURATION: 97 %

## 2024-10-17 DIAGNOSIS — S50.02XA CONTUSION OF LEFT ELBOW, INITIAL ENCOUNTER: Primary | ICD-10-CM

## 2024-10-17 PROCEDURE — 99213 OFFICE O/P EST LOW 20 MIN: CPT | Performed by: FAMILY MEDICINE

## 2024-10-17 ASSESSMENT — ENCOUNTER SYMPTOMS
FEVER: 0
ARTHRALGIAS: 1

## 2024-10-17 NOTE — PROGRESS NOTES
Subjective   Patient ID: Facundo Thomas is a 24 y.o. male who presents for Elbow Injury (10/15/2024 left elbow - pain 4/10 aching /Slipped off rug and hit hard wood floor. ).    HPI   Here today for left elbow pain  This occurred 2 days ago.  He slipped on the carpet and hit his left elbow against the floor.  Since that he has had pain which is greatest in his olecranon area.  This was an 8 out of 10 intensity pain yesterday, but has improved to 4 out of 10 today.  He has been treating with Ace wrap and ibuprofen.  No history of any elbow problems in the past    Review of Systems   Constitutional:  Negative for fever.   Musculoskeletal:  Positive for arthralgias.       Objective   /82   Pulse 81   SpO2 97%     Physical Exam  Constitutional:       General: He is not in acute distress.     Appearance: Normal appearance. He is well-developed.   Pulmonary:      Effort: Pulmonary effort is normal.   Musculoskeletal:         General: Tenderness present.      Comments: Left elbow exam: Tenderness directly over the olecranon.  There is no tenderness involving the lateral or medial epicondyles.  Elbow strength is intact.  He is able to fully extend his elbow.  There is no bruising or significant swelling or erythema   Skin:     Findings: No rash.   Neurological:      Mental Status: He is alert.   Psychiatric:         Mood and Affect: Mood normal.         Behavior: Behavior normal.         Assessment/Plan   Assessment & Plan  Contusion of left elbow, initial encounter    Orders:    XR elbow left 3+ views; Future    He presents today with persistent left elbow pain following an injury 2 days ago.  Will obtain an x-ray to confirm there is no fracture present.  If x-ray is negative, this is likely an elbow contusion.  Recommended continuing Ace wrap, ice, and NSAIDs and follow-up in 1 to 2 weeks if not resolving

## 2024-10-17 NOTE — LETTER
October 17, 2024     Patient: Facundo Thomas   YOB: 2000   Date of Visit: 10/17/2024       To Whom It May Concern:    Facundo Thomas was seen in my clinic on 10/17/2024 at . Please excuse Facundo for his absence from work on this day to make the appointment.    If you have any questions or concerns, please don't hesitate to call.         Sincerely,         Дмтирий Bill, DO

## 2024-11-05 ENCOUNTER — TELEPHONE (OUTPATIENT)
Dept: PRIMARY CARE | Facility: CLINIC | Age: 24
End: 2024-11-05
Payer: COMMERCIAL

## 2024-11-06 ENCOUNTER — OFFICE VISIT (OUTPATIENT)
Dept: PRIMARY CARE | Facility: CLINIC | Age: 24
End: 2024-11-06
Payer: COMMERCIAL

## 2024-11-06 VITALS
HEART RATE: 84 BPM | OXYGEN SATURATION: 97 % | TEMPERATURE: 97.1 F | BODY MASS INDEX: 27.72 KG/M2 | WEIGHT: 177 LBS | SYSTOLIC BLOOD PRESSURE: 119 MMHG | DIASTOLIC BLOOD PRESSURE: 71 MMHG

## 2024-11-06 DIAGNOSIS — R25.2 LEG CRAMPS: Primary | ICD-10-CM

## 2024-11-06 PROCEDURE — 99213 OFFICE O/P EST LOW 20 MIN: CPT | Performed by: FAMILY MEDICINE

## 2024-11-06 RX ORDER — IBUPROFEN 800 MG/1
800 TABLET ORAL
Qty: 30 TABLET | Refills: 0 | Status: SHIPPED | OUTPATIENT
Start: 2024-11-06

## 2024-11-06 ASSESSMENT — ENCOUNTER SYMPTOMS
COUGH: 0
FEVER: 0
MYALGIAS: 1

## 2024-11-06 NOTE — PROGRESS NOTES
Subjective   Patient ID: Facundo Thomas is a 24 y.o. male who presents for Spasms (Bilateral leg and pt also had some left arm discomfort /Pt had some left leg numbness on Monday but now has resolved. ).    HPI     Here to discuss leg cramping  This started 2 days ago.  Symptoms started suddenly when he was at work.  He was doing a lot of heavy lifting at that time  Pain was constant when it had started and lasted all day.  This was a 8 out of 10 spasm and cramping pain involving both legs.  It was most noticeable in his quad area  The pain improved yesterday and was more intermittent and was a 6 out of 10 intensity  Today the pain has been getting better  He has a history of chronic low back pain, and has not had any worsening back pain since symptoms started  Nothing to treat  No numbness or weakness in legs        Review of Systems   Constitutional:  Negative for fever.   Respiratory:  Negative for cough.    Musculoskeletal:  Positive for myalgias.       Objective   /71   Pulse 84   Temp 36.2 °C (97.1 °F) (Temporal)   Wt 80.3 kg (177 lb)   SpO2 97%   BMI 27.72 kg/m²     Physical Exam  Vitals reviewed.   Constitutional:       General: He is not in acute distress.     Appearance: Normal appearance. He is well-developed.   HENT:      Head: Normocephalic.   Eyes:      Conjunctiva/sclera: Conjunctivae normal.   Cardiovascular:      Rate and Rhythm: Normal rate and regular rhythm.      Heart sounds: Normal heart sounds.   Pulmonary:      Effort: Pulmonary effort is normal.      Breath sounds: Normal breath sounds.   Musculoskeletal:         General: No tenderness.      Right lower leg: No edema.      Left lower leg: No edema.      Comments: No tenderness along the quad muscles.  Lower extremity strength is plus 5 out of 5 bilaterally.  Patellar reflexes plus 2 out of 4 bilaterally   Skin:     Findings: No rash.   Neurological:      Mental Status: He is alert.   Psychiatric:         Mood and Affect: Mood  normal.         Behavior: Behavior normal.         Assessment/Plan   Assessment & Plan  Leg cramps    Orders:    ibuprofen 800 mg tablet; Take 1 tablet (800 mg) by mouth every 6 hours during the day.    He presents today with bilateral leg cramps/spasms which had started 2 days ago.  This has been improving and may have been due to overuse.  Recommend heat as needed, stretching, and will start ibuprofen as needed.  Follow-up in the next 1 to 2 weeks if this has not resolved completely

## 2024-11-07 ENCOUNTER — TELEPHONE (OUTPATIENT)
Dept: PRIMARY CARE | Facility: CLINIC | Age: 24
End: 2024-11-07
Payer: COMMERCIAL

## 2024-11-07 DIAGNOSIS — R25.2 LEG CRAMPS: Primary | ICD-10-CM

## 2024-11-07 RX ORDER — CYCLOBENZAPRINE HCL 5 MG
5 TABLET ORAL NIGHTLY PRN
Qty: 15 TABLET | Refills: 0 | Status: SHIPPED | OUTPATIENT
Start: 2024-11-07

## 2024-11-07 NOTE — TELEPHONE ENCOUNTER
Patient called stating that he had cut hisself on a sanjeev metal object at work.  He wants to know if he is up to date on his tetanus shot.  Please advise phone #[phone].

## 2024-11-19 ENCOUNTER — TELEPHONE (OUTPATIENT)
Dept: PRIMARY CARE | Facility: CLINIC | Age: 24
End: 2024-11-19
Payer: COMMERCIAL

## 2024-11-19 NOTE — TELEPHONE ENCOUNTER
Sinus pressure for the past 3 days  Flonase and Tylenol not working    He is wondering what he should do next    Please Advise    [phone]

## 2024-11-20 ENCOUNTER — HOSPITAL ENCOUNTER (EMERGENCY)
Facility: HOSPITAL | Age: 24
Discharge: HOME | End: 2024-11-20
Attending: STUDENT IN AN ORGANIZED HEALTH CARE EDUCATION/TRAINING PROGRAM
Payer: COMMERCIAL

## 2024-11-20 ENCOUNTER — APPOINTMENT (OUTPATIENT)
Dept: RADIOLOGY | Facility: HOSPITAL | Age: 24
End: 2024-11-20
Payer: COMMERCIAL

## 2024-11-20 VITALS
TEMPERATURE: 97.3 F | WEIGHT: 180 LBS | RESPIRATION RATE: 16 BRPM | SYSTOLIC BLOOD PRESSURE: 135 MMHG | HEART RATE: 73 BPM | BODY MASS INDEX: 28.25 KG/M2 | HEIGHT: 67 IN | OXYGEN SATURATION: 96 % | DIASTOLIC BLOOD PRESSURE: 71 MMHG

## 2024-11-20 DIAGNOSIS — R51.9 ACUTE NONINTRACTABLE HEADACHE, UNSPECIFIED HEADACHE TYPE: Primary | ICD-10-CM

## 2024-11-20 PROCEDURE — 70450 CT HEAD/BRAIN W/O DYE: CPT | Performed by: RADIOLOGY

## 2024-11-20 PROCEDURE — 70450 CT HEAD/BRAIN W/O DYE: CPT

## 2024-11-20 PROCEDURE — 2500000001 HC RX 250 WO HCPCS SELF ADMINISTERED DRUGS (ALT 637 FOR MEDICARE OP): Performed by: STUDENT IN AN ORGANIZED HEALTH CARE EDUCATION/TRAINING PROGRAM

## 2024-11-20 PROCEDURE — 99284 EMERGENCY DEPT VISIT MOD MDM: CPT | Mod: 25

## 2024-11-20 RX ORDER — ACETAMINOPHEN 325 MG/1
650 TABLET ORAL ONCE
Status: COMPLETED | OUTPATIENT
Start: 2024-11-20 | End: 2024-11-20

## 2024-11-20 ASSESSMENT — LIFESTYLE VARIABLES
EVER FELT BAD OR GUILTY ABOUT YOUR DRINKING: NO
TOTAL SCORE: 0
HAVE PEOPLE ANNOYED YOU BY CRITICIZING YOUR DRINKING: NO
EVER HAD A DRINK FIRST THING IN THE MORNING TO STEADY YOUR NERVES TO GET RID OF A HANGOVER: NO
HAVE YOU EVER FELT YOU SHOULD CUT DOWN ON YOUR DRINKING: NO

## 2024-11-20 ASSESSMENT — PAIN DESCRIPTION - ORIENTATION: ORIENTATION: LEFT

## 2024-11-20 ASSESSMENT — PAIN DESCRIPTION - PAIN TYPE
TYPE: ACUTE PAIN
TYPE: ACUTE PAIN

## 2024-11-20 ASSESSMENT — PAIN DESCRIPTION - LOCATION
LOCATION: HEAD

## 2024-11-20 ASSESSMENT — PAIN SCALES - GENERAL
PAINLEVEL_OUTOF10: 2
PAINLEVEL_OUTOF10: 4
PAINLEVEL_OUTOF10: 8
PAINLEVEL_OUTOF10: 8

## 2024-11-20 ASSESSMENT — PAIN DESCRIPTION - DESCRIPTORS: DESCRIPTORS: HEADACHE;PRESSURE

## 2024-11-20 ASSESSMENT — PAIN - FUNCTIONAL ASSESSMENT: PAIN_FUNCTIONAL_ASSESSMENT: 0-10

## 2024-11-20 NOTE — DISCHARGE INSTRUCTIONS
Call to schedule follow-up appointment with your primary care physician within next 24 to 48 hours.  Take over-the-counter Motrin and Tylenol as needed for pain according to packaging directions.  Return to the emergency department immediately for any new or worsening symptoms such as worsening headache, neck stiffness, vision changes, balance problems, weakness in your arms or legs, numbness or tingling in your arms or legs, facial droop, confusion, or if you pass out.

## 2024-11-20 NOTE — Clinical Note
Facundo Thomas was seen and treated in our emergency department on 11/20/2024.  He may return to work on 11/21/2024.       If you have any questions or concerns, please don't hesitate to call.      Bubba Porter, DO

## 2024-11-20 NOTE — ED PROVIDER NOTES
EMERGENCY DEPARTMENT ENCOUNTER      Pt Name: Facundo Thomas  MRN: [id number]  Birthdate 2000  Date of evaluation: 11/20/2024  Provider: Bubba Porter DO    CHIEF COMPLAINT       Chief Complaint   Patient presents with    Headache     Pt c/o a headache ie pressure in L side of head for 3 days that is not responding to otc's. Pt states he has a hx of headaches but never diagnosed with migraines and states they normally don't last this long. Pt denies any other sx and denies visual changes at this time. No distress noted.          HISTORY OF PRESENT ILLNESS    HPI    24-year-old male presenting to the Emergency Department for evaluation of headache.  Patient states for the past 4 days he has had a headache which he was prescribed as throbbing/pressure in his left forehead for the past 3 days but improves with Tylenol but does not completely resolve like his prior headaches.  States he does have a history of headaches that usually last about a day and improved with Tylenol but he does not have a formal diagnosis of migraines.  Denies any photophobia, phonophobia, nausea, vomiting, dizziness, ataxia, facial droop, upper or lower extremity weakness, vertigo, sensory changes or vision changes.  No neck stiffness.  Patient has not fallen or hit his head.  Grandmother did have a history of a subarachnoid hemorrhage secondary to berry aneurysm but no history of berry aneurysms in first-degree relatives.  Headache does not worsen when lying flat and he has not been sick with URI or cough.    Nursing Notes were reviewed.    PAST MEDICAL HISTORY     Past Medical History:   Diagnosis Date    Acute bronchitis 09/18/2024    Allergic     Ankle pain 09/18/2024    Asthma     Contact with and (suspected) exposure to covid-19 02/21/2023    Cough, unspecified 12/09/2013    Cough    Cough, unspecified 08/22/2022    Depression     Diarrhea 09/18/2024    Disease due to severe acute respiratory syndrome coronavirus 2 (SARS-CoV-2)  08/22/2022    Comment on above: COVID + WEAKNESS     LOW BACK PAIN, NKI COVID + 2 MONTHS AGO      Eczema     Generalized pain 09/18/2024    Nasal congestion 09/18/2024    Nausea and vomiting 01/22/2019    Comment on above: VOMITING      Pain of hand 09/18/2024    Pain, unspecified 08/19/2022    Personal history of other diseases of the respiratory system 03/09/2016    History of asthma    Right lower quadrant abdominal pain 05/09/2018    Sore throat 10/04/2018    Comment on above: Added by Problem List Migration; 2013-3-29; Moved to [facility] Nov 24 2013 4:44PM;      Tinea corporis 09/18/2024         SURGICAL HISTORY       Past Surgical History:   Procedure Laterality Date    CIRCUMCISION, PRIMARY  10/10/2013    Elective Circumcision    OTHER SURGICAL HISTORY  12/10/2019    Oral surgery    WISDOM TOOTH EXTRACTION           CURRENT MEDICATIONS       Discharge Medication List as of 11/20/2024 11:37 AM        CONTINUE these medications which have NOT CHANGED    Details   albuterol 90 mcg/actuation inhaler Inhale 2 puffs every 4 hours if needed for wheezing or shortness of breath., Starting Fri 3/22/2024, Normal      ARIPiprazole (Abilify) 10 mg tablet Take 1 tablet (10 mg) by mouth once daily., Starting Tue 4/23/2024, Normal      cetirizine (ZyrTEC) 10 mg tablet Take 1 tablet (10 mg) by mouth once daily as needed for allergies., Starting Tue 6/25/2024, Normal      !! cyclobenzaprine (Flexeril) 5 mg tablet Take 1 tablet (5 mg) by mouth as needed at bedtime for muscle spasms., Starting Fri 5/17/2024, Normal      !! cyclobenzaprine (Flexeril) 5 mg tablet Take 1 tablet (5 mg) by mouth as needed at bedtime for muscle spasms., Starting Thu 11/7/2024, Normal      EPINEPHrine 0.3 mg/0.3 mL injection syringe Inject 0.3 mL (0.3 mg) into the muscle 1 time for 1 dose., Starting Fri 3/22/2024, Normal      fluticasone (Flonase) 50 mcg/actuation nasal spray Administer 2 sprays into each nostril once daily. Shake gently. Before first  use, prime pump. After use, clean tip and replace cap., Starting Thu 10/10/2024, Normal      guaiFENesin (Mucinex) 600 mg 12 hr tablet Take 1 tablet (600 mg) by mouth 2 times a day as needed for cough. Do not crush, chew, or split., Starting Mon 9/30/2024, Normal      ibuprofen 800 mg tablet Take 1 tablet (800 mg) by mouth every 6 hours during the day., Starting Wed 11/6/2024, Normal      ondansetron (Zofran) 4 mg tablet Take 1 tablet (4 mg) by mouth every 8 hours if needed for nausea., Starting Wed 5/29/2024, Normal       !! - Potential duplicate medications found. Please discuss with provider.          ALLERGIES     Hazelnut, Amoxicillin-pot clavulanate, Cephalosporins, Gentamicin, Penicillins, Red dye, and Acetaminophen    FAMILY HISTORY       Family History   Problem Relation Name Age of Onset    Diabetes Father Erika sarah           SOCIAL HISTORY       Social History     Socioeconomic History    Marital status:    Tobacco Use    Smoking status: Former     Current packs/day: 0.25     Types: Cigarettes    Smokeless tobacco: Current   Substance and Sexual Activity    Alcohol use: Not Currently    Drug use: Never     Social Drivers of Health     Financial Resource Strain: Low Risk  (3/8/2020)    Received from [facility]    Overall Financial Resource Strain (CARDIA)     Difficulty of Paying Living Expenses: Not very hard   Food Insecurity: No Food Insecurity (3/8/2020)    Received from [facility]    Hunger Vital Sign     Worried About Running Out of Food in the Last Year: Never true     Ran Out of Food in the Last Year: Never true   Transportation Needs: No Transportation Needs (3/8/2020)    Received from [facility]    PRAPARE - Transportation     Lack of Transportation (Medical): No     Lack of Transportation (Non-Medical): No   Physical Activity: Insufficiently Active (3/8/2020)    Received from [facility]     Exercise Vital Sign     Days of Exercise per Week: 5 days     Minutes of Exercise per Session: 10 min   Stress: No Stress Concern Present (3/8/2020)    Received from [facility]    [employer] [place] [employer] - Occupational Stress Questionnaire     Feeling of Stress : Not at all   Social Connections: Moderately Isolated (3/8/2020)    Received from [facility]    Social Connection and Isolation Panel [NHANES]     Frequency of Communication with Friends and Family: More than three times a week     Frequency of Social Gatherings with Friends and Family: More than three times a week     Attends Religion Services: Never     Active Member of Clubs or Organizations: No     Attends Club or Organization Meetings: Never     Marital Status: Living with partner       SCREENINGS                        PHYSICAL EXAM    (up to 7 for level 4, 8 or more for level 5)     ED Triage Vitals [11/20/24 0724]   Temperature Heart Rate Respirations BP   36.3 °C (97.3 °F) 94 18 145/80      Pulse Ox Temp Source Heart Rate Source Patient Position   98 % Temporal Monitor Sitting      BP Location FiO2 (%)     Right arm --       Physical Exam  Vitals and nursing note reviewed.   Constitutional:       General: He is not in acute distress.     Appearance: He is well-developed. He is not ill-appearing, toxic-appearing or diaphoretic.   HENT:      Head: Normocephalic and atraumatic.      Mouth/Throat:      Mouth: Mucous membranes are moist.      Pharynx: Oropharynx is clear.   Eyes:      General: No visual field deficit or scleral icterus.     Extraocular Movements: Extraocular movements intact.      Right eye: Normal extraocular motion and no nystagmus.      Left eye: Normal extraocular motion.      Pupils: Pupils are equal, round, and reactive to light.   Neck:      Meningeal: Brudzinski's sign and Kernig's sign absent.   Cardiovascular:      Rate and Rhythm: Normal rate and regular  rhythm.      Heart sounds: Normal heart sounds.   Pulmonary:      Effort: Pulmonary effort is normal.      Breath sounds: Normal breath sounds.   Abdominal:      General: Bowel sounds are normal.      Palpations: Abdomen is soft.   Musculoskeletal:         General: Normal range of motion.      Cervical back: Normal range of motion and neck supple. No rigidity.   Skin:     General: Skin is warm and dry.   Neurological:      Mental Status: He is alert and oriented to person, place, and time.      Cranial Nerves: No cranial nerve deficit, dysarthria or facial asymmetry.      Sensory: No sensory deficit.      Motor: No weakness.      Coordination: Romberg sign negative. Coordination normal.      Gait: Gait normal.   Psychiatric:         Mood and Affect: Mood normal.         Behavior: Behavior normal.          DIAGNOSTIC RESULTS     LABS:  Labs Reviewed - No data to display    All other labs were within normal range or not returned as of this dictation.    Imaging  CT head wo IV contrast   Final Result   No acute intracranial pathologic findings are identified.             MACRO:   none        Signed by: Matias Marie 11/20/2024 10:59 AM   Dictation workstation:   [id number]           Procedures  Procedures     EMERGENCY DEPARTMENT COURSE/MDM:     ED Course as of 11/20/24 1805 Wed Nov 20, 2024   0924 Well-appearing, comfortable male presenting for 4 days of gradual onset headache with no neurological deficits.  He has been taking Tylenol and ibuprofen with relief.  It was not sudden in onset.  He is GCS 15 with a normal neurological exam.  He denies any recent viral illnesses, eye symptoms, change in pain with positional changes, confusion, weakness, or trauma. [CR]      ED Course User Index  [CR] Gopal De León MD         Diagnoses as of 11/20/24 1805   Acute nonintractable headache, unspecified headache type        Medical Decision Making    24-year-old male presenting to the Emergency Department for evaluation of  headache.  Patient states for the past 4 days he has had a headache which he was prescribed as throbbing/pressure in his left forehead for the past 3 days.  Hemodynamically stable, no acute distress, nontoxic-appearing, afebrile.  No abnormalities noted on physical exam including neurologic exam.  Given patient's headache is different in character from prior headache CT head without IV contrast ordered though I have a low clinical suspicion for intracranial hemorrhage or malignancy.  650 mg of Tylenol ordered for pain.    CT head shows no evidence of acute pathology and patient's pain improved from an 8 out of 10 to a 2 out of 10 after Tylenol.  Patient deemed safe for discharge for outpatient follow-up with his primary care physician with strict return precautions.    Patient and or family in agreement and understanding of treatment plan.  All questions answered.      I reviewed the case with the attending ED physician. The attending ED physician agrees with the plan. Patient and/or patient´s representative was counseled regarding labs, imaging, likely diagnosis, and plan. All questions were answered.    ED Medications administered this visit:    Medications   acetaminophen (Tylenol) tablet 650 mg (650 mg oral Given 11/20/24 0928)       New Prescriptions from this visit:    Discharge Medication List as of 11/20/2024 11:37 AM          Follow-up:  Дмитрий Bill DO  [address] [id number] [phone]    Call in 1 day          Final Impression:   1. Acute nonintractable headache, unspecified headache type          (Please note that portions of this note were completed with a voice recognition program.  Efforts were made to edit the dictations but occasionally words are mis-transcribed.)     Bubba Porter DO  Resident  11/20/24 5174

## 2024-11-25 ENCOUNTER — OFFICE VISIT (OUTPATIENT)
Dept: PRIMARY CARE | Facility: CLINIC | Age: 24
End: 2024-11-25
Payer: COMMERCIAL

## 2024-11-25 VITALS
SYSTOLIC BLOOD PRESSURE: 122 MMHG | HEART RATE: 88 BPM | TEMPERATURE: 97 F | BODY MASS INDEX: 28.04 KG/M2 | DIASTOLIC BLOOD PRESSURE: 80 MMHG | RESPIRATION RATE: 16 BRPM | OXYGEN SATURATION: 97 % | WEIGHT: 179 LBS

## 2024-11-25 DIAGNOSIS — J01.90 ACUTE BACTERIAL SINUSITIS: Primary | ICD-10-CM

## 2024-11-25 DIAGNOSIS — B96.89 ACUTE BACTERIAL SINUSITIS: Primary | ICD-10-CM

## 2024-11-25 DIAGNOSIS — J02.9 SORE THROAT: ICD-10-CM

## 2024-11-25 LAB
POC RAPID INFLUENZA A: NEGATIVE
POC RAPID INFLUENZA B: NEGATIVE
POC RAPID STREP: NEGATIVE
POC SARS-COV-2 AG BINAX: NORMAL

## 2024-11-25 PROCEDURE — 87880 STREP A ASSAY W/OPTIC: CPT | Performed by: NURSE PRACTITIONER

## 2024-11-25 PROCEDURE — 87804 INFLUENZA ASSAY W/OPTIC: CPT | Performed by: NURSE PRACTITIONER

## 2024-11-25 PROCEDURE — 99214 OFFICE O/P EST MOD 30 MIN: CPT | Performed by: NURSE PRACTITIONER

## 2024-11-25 PROCEDURE — 87635 SARS-COV-2 COVID-19 AMP PRB: CPT

## 2024-11-25 PROCEDURE — 87811 SARS-COV-2 COVID19 W/OPTIC: CPT | Performed by: NURSE PRACTITIONER

## 2024-11-25 RX ORDER — AZITHROMYCIN 250 MG/1
TABLET, FILM COATED ORAL
Qty: 6 TABLET | Refills: 0 | Status: SHIPPED | OUTPATIENT
Start: 2024-11-25 | End: 2024-11-30

## 2024-11-25 RX ORDER — BENZONATATE 100 MG/1
100 CAPSULE ORAL 3 TIMES DAILY PRN
Qty: 42 CAPSULE | Refills: 0 | Status: SHIPPED | OUTPATIENT
Start: 2024-11-25 | End: 2024-12-09

## 2024-11-25 ASSESSMENT — ENCOUNTER SYMPTOMS
SINUS PRESSURE: 1
COUGH: 1
HEADACHES: 1
FATIGUE: 1
SORE THROAT: 1
CONSTIPATION: 0
FEVER: 0
DIARRHEA: 0
NAUSEA: 1
SHORTNESS OF BREATH: 1
SINUS PAIN: 1
RHINORRHEA: 1
VOMITING: 1
CHILLS: 1
WHEEZING: 1

## 2024-11-25 NOTE — PROGRESS NOTES
Subjective   Patient ID: Facundo Thomas is a 24 y.o. male who presents for Cough.  Pt taking Ibuprofen for symptoms, last taken [id number] 4.5 hours agto;   PT swabbed for rapid strep, flu, and covid. PCRS done.    Cough  Episode onset: Started 4 days ago, worse overnight. The problem has been gradually worsening. The cough is Productive of sputum. Associated symptoms include chills, headaches, nasal congestion, rhinorrhea, a sore throat, shortness of breath and wheezing. Pertinent negatives include no chest pain, ear pain or fever. Associated symptoms comments: vomiting.    Review of Systems   Constitutional:  Positive for chills and fatigue. Negative for fever.   HENT:  Positive for rhinorrhea, sinus pressure, sinus pain and sore throat. Negative for ear pain.    Respiratory:  Positive for cough, shortness of breath and wheezing.    Cardiovascular:  Negative for chest pain.   Gastrointestinal:  Positive for nausea and vomiting. Negative for constipation and diarrhea.   Neurological:  Positive for headaches.   Objective   /80   Pulse 88   Temp 36.1 °C (97 °F)   Resp 16   Wt 81.2 kg (179 lb)   SpO2 97%   BMI 28.04 kg/m²     Physical Exam  Vitals reviewed.   Constitutional:       General: He is not in acute distress.     Appearance: Normal appearance. He is ill-appearing. He is not toxic-appearing.   HENT:      Head: Normocephalic.      Nose: Congestion present.      Right Turbinates: Swollen. Not enlarged.      Left Turbinates: Swollen. Not enlarged.      Right Sinus: Frontal sinus tenderness present. No maxillary sinus tenderness.      Left Sinus: Frontal sinus tenderness present. No maxillary sinus tenderness.   Eyes:      Conjunctiva/sclera: Conjunctivae normal.   Cardiovascular:      Rate and Rhythm: Normal rate and regular rhythm.      Pulses: Normal pulses.      Heart sounds: No murmur heard.  Pulmonary:      Effort: Pulmonary effort is normal.      Breath sounds: Normal breath sounds. No wheezing.       Comments: Harsh cough, non-productive  Abdominal:      General: Bowel sounds are normal.      Palpations: Abdomen is soft.   Musculoskeletal:      Cervical back: Neck supple.   Lymphadenopathy:      Cervical: Cervical adenopathy present.   Skin:     General: Skin is warm and dry.   Neurological:      General: No focal deficit present.      Mental Status: He is alert.   Psychiatric:         Mood and Affect: Mood normal.         Thought Content: Thought content normal.     Assessment/Plan   1. Acute bacterial sinusitis  azithromycin (Zithromax) 250 mg tablet    benzonatate (Tessalon) 100 mg capsule      2. Sore throat  POCT Rapid Strep A manually resulted    POCT BinaxNOW Covid-19 Ag Card manually resulted    POCT Influenza A/B manually resulted      3. Viral upper respiratory tract infection  Sars-CoV-2 PCR       Increase oral fluids/water, at least eight 8-oz glasses/day.  Get plenty of rest.  Cepacol lozenges and/or Chloraseptic spray PRN for sore throat. Salt water gargle or broth for comfort.  Alternate Tylenol/Ibuprofen PRN for body aches and/or fever  Saline nasal spray PRN for rhinitis.  Guaifenessin/Guaifenissin DM PRN for cough

## 2024-11-26 LAB — SARS-COV-2 RNA RESP QL NAA+PROBE: NOT DETECTED

## 2024-12-03 ENCOUNTER — HOSPITAL ENCOUNTER (EMERGENCY)
Age: 24
Discharge: HOME | End: 2024-12-03
Payer: COMMERCIAL

## 2024-12-03 ENCOUNTER — APPOINTMENT (OUTPATIENT)
Dept: GENERAL RADIOLOGY | Age: 24
End: 2024-12-03
Payer: COMMERCIAL

## 2024-12-03 VITALS
OXYGEN SATURATION: 97 % | DIASTOLIC BLOOD PRESSURE: 85 MMHG | RESPIRATION RATE: 16 BRPM | HEART RATE: 91 BPM | BODY MASS INDEX: 28.25 KG/M2 | WEIGHT: 180 LBS | HEIGHT: 67 IN | TEMPERATURE: 97.9 F | SYSTOLIC BLOOD PRESSURE: 131 MMHG

## 2024-12-03 DIAGNOSIS — B34.8 RHINOVIRUS: Primary | ICD-10-CM

## 2024-12-03 LAB
B PARAP IS1001 DNA NPH QL NAA+NON-PROBE: NOT DETECTED
B PERT.PT PRMT NPH QL NAA+NON-PROBE: NOT DETECTED
C PNEUM DNA NPH QL NAA+NON-PROBE: NOT DETECTED
EKG ATRIAL RATE: 90 BPM
EKG P AXIS: 46 DEGREES
EKG P-R INTERVAL: 130 MS
EKG Q-T INTERVAL: 336 MS
EKG QRS DURATION: 86 MS
EKG QTC CALCULATION (BAZETT): 411 MS
EKG R AXIS: 13 DEGREES
EKG T AXIS: 19 DEGREES
EKG VENTRICULAR RATE: 90 BPM
FLUAV RNA NPH QL NAA+NON-PROBE: NOT DETECTED
FLUBV RNA NPH QL NAA+NON-PROBE: NOT DETECTED
HADV DNA NPH QL NAA+NON-PROBE: NOT DETECTED
HCOV 229E RNA NPH QL NAA+NON-PROBE: NOT DETECTED
HCOV HKU1 RNA NPH QL NAA+NON-PROBE: NOT DETECTED
HCOV NL63 RNA NPH QL NAA+NON-PROBE: NOT DETECTED
HCOV OC43 RNA NPH QL NAA+NON-PROBE: NOT DETECTED
HMPV RNA NPH QL NAA+NON-PROBE: NOT DETECTED
HPIV1 RNA NPH QL NAA+NON-PROBE: NOT DETECTED
HPIV2 RNA NPH QL NAA+NON-PROBE: NOT DETECTED
HPIV3 RNA NPH QL NAA+NON-PROBE: NOT DETECTED
HPIV4 RNA NPH QL NAA+NON-PROBE: NOT DETECTED
M PNEUMO DNA NPH QL NAA+NON-PROBE: NOT DETECTED
RSV RNA NPH QL NAA+NON-PROBE: NOT DETECTED
RV+EV RNA NPH QL NAA+NON-PROBE: DETECTED
SARS-COV-2 RNA NPH QL NAA+NON-PROBE: NOT DETECTED
TROPONIN, HIGH SENSITIVITY: 7 NG/L (ref 0–19)

## 2024-12-03 PROCEDURE — 84484 ASSAY OF TROPONIN QUANT: CPT

## 2024-12-03 PROCEDURE — 6360000002 HC RX W HCPCS: Performed by: PERSONAL EMERGENCY RESPONSE ATTENDANT

## 2024-12-03 PROCEDURE — 93005 ELECTROCARDIOGRAM TRACING: CPT | Performed by: STUDENT IN AN ORGANIZED HEALTH CARE EDUCATION/TRAINING PROGRAM

## 2024-12-03 PROCEDURE — 6370000000 HC RX 637 (ALT 250 FOR IP): Performed by: PERSONAL EMERGENCY RESPONSE ATTENDANT

## 2024-12-03 PROCEDURE — 94640 AIRWAY INHALATION TREATMENT: CPT

## 2024-12-03 PROCEDURE — 71045 X-RAY EXAM CHEST 1 VIEW: CPT

## 2024-12-03 PROCEDURE — 99285 EMERGENCY DEPT VISIT HI MDM: CPT

## 2024-12-03 PROCEDURE — 94761 N-INVAS EAR/PLS OXIMETRY MLT: CPT

## 2024-12-03 PROCEDURE — 96374 THER/PROPH/DIAG INJ IV PUSH: CPT

## 2024-12-03 PROCEDURE — 0202U NFCT DS 22 TRGT SARS-COV-2: CPT

## 2024-12-03 RX ORDER — IPRATROPIUM BROMIDE AND ALBUTEROL SULFATE 2.5; .5 MG/3ML; MG/3ML
1 SOLUTION RESPIRATORY (INHALATION) CONTINUOUS PRN
Status: DISCONTINUED | OUTPATIENT
Start: 2024-12-03 | End: 2024-12-03 | Stop reason: HOSPADM

## 2024-12-03 RX ORDER — KETOROLAC TROMETHAMINE 30 MG/ML
30 INJECTION, SOLUTION INTRAMUSCULAR; INTRAVENOUS ONCE
Status: COMPLETED | OUTPATIENT
Start: 2024-12-03 | End: 2024-12-03

## 2024-12-03 RX ADMIN — IPRATROPIUM BROMIDE AND ALBUTEROL SULFATE 1 DOSE: 2.5; .5 SOLUTION RESPIRATORY (INHALATION) at 01:45

## 2024-12-03 RX ADMIN — KETOROLAC TROMETHAMINE 30 MG: 30 INJECTION, SOLUTION INTRAMUSCULAR at 01:47

## 2024-12-03 ASSESSMENT — ENCOUNTER SYMPTOMS
COUGH: 1
BLOOD IN STOOL: 0
NAUSEA: 1
COLOR CHANGE: 0
VOMITING: 0
SHORTNESS OF BREATH: 1
RHINORRHEA: 0
DIARRHEA: 0
SORE THROAT: 1
WHEEZING: 1
ABDOMINAL PAIN: 0

## 2024-12-03 ASSESSMENT — PAIN - FUNCTIONAL ASSESSMENT: PAIN_FUNCTIONAL_ASSESSMENT: 0-10

## 2024-12-03 ASSESSMENT — PAIN SCALES - GENERAL: PAINLEVEL_OUTOF10: 8

## 2024-12-03 NOTE — ED PROVIDER NOTES
is no distension.      Palpations: Abdomen is soft. There is no mass.      Tenderness: There is abdominal tenderness. There is no guarding or rebound.      Comments: Minimal midline abdominal tenderness to palpation, abdomen soft and nondistended, no rebound or rigidity, no pulsatile mass or bruit, no ecchymosis   Musculoskeletal:         General: Normal range of motion.      Cervical back: Normal range of motion and neck supple.   Skin:     General: Skin is warm and dry.      Capillary Refill: Capillary refill takes less than 2 seconds.      Findings: No rash.   Neurological:      Mental Status: He is alert and oriented to person, place, and time.      Deep Tendon Reflexes: Reflexes are normal and symmetric.   Psychiatric:         Behavior: Behavior normal.         Thought Content: Thought content normal.         Judgment: Judgment normal.         DIAGNOSTIC RESULTS     EKG:All EKG's are interpreted by the Emergency Department Physician who either signs or Co-signs this chart in the absence of a cardiologist.    Personal interpretation:    Normal sinus rhythm, rate 90, normal intervals, normal axis, no ST segment changes    RADIOLOGY:   Non-plain film images such as CT, Ultrasound and MRI are read by theradiologist. Plain radiographic images are visualized and preliminarily interpreted by the emergency physician with the below findings:    Interpretation per theRadiologist below, if available at the time of this note:    XR CHEST PORTABLE   Final Result   No acute cardiopulmonary process.                 LABS:  Labs Reviewed   RESPIRATORY PANEL, MOLECULAR, WITH COVID-19 - Abnormal; Notable for the following components:       Result Value    Human Rhinovirus/Enterovirus by PCR DETECTED (*)     All other components within normal limits   TROPONIN       All other labs were within normal range or not returned as of this dictation.    EMERGENCY DEPARTMENT COURSE and DIFFERENTIAL DIAGNOSIS/MDM:   Vitals:    Vitals:

## 2024-12-03 NOTE — ED NOTES
Patient complaining of cough that started 3 weeks ago and chest pain that started 12 hours ago. Chest pain is right sided and non radiating. Resp even and unlabored. He is alert and oriented x4.

## 2024-12-03 NOTE — ED NOTES
[facility]   Emergency Department Culture Follow-Up       Good Mercedes (CSN: [id number]) was seen and evaluated at [facility] Emergency Department on 12/03/24 by provider ED providers: Esme Espinoza PA.    CULTURE RESULT TYPE: Respiratory panel positive for Human Rhinovirus/Enterovirus by PCR      Treatment Course:    The patient was appropriately treated in the emergency department and discharged with supportive care with standard anticipatory guidance.    This recommendation was reviewed with and agreed by ED provider ED providers: Taye Steen PA-C      Thank you,    Curt Beauchamp, PharmD  PGY-1 Pharmacy Resident  [facility]  [phone]

## 2025-01-06 ENCOUNTER — OFFICE VISIT (OUTPATIENT)
Dept: PRIMARY CARE | Facility: CLINIC | Age: 25
End: 2025-01-06
Payer: COMMERCIAL

## 2025-01-06 ENCOUNTER — LAB (OUTPATIENT)
Dept: LAB | Facility: LAB | Age: 25
End: 2025-01-06
Payer: COMMERCIAL

## 2025-01-06 VITALS
BODY MASS INDEX: 28.66 KG/M2 | SYSTOLIC BLOOD PRESSURE: 134 MMHG | HEART RATE: 106 BPM | DIASTOLIC BLOOD PRESSURE: 74 MMHG | OXYGEN SATURATION: 96 % | WEIGHT: 183 LBS

## 2025-01-06 DIAGNOSIS — Z11.59 NEED FOR HEPATITIS B SCREENING TEST: ICD-10-CM

## 2025-01-06 DIAGNOSIS — Z20.2 HPV EXPOSURE: Primary | ICD-10-CM

## 2025-01-06 DIAGNOSIS — Z11.4 SCREENING FOR HIV (HUMAN IMMUNODEFICIENCY VIRUS): ICD-10-CM

## 2025-01-06 DIAGNOSIS — Z11.3 SCREENING FOR STD (SEXUALLY TRANSMITTED DISEASE): ICD-10-CM

## 2025-01-06 DIAGNOSIS — Z11.59 NEED FOR HEPATITIS C SCREENING TEST: ICD-10-CM

## 2025-01-06 PROCEDURE — 87591 N.GONORRHOEAE DNA AMP PROB: CPT

## 2025-01-06 PROCEDURE — 99213 OFFICE O/P EST LOW 20 MIN: CPT | Performed by: FAMILY MEDICINE

## 2025-01-06 PROCEDURE — 86803 HEPATITIS C AB TEST: CPT

## 2025-01-06 PROCEDURE — 87491 CHLMYD TRACH DNA AMP PROBE: CPT

## 2025-01-06 PROCEDURE — 86780 TREPONEMA PALLIDUM: CPT

## 2025-01-06 PROCEDURE — 87661 TRICHOMONAS VAGINALIS AMPLIF: CPT

## 2025-01-06 PROCEDURE — 87340 HEPATITIS B SURFACE AG IA: CPT

## 2025-01-06 PROCEDURE — 87389 HIV-1 AG W/HIV-1&-2 AB AG IA: CPT

## 2025-01-06 ASSESSMENT — PATIENT HEALTH QUESTIONNAIRE - PHQ9
SUM OF ALL RESPONSES TO PHQ9 QUESTIONS 1 & 2: 0
2. FEELING DOWN, DEPRESSED OR HOPELESS: NOT AT ALL
1. LITTLE INTEREST OR PLEASURE IN DOING THINGS: NOT AT ALL

## 2025-01-06 NOTE — PROGRESS NOTES
Subjective   Patient ID: Facundo Thomas is a 24 y.o. male who presents for Other (PHQ2 - Negative/) and OTHER.    HPI   He is requesting STD screening  Last week he kissed someone and they told him that they had HPV. There was no other contact other than this  Denies any symptoms including any rash, sore throat, testicular pain or dysuria  Patient Health Questionnaire-2 Score: 0 (1/6/2025  2:25 PM)    Review of Systems   Skin:  Negative for rash.       Objective   /74   Pulse 106   Wt 83 kg (183 lb)   SpO2 96%   BMI 28.66 kg/m²     Physical Exam  Constitutional:       General: He is not in acute distress.     Appearance: Normal appearance. He is well-developed.   Pulmonary:      Effort: Pulmonary effort is normal.   Skin:     Findings: No rash.   Neurological:      Mental Status: He is alert.   Psychiatric:         Mood and Affect: Mood normal.         Behavior: Behavior normal.         Assessment/Plan   Assessment & Plan  HPV exposure         Screening for STD (sexually transmitted disease)    Orders:    C. trachomatis / N. gonorrhoeae, Amplified    Syphilis Screen with Reflex; Future    Trichomonas vaginalis, Amplified    Need for hepatitis C screening test    Orders:    Hepatitis C Antibody; Future    Syphilis Screen with Reflex; Future    Screening for HIV (human immunodeficiency virus)    Orders:    HIV 1/2 Antigen/Antibody Screen with Reflex to Confirmation; Future    Syphilis Screen with Reflex; Future    Need for hepatitis B screening test    Orders:    Hepatitis B Surface Antigen; Future    Syphilis Screen with Reflex; Future    He presents today for concerns regarding HPV exposure after kissing someone last week who told him that they had HPV.  We discussed HPV in detail today.  There is not a high risk of HPV transmission with kissing, and additionally he is up-to-date on the HPV vaccine series, so I would not be concerned at this point about him acquiring HPV infection from this exposure.  He is  interested in having full STI screening, so we will order testing including HIV, syphilis, hepatitis, GC/chlamydia, and trichomonas.

## 2025-01-07 LAB
C TRACH RRNA SPEC QL NAA+PROBE: NEGATIVE
HBV SURFACE AG SERPL QL IA: NONREACTIVE
HCV AB SER QL: NONREACTIVE
HIV 1+2 AB+HIV1 P24 AG SERPL QL IA: NONREACTIVE
N GONORRHOEA DNA SPEC QL PROBE+SIG AMP: NEGATIVE
T VAGINALIS RRNA SPEC QL NAA+PROBE: NEGATIVE
TREPONEMA PALLIDUM IGG+IGM AB [PRESENCE] IN SERUM OR PLASMA BY IMMUNOASSAY: NONREACTIVE

## 2025-01-28 ENCOUNTER — OFFICE VISIT (OUTPATIENT)
Dept: ORTHOPEDIC SURGERY | Facility: CLINIC | Age: 25
End: 2025-01-28
Payer: COMMERCIAL

## 2025-01-28 ENCOUNTER — HOSPITAL ENCOUNTER (EMERGENCY)
Dept: RADIOLOGY | Facility: CLINIC | Age: 25
Discharge: HOME | End: 2025-01-28
Payer: COMMERCIAL

## 2025-01-28 ENCOUNTER — HOSPITAL ENCOUNTER (EMERGENCY)
Facility: HOSPITAL | Age: 25
Discharge: HOME | End: 2025-01-28
Payer: COMMERCIAL

## 2025-01-28 VITALS
DIASTOLIC BLOOD PRESSURE: 74 MMHG | RESPIRATION RATE: 20 BRPM | TEMPERATURE: 97 F | SYSTOLIC BLOOD PRESSURE: 132 MMHG | WEIGHT: 182 LBS | BODY MASS INDEX: 28.56 KG/M2 | OXYGEN SATURATION: 99 % | HEIGHT: 67 IN | HEART RATE: 80 BPM

## 2025-01-28 VITALS — WEIGHT: 188 LBS | HEIGHT: 67 IN | BODY MASS INDEX: 29.51 KG/M2

## 2025-01-28 DIAGNOSIS — M54.42 ACUTE MIDLINE LOW BACK PAIN WITH LEFT-SIDED SCIATICA: ICD-10-CM

## 2025-01-28 DIAGNOSIS — M54.50 LEFT LOW BACK PAIN, UNSPECIFIED CHRONICITY, UNSPECIFIED WHETHER SCIATICA PRESENT: ICD-10-CM

## 2025-01-28 DIAGNOSIS — M54.32 LEFT SIDED SCIATICA: Primary | ICD-10-CM

## 2025-01-28 DIAGNOSIS — M54.16 LUMBAR RADICULOPATHY: ICD-10-CM

## 2025-01-28 DIAGNOSIS — S39.012A LUMBAR STRAIN, INITIAL ENCOUNTER: ICD-10-CM

## 2025-01-28 DIAGNOSIS — M54.42 ACUTE MIDLINE LOW BACK PAIN WITH LEFT-SIDED SCIATICA: Primary | ICD-10-CM

## 2025-01-28 PROCEDURE — 96372 THER/PROPH/DIAG INJ SC/IM: CPT | Performed by: NURSE PRACTITIONER

## 2025-01-28 PROCEDURE — 99213 OFFICE O/P EST LOW 20 MIN: CPT | Performed by: FAMILY MEDICINE

## 2025-01-28 PROCEDURE — 2500000005 HC RX 250 GENERAL PHARMACY W/O HCPCS: Performed by: NURSE PRACTITIONER

## 2025-01-28 PROCEDURE — 72110 X-RAY EXAM L-2 SPINE 4/>VWS: CPT

## 2025-01-28 PROCEDURE — 2500000004 HC RX 250 GENERAL PHARMACY W/ HCPCS (ALT 636 FOR OP/ED): Performed by: NURSE PRACTITIONER

## 2025-01-28 PROCEDURE — 99284 EMERGENCY DEPT VISIT MOD MDM: CPT | Mod: 25

## 2025-01-28 PROCEDURE — 72110 X-RAY EXAM L-2 SPINE 4/>VWS: CPT | Performed by: RADIOLOGY

## 2025-01-28 PROCEDURE — 99214 OFFICE O/P EST MOD 30 MIN: CPT | Mod: 27 | Performed by: FAMILY MEDICINE

## 2025-01-28 PROCEDURE — 3008F BODY MASS INDEX DOCD: CPT | Performed by: FAMILY MEDICINE

## 2025-01-28 RX ORDER — KETOROLAC TROMETHAMINE 30 MG/ML
15 INJECTION, SOLUTION INTRAMUSCULAR; INTRAVENOUS ONCE
Status: COMPLETED | OUTPATIENT
Start: 2025-01-28 | End: 2025-01-28

## 2025-01-28 RX ORDER — IBUPROFEN 600 MG/1
600 TABLET ORAL EVERY 6 HOURS PRN
Qty: 24 TABLET | Refills: 0 | Status: SHIPPED | OUTPATIENT
Start: 2025-01-28

## 2025-01-28 RX ORDER — METHYLPREDNISOLONE 4 MG/1
TABLET ORAL
Qty: 21 TABLET | Refills: 0 | Status: SHIPPED | OUTPATIENT
Start: 2025-01-28

## 2025-01-28 RX ORDER — LIDOCAINE 560 MG/1
1 PATCH PERCUTANEOUS; TOPICAL; TRANSDERMAL ONCE
Status: DISCONTINUED | OUTPATIENT
Start: 2025-01-28 | End: 2025-01-28 | Stop reason: HOSPADM

## 2025-01-28 RX ORDER — CYCLOBENZAPRINE HCL 10 MG
10 TABLET ORAL 2 TIMES DAILY PRN
Qty: 9 TABLET | Refills: 0 | Status: SHIPPED | OUTPATIENT
Start: 2025-01-28

## 2025-01-28 RX ORDER — LIDOCAINE 50 MG/G
1 PATCH TOPICAL DAILY
Qty: 7 PATCH | Refills: 0 | Status: SHIPPED | OUTPATIENT
Start: 2025-01-28

## 2025-01-28 RX ADMIN — LIDOCAINE 4% 1 PATCH: 40 PATCH TOPICAL at 06:59

## 2025-01-28 RX ADMIN — KETOROLAC TROMETHAMINE 15 MG: 30 INJECTION, SOLUTION INTRAMUSCULAR at 06:57

## 2025-01-28 ASSESSMENT — PAIN DESCRIPTION - PAIN TYPE: TYPE: CHRONIC PAIN

## 2025-01-28 ASSESSMENT — PAIN DESCRIPTION - ORIENTATION: ORIENTATION: LOWER

## 2025-01-28 ASSESSMENT — PAIN SCALES - GENERAL
PAINLEVEL_OUTOF10: 8
PAINLEVEL_OUTOF10: 5 - MODERATE PAIN

## 2025-01-28 ASSESSMENT — PAIN - FUNCTIONAL ASSESSMENT: PAIN_FUNCTIONAL_ASSESSMENT: 0-10

## 2025-01-28 ASSESSMENT — PATIENT HEALTH QUESTIONNAIRE - PHQ9
1. LITTLE INTEREST OR PLEASURE IN DOING THINGS: NOT AT ALL
SUM OF ALL RESPONSES TO PHQ9 QUESTIONS 1 AND 2: 0
2. FEELING DOWN, DEPRESSED OR HOPELESS: NOT AT ALL

## 2025-01-28 ASSESSMENT — PAIN DESCRIPTION - FREQUENCY: FREQUENCY: CONSTANT/CONTINUOUS

## 2025-01-28 ASSESSMENT — PAIN DESCRIPTION - DESCRIPTORS: DESCRIPTORS: ACHING;THROBBING

## 2025-01-28 ASSESSMENT — PAIN DESCRIPTION - LOCATION: LOCATION: BACK

## 2025-01-28 NOTE — ED PROVIDER NOTES
HPI   Chief Complaint   Patient presents with    Back Pain     Low back pain for years. My doc gave me Ibuprofen and muscle relaxers but it doesn't help it.       24-year-old male presents emergency department, states he has previous back injury, that flares up from time to time.  Describes pain midline lower lumbar spine, radiating across his lower back to his hip and down his leg.  States he uses ibuprofen and Flexeril at home which help for short time and then when the medication wears off the pain comes back.  Denies any numbness or tingling to distal extremity, no saddle anesthesia or incontinence of bowel or bladder.  No new falls or injuries.      History provided by:  Patient   used: No            Patient History   Past Medical History:   Diagnosis Date    Acute bronchitis 09/18/2024    Allergic     Ankle pain 09/18/2024    Asthma     Contact with and (suspected) exposure to covid-19 02/21/2023    Cough, unspecified 12/09/2013    Cough    Cough, unspecified 08/22/2022    Depression     Diarrhea 09/18/2024    Disease due to severe acute respiratory syndrome coronavirus 2 (SARS-CoV-2) 08/22/2022    Comment on above: COVID + WEAKNESS     LOW BACK PAIN, NKI COVID + 2 MONTHS AGO      Eczema     Generalized pain 09/18/2024    Nasal congestion 09/18/2024    Nausea and vomiting 01/22/2019    Comment on above: VOMITING      Pain of hand 09/18/2024    Pain, unspecified 08/19/2022    Personal history of other diseases of the respiratory system 03/09/2016    History of asthma    Right lower quadrant abdominal pain 05/09/2018    Sore throat 10/04/2018    Comment on above: Added by Problem List Migration; 2013-3-29; Moved to [facility] Nov 24 2013 4:44PM;      Tinea corporis 09/18/2024     Past Surgical History:   Procedure Laterality Date    CIRCUMCISION, PRIMARY  10/10/2013    Elective Circumcision    OTHER SURGICAL HISTORY  12/10/2019    Oral surgery    WISDOM TOOTH EXTRACTION       Family History    Problem Relation Name Age of Onset    Diabetes Father Erika sarah      Social History     Tobacco Use    Smoking status: Some Days     Current packs/day: 0.25     Types: Cigarettes    Smokeless tobacco: Current   Substance Use Topics    Alcohol use: Not Currently    Drug use: Never       Physical Exam   ED Triage Vitals [01/28/25 0629]   Temperature Heart Rate Respirations BP   36.1 °C (97 °F) 85 16 140/80      Pulse Ox Temp Source Heart Rate Source Patient Position   99 % Temporal Monitor Sitting      BP Location FiO2 (%)     Right arm --       Physical Exam  Back Pain:   Gen.: Vitals noted no distress. Afebrile.   Heart: Regular rate rhythm no murmur.   Lungs: Clear to auscultation bilaterally with good aeration and no adventitious breath sounds.   Abdomen: Soft, nontender, nonsurgical throughout. Normoactive bowel sounds. No pulsatile masses or abdominal bruits to auscultation.   Back: There is tenderness to palpation in the midline and paraspinal planes throughout the LS. Normal motor sensory, symmetric reflexes, strong equal peripheral pulses, normal Babinski's bilaterally.   Skin: No rash.   Neuro: No focal neurologic deficits, NIH score of 0.       ED Course & MDM   Diagnoses as of 01/28/25 0649   Acute midline low back pain with left-sided sciatica                 No data recorded     [place] Coma Scale Score: 15 (01/28/25 0629 : Roshni Mckenzie RN)                   Medical Decision Making  Patient describes low back pain with left-sided sciatica.  Given Toradol injection and a Lidoderm patch placed here.  Should continue with anti-inflammatories and muscle relaxers at home, feel that because his pain initiates midline he may benefit from steroid as well.    Otherwise no emergent or urgent conditions were identified.  I did refer him to follow-up with Ortho.  Discussed return with any worsening symptoms or additional concern    Procedure  Procedures     Ayleen Evans, APRN-CNP  01/28/25 0684

## 2025-01-28 NOTE — PROGRESS NOTES
Acute Injury New Patient Visit    CC:   Chief Complaint   Patient presents with    Lower Back - Pain     Low back pain , xrays today, got hurt a couple years ago lifting heavy ,pain radiates with numbness down his left leg       HPI: Facundo is a 24 y.o.male who presents today with new complaints of acute on chronic low back pain.  He states a previous injury several years ago that has been ongoing and somewhat persistent for him.  He stated the other day while at work he notices severe pain and discomfort with pain rating down the back and lateral side of the leg.  He denies any numbness tingling or burning down to the level of his foot.  Denies any bowel or bladder incontinence denies any saddle anesthesia.  Presented to the emergency department earlier today and was asked to follow-up with orthopedics.  He is known to our spine team in the past.        Review of Systems   GENERAL: Negative for malaise, significant weight loss, fever  MUSCULOSKELETAL: See HPI  NEURO: Negative for numbness / tingling     Past Medical History  Past Medical History:   Diagnosis Date    Acute bronchitis 09/18/2024    Allergic     Ankle pain 09/18/2024    Asthma     Contact with and (suspected) exposure to covid-19 02/21/2023    Cough, unspecified 12/09/2013    Cough    Cough, unspecified 08/22/2022    Depression     Diarrhea 09/18/2024    Disease due to severe acute respiratory syndrome coronavirus 2 (SARS-CoV-2) 08/22/2022    Comment on above: COVID + WEAKNESS     LOW BACK PAIN, NKI COVID + 2 MONTHS AGO      Eczema     Generalized pain 09/18/2024    Nasal congestion 09/18/2024    Nausea and vomiting 01/22/2019    Comment on above: VOMITING      Pain of hand 09/18/2024    Pain, unspecified 08/19/2022    Personal history of other diseases of the respiratory system 03/09/2016    History of asthma    Right lower quadrant abdominal pain 05/09/2018    Sore throat 10/04/2018    Comment on above: Added by Problem List Migration; 2013-3-29;  Moved to Suppressed 2013 4:44PM;      Tinea corporis 2024       Medication review  Medication Documentation Review Audit       Reviewed by Blair Mcmahan MA (Medical Assistant) on 25 at 0811      Medication Order Taking? Sig Documenting Provider Last Dose Status   albuterol 90 mcg/actuation inhaler [id number] No Inhale 2 puffs every 4 hours if needed for wheezing or shortness of breath. Дмитрий Bill, DO Taking Active   ARIPiprazole (Abilify) 10 mg tablet [id number] No Take 1 tablet (10 mg) by mouth once daily. Дмитрий Bill, DO Taking Active   cetirizine (ZyrTEC) 10 mg tablet [id number] No Take 1 tablet (10 mg) by mouth once daily as needed for allergies. Дмитрий Bill DO Taking Active   cyclobenzaprine (Flexeril) 10 mg tablet [id number]  Take 1 tablet (10 mg) by mouth 2 times a day as needed for muscle spasms. SHRUTHI Dodson  Active   cyclobenzaprine (Flexeril) 5 mg tablet [id number]  Take 1 tablet (5 mg) by mouth as needed at bedtime for muscle spasms. Дмитрий Bill, DO  Active   EPINEPHrine 0.3 mg/0.3 mL injection syringe [id number] No Inject 0.3 mL (0.3 mg) into the muscle 1 time for 1 dose. Дмитрий Bill DO Taking  24 6869   fluticasone (Flonase) 50 mcg/actuation nasal spray [id number]  Administer 2 sprays into each nostril once daily. Shake gently. Before first use, prime pump. After use, clean tip and replace cap. Дмитрий Bill, DO  Active   guaiFENesin (Mucinex) 600 mg 12 hr tablet [id number]  Take 1 tablet (600 mg) by mouth 2 times a day as needed for cough. Do not crush, chew, or split. Дмитрий Bill DO  Active   ibuprofen 600 mg tablet [id number]  Take 1 tablet (600 mg) by mouth every 6 hours if needed for mild pain (1 - 3) or fever (temp greater than 38.0 C). SHRUTHI Dodson  Active   ibuprofen 800 mg tablet [id number]  Take 1 tablet (800 mg) by mouth every 6 hours during the day. Дмитрий Bill DO  Active   ketorolac (Toradol) injection 15 mg [id number]   Ayleen Evans, APRN-CNP    25 0657   lidocaine (Lidoderm) 5 % patch [id number]  Place 1 patch over 12 hours on the skin once daily. Remove & discard patch within 12 hours or as directed by MD. SHRUTHI Dodson  Active      Discontinued 25 0757   methylPREDNISolone (Medrol Dospak) 4 mg tablets [id number]  Follow schedule on package instructions SHRUTHI Dodson  Active   ondansetron (Zofran) 4 mg tablet [id number] No Take 1 tablet (4 mg) by mouth every 8 hours if needed for nausea. Дмитрий Bill, DO Taking Active                    Allergies  Allergies   Allergen Reactions    Hazelnut Anaphylaxis    Amoxicillin-Pot Clavulanate Hives    Cephalosporins Hives    Gentamicin Hives    Penicillins Hives    Red Dye Unknown    Acetaminophen Hives and Rash     Rash    Only liquid form       Social History  Social History     Socioeconomic History    Marital status:      Spouse name: Not on file    Number of children: Not on file    Years of education: Not on file    Highest education level: Not on file   Occupational History    Not on file   Tobacco Use    Smoking status: Some Days     Current packs/day: 0.25     Types: Cigarettes    Smokeless tobacco: Current   Substance and Sexual Activity    Alcohol use: Not Currently    Drug use: Never    Sexual activity: Not on file   Other Topics Concern    Not on file   Social History Narrative    Not on file     Social Drivers of Health     Financial Resource Strain: Low Risk  (3/8/2020)    Received from [facility]    Overall Financial Resource Strain (CARDIA)     Difficulty of Paying Living Expenses: Not very hard   Food Insecurity: No Food Insecurity (3/8/2020)    Received from [facility]    Hunger Vital Sign     Worried About Running Out of Food in the Last Year: Never true     Ran Out of Food in the Last Year: Never true   Transportation Needs: No Transportation Needs (3/8/2020)    Received from [facility]    PRAPARE - Transportation     Lack of Transportation (Medical): No     Lack of Transportation (Non-Medical): No   Physical Activity: Insufficiently Active (3/8/2020)    Received from [facility]    Exercise Vital Sign     Days of Exercise per Week: 5 days     Minutes of Exercise per Session: 10 min   Stress: No Stress Concern Present (3/8/2020)    Received from [facility]    [place] [employer] - Occupational Stress Questionnaire     Feeling of Stress : Not at all   Social Connections: Moderately Isolated (3/8/2020)    Received from [facility]    Social Connection and Isolation Panel [NHANES]     Frequency of Communication with Friends and Family: More than three times a week     Frequency of Social Gatherings with Friends and Family: More than three times a week     Attends [employer] Services: Never     Active Member of Clubs or Organizations: No     Attends Club or Organization Meetings: Never     Marital Status: Living with partner   Intimate Partner Violence: Not on file   Housing Stability: Not on file       Surgical History  Past Surgical History:   Procedure Laterality Date    CIRCUMCISION, PRIMARY  10/10/2013    Elective Circumcision    OTHER SURGICAL HISTORY  12/10/2019    Oral surgery    WISDOM TOOTH EXTRACTION         Physical Exam:  GENERAL:  Patient is awake, alert, and oriented to person place and time.  Patient appears well nourished and well kept.  Affect Calm, Not Acutely Distressed.  HEENT:  Normocephalic, Atraumatic, EOMI  CARDIOVASCULAR:  Hemodynamically stable.  RESPIRATORY:  Normal respirations with unlabored breathing.  NEURO: Gross sensation intact to the lower extremities bilaterally.  Extremity: Low back exam demonstrates mild tenderness palpation down the midline of the spine and left worse than right paraspinal spasms.  He has ability to fully forward flex back extend with increase in  symptoms sidebending trunk rotate with minimal discomfort.  Negative straight leg raise on the right equivocal on the left 5 out of 5 hip flexion abduction adduction strength with normal internal and external rotation.  Normal patellar reflexes, bilateral calves are soft and nontender.  He is able to walk around the room march and his toes walk on his heels all without obvious difficulty.      Diagnostics: X-rays from today demonstrate no presence for fracture or dislocation  XR lumbar spine complete 4+ views          Interpreted By:  Yury Cash,   STUDY:  XR LUMBAR SPINE COMPLETE 4+ VIEWS; 1/28/2025 8:21 am      INDICATION:  Signs/Symptoms:pain      COMPARISON:  October 2022.      ACCESSION NUMBER(S):  [id number]      ORDERING CLINICIAN:  COLE BUDINSKY      TECHNIQUE:  Four view radiographs of the lumbar spine were obtained.      FINDINGS:  There is no fracture; the vertebral body heights are maintained. The  intervertebral disc spaces are preserved. The alignment is anatomic,  without spondylolysis or spondylolisthesis. On the flexion and  extension views there is no abnormal motion. The sacroiliac joints  are within normal limits.      IMPRESSION:  Normal lumbar spine radiographs.      Signed by: Yury Cash 1/28/2025 9:48 AM  Dictation workstation:   [id number]             Procedure: None  Procedures    Assessment:   Problem List Items Addressed This Visit       Low back pain, unspecified    Relevant Orders    XR lumbar spine complete 4+ views (Completed)    Referral to Physical Therapy    MR lumbar spine wo IV contrast    XR lumbar spine complete 4+ views (Completed)    Referral to Physical Therapy    MR lumbar spine wo IV contrast    Lumbar radiculopathy    Relevant Orders    Referral to Physical Therapy    MR lumbar spine wo IV contrast     Other Visit Diagnoses       Left sided sciatica    -  Primary    Relevant Orders    Referral to Physical Therapy    MR lumbar spine wo IV contrast    Lumbar  strain, initial encounter        Relevant Orders    Referral to Physical Therapy    MR lumbar spine wo IV contrast             Plan: At this time we will offer the patient anti-inflammatory to use in addition to the muscle relaxer and steroid provided by the emergency department.  He can also utilize topical lidocaine patches.  He may return to work as tolerated we will submit for physical therapy and he was given home exercises.  Will plan on having him follow-up with our spine team going forward for further evaluation.  Counseled and encouraged to present back to the emergency department for any onset of saddle anesthesia worsening lower extremity weakness and/or any bowel or bladder incontinence.  Orders Placed This Encounter    XR lumbar spine complete 4+ views    MR lumbar spine wo IV contrast    Referral to Physical Therapy      At the conclusion of the visit there were no further questions by the patient/family regarding their plan of care.  Patient was instructed to call or return with any issues, questions, or concerns regarding their injury and/or treatment plan described above.     01/28/25 at 1:00 PM - Cole C Budinsky, MD    Office: [phone]    This note was prepared using voice recognition software.  The details of this note are correct and have been reviewed, and corrected to the best of my ability.  Some grammatical errors may persist related to the Dragon software.

## 2025-02-05 ENCOUNTER — OFFICE VISIT (OUTPATIENT)
Dept: ORTHOPEDIC SURGERY | Facility: CLINIC | Age: 25
End: 2025-02-05
Payer: COMMERCIAL

## 2025-02-05 DIAGNOSIS — M54.16 LUMBAR RADICULOPATHY: Primary | ICD-10-CM

## 2025-02-05 PROCEDURE — 99214 OFFICE O/P EST MOD 30 MIN: CPT | Performed by: PHYSICIAN ASSISTANT

## 2025-02-05 NOTE — PROGRESS NOTES
New patient to establish to the practice comes to the office with his wife who is also historian patient's symptoms treatment and results.  Patient is complaining of low back pain going down the left leg to the knee.  No bowel or bladder complaints no saddle anesthesia went to the emergency room and he also saw Dr. Budinsky.  Dr. Budinsky ordered physical therapy and an MRI of not approved yet.  Has had physical therapy in the past no spine surgeries in the past.  No bowel or bladder complaints no saddle anesthesia.  No spine surgeries in the past    Looked at patient's recent blood work.  Checked a metabolic panel glucose 91 sodium 138 potassium 4.0 we looked at primary doctors notes we checked medication list see if he is on a statin type medication to cause muscular aches and pains I do not see that he is.    Physical exam: Well-nourished, well kept.  No lymphangitis or lymphadenopathy in the examined extremities.  Good perfusion to the extremities ×4.  Radial and dorsalis pedis pulses 2+.  Capillary refill to all 4 digits brisk.  No distal edema x 4.  Patient ambulating with no major difficulty.  Full range of motion cervical spine and observation motor strength intact.  Patient moving upper extremities without any major difficulty motor strength intact.  Examination of the lower extremities reveals no point tenderness, swelling, or deformity.  Range of motion of the hips, knees, and ankles are full without crepitance, instability, or exacerbation of pain.  Strength is 5/5 throughout.  Fairly good range of motion to lumbar spine and observation able to get from sitting to standing without any difficulty no redness, abrasions, or lesions on all 4 extremities, head and neck, or trunk.  Patient neurologically intact    X-rays lumbar spine that were taken prior and reviewed shows no evidence of any major degenerative disc disease or arthritis no fractures dislocations bony lytic lesions.  Report was reviewed as  well.    Assessment: This a patient with chronic history of low back pain radicular symptoms down the left leg to the knee needs further workup affecting his daily functioning.    Plan: Advised the patient to call and get into some physical therapy.  Will wait for approval for the MRI and I will see him back with that scan.

## 2025-02-07 ENCOUNTER — TELEMEDICINE (OUTPATIENT)
Dept: PRIMARY CARE | Facility: CLINIC | Age: 25
End: 2025-02-07
Payer: COMMERCIAL

## 2025-02-07 ENCOUNTER — TELEPHONE (OUTPATIENT)
Dept: PRIMARY CARE | Facility: CLINIC | Age: 25
End: 2025-02-07

## 2025-02-07 DIAGNOSIS — G89.29 CHRONIC LOW BACK PAIN, UNSPECIFIED BACK PAIN LATERALITY, UNSPECIFIED WHETHER SCIATICA PRESENT: Primary | ICD-10-CM

## 2025-02-07 DIAGNOSIS — M54.50 CHRONIC LOW BACK PAIN, UNSPECIFIED BACK PAIN LATERALITY, UNSPECIFIED WHETHER SCIATICA PRESENT: Primary | ICD-10-CM

## 2025-02-07 PROCEDURE — 99213 OFFICE O/P EST LOW 20 MIN: CPT | Performed by: FAMILY MEDICINE

## 2025-02-07 RX ORDER — PREDNISONE 50 MG/1
50 TABLET ORAL DAILY
Qty: 5 TABLET | Refills: 0 | Status: SHIPPED | OUTPATIENT
Start: 2025-02-07 | End: 2025-02-12

## 2025-02-07 ASSESSMENT — PATIENT HEALTH QUESTIONNAIRE - PHQ9
10. IF YOU CHECKED OFF ANY PROBLEMS, HOW DIFFICULT HAVE THESE PROBLEMS MADE IT FOR YOU TO DO YOUR WORK, TAKE CARE OF THINGS AT HOME, OR GET ALONG WITH OTHER PEOPLE: NOT DIFFICULT AT ALL
SUM OF ALL RESPONSES TO PHQ9 QUESTIONS 1 & 2: 1
2. FEELING DOWN, DEPRESSED OR HOPELESS: SEVERAL DAYS
1. LITTLE INTEREST OR PLEASURE IN DOING THINGS: NOT AT ALL

## 2025-02-07 ASSESSMENT — ENCOUNTER SYMPTOMS
WEIGHT LOSS: 0
HEADACHES: 0
DYSURIA: 0
FEVER: 0
TINGLING: 1
BOWEL INCONTINENCE: 0
WEAKNESS: 0
PERIANAL NUMBNESS: 0
WEAKNESS: 1
PARESTHESIAS: 0
ABDOMINAL PAIN: 0
NUMBNESS: 0
LEG PAIN: 1
BACK PAIN: 1
PARESIS: 0

## 2025-02-07 NOTE — PROGRESS NOTES
Subjective   Patient ID: Facundo Thomas is a 24 y.o. male who presents for Back Pain.    Virtual or Telephone Consent    A telephone visit (audio only) between the patient (at the originating site) and the provider (at the distant site) was utilized to provide this telehealth service.   Verbal consent was requested and obtained from Facundo Thomas on this date, 02/07/25 for a telehealth visit.     Back Pain  This is a chronic problem. The current episode started more than 1 year ago. The problem occurs constantly. The problem has been rapidly worsening since onset. The pain is present in the lumbar spine. The quality of the pain is described as aching and shooting. The pain radiates to the left knee and left thigh. The pain is at a severity of 10/10. The pain is The same all the time. The symptoms are aggravated by bending, position, lying down, sitting and standing. Stiffness is present All day. Associated symptoms include leg pain and tingling. Pertinent negatives include no abdominal pain, bladder incontinence, bowel incontinence, chest pain, dysuria, fever, headaches, numbness, paresis, paresthesias, pelvic pain, perianal numbness, weakness or weight loss. (Radiates down left leg) Treatments tried: ibuprofen, steroid injection.        He would like to discuss back pain.  He has had back pain which has overall been present for several years.  This started to get worse approximately 1 month ago  He went to the ED on 1/28 and had an x-ray of his lumbar spine which was unremarkable  He was given a steroid injection in the ED which helped only temporarily  He has since been following with orthopedics for this and they referred him for physical therapy and ordered an MRI  He has done 2 sessions of physical therapy so far and has not noticed any improvements  He has continued to have pain in his low back which he rates as 10 out of 10.  This is located bilateral lower back and radiates down his left leg to just above the  knee.  No leg numbness or weakness but he will get intermittent tingling involving his entire left leg.  Pain gets worse with activity.  Pain is constant.  No loss of bowel or bladder control  He has been taking ibuprofen 600 mg with only minimal improvement    Patient Health Questionnaire-2 Score: 1 (2/7/2025  2:46 PM)        Review of Systems   Constitutional:  Negative for fever and weight loss.   Cardiovascular:  Negative for chest pain.   Gastrointestinal:  Negative for abdominal pain and bowel incontinence.   Genitourinary:  Negative for bladder incontinence, dysuria and pelvic pain.   Musculoskeletal:  Positive for back pain.   Neurological:  Positive for tingling. Negative for weakness, numbness, headaches and paresthesias.       Objective   There were no vitals taken for this visit.    Physical Exam not done due to today's visit being done over the telephone    Assessment/Plan   Assessment & Plan  Chronic low back pain, unspecified back pain laterality, unspecified whether sciatica present    Orders:    predniSONE (Deltasone) 50 mg tablet; Take 1 tablet (50 mg) by mouth once daily for 5 days.    He presents today with a history of chronic lower back pain which has been getting worse over the past month.  Recent x-ray was unremarkable.  This has not been improving with NSAIDs so we will treat with a 5-day burst of prednisone 50 mg daily.  Continue to follow with orthopedics and they are working on getting an MRI approved for him.  Recommended continuing physical therapy and follow-up in our office or with orthopedics if back pain does not improve   05-Apr-2023

## 2025-02-07 NOTE — TELEPHONE ENCOUNTER
Pt has excruciating back pain traveling to his legs. Has tried ibruprophen,muscle relaxer and spasm meds. Nothing is working. Please advise  [phone]

## 2025-02-07 NOTE — ASSESSMENT & PLAN NOTE
Orders:    predniSONE (Deltasone) 50 mg tablet; Take 1 tablet (50 mg) by mouth once daily for 5 days.

## 2025-02-10 ENCOUNTER — HOSPITAL ENCOUNTER (EMERGENCY)
Facility: HOSPITAL | Age: 25
Discharge: HOME | End: 2025-02-10
Attending: EMERGENCY MEDICINE
Payer: COMMERCIAL

## 2025-02-10 VITALS
SYSTOLIC BLOOD PRESSURE: 142 MMHG | TEMPERATURE: 98.1 F | WEIGHT: 182 LBS | DIASTOLIC BLOOD PRESSURE: 90 MMHG | RESPIRATION RATE: 17 BRPM | HEIGHT: 67 IN | OXYGEN SATURATION: 98 % | HEART RATE: 86 BPM | BODY MASS INDEX: 28.56 KG/M2

## 2025-02-10 DIAGNOSIS — M54.40 CHRONIC LOW BACK PAIN WITH SCIATICA, SCIATICA LATERALITY UNSPECIFIED, UNSPECIFIED BACK PAIN LATERALITY: Primary | ICD-10-CM

## 2025-02-10 DIAGNOSIS — G89.29 CHRONIC LOW BACK PAIN WITH SCIATICA, SCIATICA LATERALITY UNSPECIFIED, UNSPECIFIED BACK PAIN LATERALITY: Primary | ICD-10-CM

## 2025-02-10 PROCEDURE — 96372 THER/PROPH/DIAG INJ SC/IM: CPT

## 2025-02-10 PROCEDURE — 99284 EMERGENCY DEPT VISIT MOD MDM: CPT | Performed by: EMERGENCY MEDICINE

## 2025-02-10 PROCEDURE — 2500000005 HC RX 250 GENERAL PHARMACY W/O HCPCS

## 2025-02-10 PROCEDURE — 2500000004 HC RX 250 GENERAL PHARMACY W/ HCPCS (ALT 636 FOR OP/ED)

## 2025-02-10 RX ORDER — LIDOCAINE 560 MG/1
1 PATCH PERCUTANEOUS; TOPICAL; TRANSDERMAL ONCE
Status: DISCONTINUED | OUTPATIENT
Start: 2025-02-10 | End: 2025-02-10 | Stop reason: HOSPADM

## 2025-02-10 RX ORDER — KETOROLAC TROMETHAMINE 15 MG/ML
15 INJECTION, SOLUTION INTRAMUSCULAR; INTRAVENOUS ONCE
Status: COMPLETED | OUTPATIENT
Start: 2025-02-10 | End: 2025-02-10

## 2025-02-10 RX ORDER — OXYCODONE HYDROCHLORIDE 5 MG/1
5 TABLET, COATED ORAL EVERY 6 HOURS PRN
Qty: 12 TABLET | Refills: 0 | Status: SHIPPED | OUTPATIENT
Start: 2025-02-10

## 2025-02-10 RX ADMIN — LIDOCAINE 4% 1 PATCH: 40 PATCH TOPICAL at 10:52

## 2025-02-10 RX ADMIN — KETOROLAC TROMETHAMINE 15 MG: 15 INJECTION, SOLUTION INTRAMUSCULAR; INTRAVENOUS at 10:53

## 2025-02-10 ASSESSMENT — LIFESTYLE VARIABLES
HAVE PEOPLE ANNOYED YOU BY CRITICIZING YOUR DRINKING: NO
TOTAL SCORE: 0
HAVE YOU EVER FELT YOU SHOULD CUT DOWN ON YOUR DRINKING: NO
EVER HAD A DRINK FIRST THING IN THE MORNING TO STEADY YOUR NERVES TO GET RID OF A HANGOVER: NO
EVER FELT BAD OR GUILTY ABOUT YOUR DRINKING: NO

## 2025-02-10 ASSESSMENT — PAIN DESCRIPTION - LOCATION: LOCATION: BACK

## 2025-02-10 ASSESSMENT — PAIN - FUNCTIONAL ASSESSMENT: PAIN_FUNCTIONAL_ASSESSMENT: 0-10

## 2025-02-10 ASSESSMENT — PAIN SCALES - GENERAL: PAINLEVEL_OUTOF10: 10 - WORST POSSIBLE PAIN

## 2025-02-10 ASSESSMENT — PAIN DESCRIPTION - ORIENTATION: ORIENTATION: MID

## 2025-02-10 NOTE — Clinical Note
Facundo Thomas was seen and treated in our emergency department on 2/10/2025.  He may return to work on 02/11/2025.       If you have any questions or concerns, please don't hesitate to call.      Joseph Perry, DO

## 2025-02-10 NOTE — ED PROVIDER NOTES
Emergency Department Provider Note        History of Present Illness     History provided by: Patient  Limitations to History: None  External Records Reviewed with Brief Summary: None    HPI:  Facundo Thomas is a 24 y.o. male with a history of sciatica presenting to the ED with complaint of low back pain rating down his left leg.  Patient reports that he has had symptoms for the past month and saw an orthopedic this past week who scheduled for an MRI.  He was also prescribed prednisone today which she took 1 tab this morning.  Says he was at work and the pain was unbearable prompting him to present to the ED.  Denies any trauma, weakness, urinary or bowel incontinence, fevers, or numbness when wiping.    Physical Exam   Triage vitals:  T 36.7 °C (98.1 °F)  HR 95  /71  RR 18  O2 98 % None (Room air)    General: Awake, alert, in no acute distress  Eyes: Gaze conjugate.  No scleral icterus or injection  HENT: Normo-cephalic, atraumatic. No stridor  CV: Regular rate, regular rhythm. Radial pulses 2+ bilaterally  Resp: Breathing non-labored, speaking in full sentences.  Clear to auscultation bilaterally  GI: Soft, non-distended, non-tender. No rebound or guarding.  MSK/Extremities: No gross bony deformities. Moving all extremities  Skin: Warm. Appropriate color  Neuro: Alert. Oriented. Face symmetric. Speech is fluent.  Gross strength and sensation intact in b/l UE and LEs  Psych: Appropriate mood and affect    Medical Decision Making & ED Course   Medical Decision Makin y.o. male evaluated in the ED for low back pain.  Patient is afebrile, sinus, normotensive, saturating well on room air, and in no acute distress.  On exam there is no midline tenderness and no reproducible pain on straight leg raise.      I estimate there is LOW risk for ABDOMINAL AORTIC ANEURYSM, CAUDA EQUINA SYNDROME, EPIDURAL MASS LESION, SPINAL STENOSIS, OR HERNIATED DISK CAUSING SEVERE STENOSIS, thus I consider the discharge  disposition reasonable. Denies any neurological symptoms including sensory loss, weakness that is not secondary to pain restrictions, bowel or bladder incontinence/retention or saddle anesthesia. Denies any history of cancer, IV drug use, hemodialysis, spinal surgery, HIV, insulin use, or fevers. No recent trauma in general or to the low back area.    We have discussed the diagnosis and risks, and we agree with discharging home to follow-up with their primary doctor. We also discussed returning to the Emergency Department immediately if new or worsening symptoms occur. We have discussed the symptoms which are most concerning (e.g., saddle anesthesia, urinary or bowel incontinence or retention, changing or worsening pain) that necessitate immediate return.    MRI considered however not indicated due to lack of history of IV drug use, spinal surgery, malignancy.    Patient provided Toradol and lidocaine patch in the ED.  Advised to continue taking his prednisone as prescribed and he feels comfortable returning to follow-up with his orthopedic surgeon.  ----  Differential diagnoses considered include but are not limited to: Musculoskeletal strain, sciatica, disc herniation     Social Determinants of Health which Significantly Impact Care: Substance use disorder The following actions were taken to address these social determinants: Patient counseled on smoking cessation    EKG Independent Interpretation: EKG not obtained    Independent Result Review and Interpretation: None obtained    Chronic conditions affecting the patient's care: As documented above in [facility]    The patient was discussed with the following consultants/services: None    Care Considerations: As documented above in [facility]    ED Course:  Diagnoses as of 02/10/25 1136   Chronic low back pain with sciatica, sciatica laterality unspecified, unspecified back pain laterality     Disposition   As a result of the work-up, the patient was discharged home.  he was  informed of his diagnosis and instructed to come back with any concerns or worsening of condition.  he and was agreeable to the plan as discussed above.  he was given the opportunity to ask questions.  All of the patient's questions were answered.    Procedures   Procedures    This was a shared visit with an ED attending.  The patient was seen and discussed with the ED attending    Yury Esteban MD  Emergency Medicine    =================Attending note===============    The patient was seen by the resident/fellow.  I have personally performed a substantive portion of the encounter.  I have seen and examined the patient; agree with the workup, evaluation, MDM,   management and diagnosis.  The care plan has been discussed with the resident; I have reviewed the resident’s note and agree with the documented findings.      This is a 24 y.o. male who presents to ER with low back pain.  Has had low back pain since 2022.  Have a history of sciatica.  Primary care recently started him on steroids today.  He has been seen by orthopedics and he is due to get an MRI.  Also seen at [place] emergency department in the past.  No new injuries.  No numbness or tingling.  No incontinence of urine or stool.  He states this started in 2022 when he was lifting a heavy barrel.  Pain is made worse with standing and movement.    Heart regular.  Lungs clear.  Abdomen soft and nontender.  No abdominal bruit.    No saddle anesthesia. Sensation fully intact in the legs. Pedal pulses intact. Full strength and range of motion against resistance with flexion and extension at the hips, knees, ankles, toes. Deep tendon reflexes are 2+ at the bilateral patella and Achilles. Patient is able to stand on their toes and his heels without any difficulty.  Patient does have some tenderness lower lumbar spine                   SENSORY                     MOTOR                               DTR                   Medial Leg                  Walk on Heels                       Patellar       L4 Right  [2]                                    [5]                  [2]            Left     [2]                                    [5]                  [2]                   Top of Foot                 Ext Big Toe                         None       L5 Right  [2]                                    [5]           Left     [2]                                     [5]                   Sole of Foot                Walk on Toes                      Achilles       S1 Right  [2]                                    [5]                   [2]                           Left     [2]                                    [5]                                 [2]      Patient is given a short course of Percocet.  He is to continue the steroids and non opiate pain meds.  He is to keep his appointment follow-up with orthopedics.  He is to return to nearest ER for any new or worsening symptoms.  He is satisfied this plan.      ==========================================       Yury Esteban MD  Resident  02/10/25 [id number]       Joseph Perry DO  02/10/25 [id number]

## 2025-02-17 ENCOUNTER — APPOINTMENT (OUTPATIENT)
Facility: HOSPITAL | Age: 25
End: 2025-02-17
Payer: COMMERCIAL

## 2025-02-24 ENCOUNTER — TELEPHONE (OUTPATIENT)
Dept: PRIMARY CARE | Facility: CLINIC | Age: 25
End: 2025-02-24

## 2025-02-24 ENCOUNTER — TELEMEDICINE (OUTPATIENT)
Dept: PRIMARY CARE | Facility: CLINIC | Age: 25
End: 2025-02-24
Payer: COMMERCIAL

## 2025-02-24 VITALS
OXYGEN SATURATION: 95 % | DIASTOLIC BLOOD PRESSURE: 79 MMHG | HEART RATE: 77 BPM | SYSTOLIC BLOOD PRESSURE: 125 MMHG | TEMPERATURE: 97.6 F

## 2025-02-24 DIAGNOSIS — J06.9 UPPER RESPIRATORY TRACT INFECTION, UNSPECIFIED TYPE: Primary | ICD-10-CM

## 2025-02-24 LAB
POC RAPID INFLUENZA B: NEGATIVE
POC RAPID STREP: NEGATIVE
POC SARS-COV-2 AG BINAX: NORMAL

## 2025-02-24 PROCEDURE — 87811 SARS-COV-2 COVID19 W/OPTIC: CPT | Performed by: FAMILY MEDICINE

## 2025-02-24 PROCEDURE — 99213 OFFICE O/P EST LOW 20 MIN: CPT | Performed by: FAMILY MEDICINE

## 2025-02-24 PROCEDURE — 87804 INFLUENZA ASSAY W/OPTIC: CPT | Performed by: FAMILY MEDICINE

## 2025-02-24 PROCEDURE — 87880 STREP A ASSAY W/OPTIC: CPT | Performed by: FAMILY MEDICINE

## 2025-02-24 ASSESSMENT — ENCOUNTER SYMPTOMS
COUGH: 1
RHINORRHEA: 0
FEVER: 0
SORE THROAT: 1

## 2025-02-24 NOTE — PROGRESS NOTES
Subjective   Patient ID: Facundo Thomas is a 24 y.o. male who presents for No chief complaint on file..    URI   This is a new problem. Associated symptoms include coughing and a sore throat. Pertinent negatives include no congestion or rhinorrhea.      Virtual or Telephone Consent    An interactive audio and video telecommunication system which permits real time communications between the patient (at the originating site) and provider (at the distant site) was utilized to provide this telehealth service.   Verbal consent was requested and obtained from Facundo Thomas on this date, 02/24/25 for a telehealth visit.       He developed flulike symptoms this morning.  He initially developed a sore throat and his entire body hurts.  Has also had a cough.  No fever.  He did have a headache approximately 2 days ago        Review of Systems   Constitutional:  Negative for fever.   HENT:  Positive for sore throat. Negative for congestion and rhinorrhea.    Respiratory:  Positive for cough.        Objective   There were no vitals taken for this visit.    Physical Exam  Constitutional:       General: He is not in acute distress.     Appearance: Normal appearance. He is well-developed.   Pulmonary:      Effort: Pulmonary effort is normal.   Skin:     Findings: No rash.   Neurological:      Mental Status: He is alert.   Psychiatric:         Mood and Affect: Mood normal.         Behavior: Behavior normal.         Assessment/Plan   Assessment & Plan  Upper respiratory tract infection, unspecified type    Orders:    POCT rapid strep A manually resulted    POCT Influenza A/B manually resulted    POCT BinaxNOW Covid-19 Ag Card manually resulted     He presents today with symptoms including diffuse myalgias, cough and sore throat which started earlier this morning.  Recommended testing for COVID, flu, and strep.  He is going to stop by the office this afternoon for testing.  Will discuss further management once I have these  results.      Addendum: Testing for strep, COVID and flu are all negative.  I discussed with him.  Symptoms started earlier today and are most likely viral.  Recommended symptomatic care and follow-up if not improving in the next 4 to 5 days

## 2025-02-24 NOTE — LETTER
February 24, 2025     Patient: Facundo Thomas   YOB: 2000   Date of Visit: 2/24/2025       To Whom It May Concern     Please excuse Facundo for his absence from work on 02/24/25    If you have any questions or concerns, please don't hesitate to call.         Sincerely,         Дмитрий Bill, DO

## 2025-02-27 NOTE — PROGRESS NOTES
Physical Therapy  Physical Therapy Evaluation    Patient Name: Facundo Thomas  MRN: [id number]  Today's Date: 3/3/2025  Time Calculation  Start Time: 0449  Stop Time: 0512  Time Calculation (min): 23 min  PT Evaluation Time Entry  PT Evaluation (Low) Time Entry: 15  PT Therapeutic Procedures Time Entry  Therapeutic Exercise Time Entry: 8                 Insurance:  COPAY 0 DED 0  Visit number: 1 of 9  Authorization info: NO AUTH REQ  Insurance Type: [employer] [id number] THEN AUTH IS REQ      General:  Reason for visit: LBP  Referred by: Cole C Budinsky, MD     Current Problem:  M54.42ICD-10-CMAcute midline low back pain with left-sided sciatica  M54.50ICD-10-CMLeft low back pain, unspecified chronicity, unspecified whether sciatica present  S39.012AICD-10-CMLumbar strain, initial encounter  M54.32ICD-10-CMLeft sided sciatica  M54.16ICD-10-CMLumbar radiculopathy    Precautions: per MR         Medical History Form: Reviewed (scanned into chart)    Subjective:   New patient to establish to the practice comes to the office with his wife who is also historian patient's symptoms treatment and results. Patient is complaining of low back pain going down the left leg to the knee. No bowel or bladder complaints no saddle anesthesia went to the emergency room and he also saw Dr. Budinsky. Dr. Budinsky ordered physical therapy and an MRI of not approved yet. Has had physical therapy in the past no spine surgeries in the past. No bowel or bladder complaints no saddle anesthesia. No spine surgeries in the past -Encounter note 2/5/25        Current Condition:   Same    Pain:  Pain Assessment: 0-10  0-10 (Numeric) Pain Score: 8  Pain Type: Chronic pain (Worst in past week)  Pain Location: Back  Pain Orientation: Left  Pain Frequency: Constant/continuous  Aggravating Factors:  Nonspecific  Relieving Factors:  NA    Relevant Information (PMH & Previous Tests/Imaging): XR 1/8/25  IMPRESSION:  Normal lumbar spine  radiographs    Previous Interventions/Treatments: Physical Therapy-self discharged in July 2024    Prior Level of Function (PLOF)  Patient previously independent with all ADLs  Exercise/Physical Activity: na  Work/School: na    Patients Living Environment: Reviewed and no concern    Primary Language: English    Patient's Goal(s) for Therapy: NA    Red Flags: Do you have any of the following? No  Fever/chills, unexplained weight changes, dizziness/fainting, unexplained change in bowel or bladder functions, unexplained malaise or muscle weakness, night pain/sweats, numbness or tingling    Objective:  Slump (-)  Leg Lowering (+) weak core 4-/5  Hip Scouring (-)    LUMBAR AROM  FF 30  EX 25  RSB 15  LSB 15    HIP AROM  R WNL  L WNL    Posture: WNL    Lower Extremity Functional Movements  Transfers: Ind  Gait: Ind    Outcome Measures:  Other Measures  Oswestry Disablity Index (JING): 19      EDUCATION: Pt educated in HEP, PT POC, anatomy, activity avoidance, proper positioning/posture, use of heat/cold , pt demonstrates understanding of PT info, all questions answered.      Goals: Set and discussed today  Increase ROM to WFL of LB  Increase Strength where deficits noted by 1/3 to 1 mm grade   Ind w/ HEP for LB to expedite progress and promote goal achievement for pt.    Improve Outcome score for evidence of improved function of LB.  Return to PLOF   Decrease pain to 2/10 or less      Plan of care was developed with input and agreement by the patient.    Treatment Performed:    Therapeutic Exercise:    8 min  Prone Lying 3'  REBECCA 15x  Prone Press Up 2x10  Prone SLR 2x10 BL  Seated PB FF 20x      Assessment: 25 y/o M presents with c/o back pain. Upon examination patient demonstrates mild pain limiting overall functional mobility including ADL's.  Pt to benefit from outpatient PT to address deficits, maximize functional mobility and improve QOL.  The clinical presentation of this patient is stable and their history and  examination findings are consistent with a low complexity evaluation with good rehab potential.            Plan:     Planned Interventions include: therapeutic exercise, self-care home management, manual therapy, therapeutic activities, gait training, neuromuscular coordination, vasopneumatic, dry needling, aquatic therapy  Frequency: 1-2x/wk  Duration: 6wks      Arjun Aquino PT

## 2025-03-03 ENCOUNTER — EVALUATION (OUTPATIENT)
Dept: PHYSICAL THERAPY | Facility: CLINIC | Age: 25
End: 2025-03-03
Payer: COMMERCIAL

## 2025-03-03 DIAGNOSIS — M54.40 CHRONIC LEFT-SIDED LOW BACK PAIN WITH SCIATICA, SCIATICA LATERALITY UNSPECIFIED: Primary | ICD-10-CM

## 2025-03-03 DIAGNOSIS — G89.29 CHRONIC LEFT-SIDED LOW BACK PAIN WITH SCIATICA, SCIATICA LATERALITY UNSPECIFIED: Primary | ICD-10-CM

## 2025-03-03 PROCEDURE — 97110 THERAPEUTIC EXERCISES: CPT | Mod: GP | Performed by: PHYSICAL THERAPIST

## 2025-03-03 PROCEDURE — 97161 PT EVAL LOW COMPLEX 20 MIN: CPT | Mod: GP | Performed by: PHYSICAL THERAPIST

## 2025-03-03 ASSESSMENT — PAIN - FUNCTIONAL ASSESSMENT: PAIN_FUNCTIONAL_ASSESSMENT: 0-10

## 2025-03-03 ASSESSMENT — PAIN SCALES - GENERAL: PAINLEVEL_OUTOF10: 8

## 2025-03-04 ENCOUNTER — TELEPHONE (OUTPATIENT)
Dept: PRIMARY CARE | Facility: CLINIC | Age: 25
End: 2025-03-04
Payer: COMMERCIAL

## 2025-03-04 DIAGNOSIS — R25.2 LEG CRAMPS: ICD-10-CM

## 2025-03-04 RX ORDER — IBUPROFEN 800 MG/1
800 TABLET ORAL
Qty: 30 TABLET | Refills: 0 | Status: SHIPPED | OUTPATIENT
Start: 2025-03-04

## 2025-03-11 ENCOUNTER — APPOINTMENT (OUTPATIENT)
Dept: RADIOLOGY | Facility: HOSPITAL | Age: 25
End: 2025-03-11
Payer: COMMERCIAL

## 2025-03-12 ENCOUNTER — TREATMENT (OUTPATIENT)
Dept: PHYSICAL THERAPY | Facility: CLINIC | Age: 25
End: 2025-03-12
Payer: COMMERCIAL

## 2025-03-12 DIAGNOSIS — M54.50 CHRONIC LOW BACK PAIN, UNSPECIFIED BACK PAIN LATERALITY, UNSPECIFIED WHETHER SCIATICA PRESENT: ICD-10-CM

## 2025-03-12 DIAGNOSIS — G89.29 CHRONIC LOW BACK PAIN, UNSPECIFIED BACK PAIN LATERALITY, UNSPECIFIED WHETHER SCIATICA PRESENT: ICD-10-CM

## 2025-03-12 DIAGNOSIS — M54.40 CHRONIC LEFT-SIDED LOW BACK PAIN WITH SCIATICA, SCIATICA LATERALITY UNSPECIFIED: Primary | ICD-10-CM

## 2025-03-12 DIAGNOSIS — G89.29 CHRONIC LEFT-SIDED LOW BACK PAIN WITH SCIATICA, SCIATICA LATERALITY UNSPECIFIED: Primary | ICD-10-CM

## 2025-03-12 PROCEDURE — 97110 THERAPEUTIC EXERCISES: CPT | Mod: GP,CQ

## 2025-03-12 ASSESSMENT — PAIN SCALES - GENERAL: PAINLEVEL_OUTOF10: 8

## 2025-03-12 ASSESSMENT — PAIN - FUNCTIONAL ASSESSMENT: PAIN_FUNCTIONAL_ASSESSMENT: 0-10

## 2025-03-12 ASSESSMENT — PAIN DESCRIPTION - DESCRIPTORS: DESCRIPTORS: ACHING

## 2025-03-12 NOTE — PROGRESS NOTES
"Physical Therapy Treatment    Patient Name: Facundo Thomas  MRN: [id number]  Today's Date: 3/12/2025  Time Calculation  Start Time: 0330  Stop Time: 0403  Time Calculation (min): 33 min     PT Therapeutic Procedures Time Entry  Therapeutic Exercise Time Entry: 33                 Insurance:   COPAY 0 DED 0  Visit number: 2 of 9  Authorization info: NO AUTH REQ  Insurance Type: [employer] [id number] THEN AUTH IS REQ       General:  Reason for visit: LBP  Referred by: Cole C Budinsky, MD      Assessment:  More focus on Neutral spine ex's and slight flexion biased ex's dt patients mod increased pain following last visit and new onset R leg weakness and shakiness. Progressed his ex's as listed in treatment section w/ emphasis on slow pacing and TA holds. Pt w/ significant R HS tightness noted w/ addition of stretches in supine today. Overall post-session he had a slight decrease in his symptoms. Encouraged him to pay attention to symptoms later today and tomorrow and report at next appt. If he felt better we will update his HEP NV.     From Xrays taken yesterday at ER: Findings: Minimal degenerative disc space narrowing is noted at L1-L2, L2-L3, and L3-L4.     Plan:   Cont to progress as denisa. Update HEP as denisa NV.     Current Problem  1. Chronic left-sided low back pain with sciatica, sciatica laterality unspecified        2. Chronic low back pain, unspecified back pain laterality, unspecified whether sciatica present  Follow Up In Physical Therapy          Subjective   General   Pt went to ER yesterday and had xrays because of some new symptoms he has been experiencing. Pain increased and also has started to experience \"shaking\" in the R leg. L leg gets \"spasms\" Pt states he was in \"a lot\" of pain after the last PT session.   Precautions       Pain  Pain Assessment: 0-10  0-10 (Numeric) Pain Score: 8  Pain Type: Chronic pain  Pain Location: Back  Pain Orientation: Left, Mid  Pain Descriptors: Aching  Pain Frequency: " "Constant/continuous    Objective       Treatments:  Therapeutic Exercise (06047): UPDATE HEP NV   Prone Lying 3' --  REBECCA 15x --  Prone Press Up 2x10 --  Prone SLR 2x10 BL --  Seated PB FF 20x  TA w/ Hands push into plinth 3\" x 10   TA w/ Marching x10  TA w/ Hip add 5\" x 10  TA w/ Hip abd GTB 5\" x 10  Supine HS Stretch w/ Strap 30\" x 3 BL  Supine Piriformis Stretch (KTOS) 30\" x 2 BL       EDUCATION:     Goals:   Increase ROM to WFL of LB  Increase Strength where deficits noted by 1/3 to 1 mm grade   Ind w/ HEP for LB to expedite progress and promote goal achievement for pt.    Improve Outcome score for evidence of improved function of LB.  Return to PLOF   Decrease pain to 2/10 or less    "

## 2025-03-17 ENCOUNTER — TREATMENT (OUTPATIENT)
Dept: PHYSICAL THERAPY | Facility: CLINIC | Age: 25
End: 2025-03-17
Payer: COMMERCIAL

## 2025-03-17 DIAGNOSIS — G89.29 CHRONIC LEFT-SIDED LOW BACK PAIN WITH SCIATICA, SCIATICA LATERALITY UNSPECIFIED: Primary | ICD-10-CM

## 2025-03-17 DIAGNOSIS — M54.40 CHRONIC LEFT-SIDED LOW BACK PAIN WITH SCIATICA, SCIATICA LATERALITY UNSPECIFIED: Primary | ICD-10-CM

## 2025-03-17 DIAGNOSIS — M54.50 CHRONIC LOW BACK PAIN, UNSPECIFIED BACK PAIN LATERALITY, UNSPECIFIED WHETHER SCIATICA PRESENT: ICD-10-CM

## 2025-03-17 DIAGNOSIS — G89.29 CHRONIC LOW BACK PAIN, UNSPECIFIED BACK PAIN LATERALITY, UNSPECIFIED WHETHER SCIATICA PRESENT: ICD-10-CM

## 2025-03-17 PROCEDURE — 97110 THERAPEUTIC EXERCISES: CPT | Mod: GP,CQ

## 2025-03-17 ASSESSMENT — PAIN SCALES - GENERAL: PAINLEVEL_OUTOF10: 6

## 2025-03-17 ASSESSMENT — PAIN - FUNCTIONAL ASSESSMENT: PAIN_FUNCTIONAL_ASSESSMENT: 0-10

## 2025-03-17 ASSESSMENT — PAIN DESCRIPTION - DESCRIPTORS: DESCRIPTORS: ACHING

## 2025-03-17 NOTE — PROGRESS NOTES
"Physical Therapy Treatment    Patient Name: Facundo Thomas  MRN: [id number]  Today's Date: 3/17/2025  Time Calculation  Start Time: 0325  Stop Time: 0405  Time Calculation (min): 40 min     PT Therapeutic Procedures Time Entry  Therapeutic Exercise Time Entry: 40                 Insurance:   COPAY 0 DED 0  Visit number: 3 of 9  Authorization info: NO AUTH REQ  Insurance Type: [employer] 30V THEN AUTH IS REQ       General:  Reason for visit: LBP  Referred by: Cole C Budinsky, MD   Assessment:   Resumed ex's as performed last visit w/ pt demo'ing good overall good ability to complete them. Reviewed pacing and holds as well as increased TA activation to help improve his core stability/strength. Pt presented w/ poor hip mobility and difficulty w/ initiation of Clamshells today (L > R). His HS Stretch was also tight however did appear to be improved from last visit. Added Fig 4 stretch this visit to help improve LE flexibility and decrease his LBP.   Updated pt's HEP this visit w/ pt demo'ing good understanding of.     Plan:    Cont to progress as denisa.     Current Problem  1. Chronic left-sided low back pain with sciatica, sciatica laterality unspecified        2. Chronic low back pain, unspecified back pain laterality, unspecified whether sciatica present  Follow Up In Physical Therapy          Subjective   General   Pt states it wasn't as sore after last session as the visit before but the pain did come back. \"Friday I was miserable and had to lay in bed.\"   Pain at worst since last visit 10/10  Pain at it's best since last visit 4/10    Precautions   None    Pain  Pain Assessment: 0-10  0-10 (Numeric) Pain Score: 6  Pain Type: Chronic pain  Pain Location: Back  Pain Orientation: Left, Mid  Pain Descriptors: Aching  Pain Frequency: Constant/continuous    Objective       Treatments:  Therapeutic Exercise (73163):    Prone Lying 3' --  REBECCA 15x --`  Prone Press Up 2x10 --  Prone SLR 2x10 BL   Seated PB FF 20x  *TA " "w/ Hands push into plinth 5\" x 15  *TA w/ Marching x10   *TA w/ Hip add 5\" x 15  *TA w/ Hip abd BlueTB 5\" x 20  *Supine HS Stretch w/ Strap 30\" x 3 BL  *Supine Piriformis Stretch (KTOS) 30\" x 2 BL  *Supine Fig 4 Stretch 30\" x 2 BL  Clamshells x10 BL  (*Added to HEP)     EDUCATION:   Access Code: [email or web address]  URL: [email or web address]  Date: 03/17/2025  Prepared by: Dajuan Haq    Exercises  - Supine Posterior Pelvic Tilt  - 1 x daily - 7 x weekly - 1 sets - 10 reps - 5 second hold  - Hooklying Clamshell with Resistance  - 1 x daily - 7 x weekly - 2 sets - 10 reps - 5 second hold  - Supine Hip Adduction Isometric with Ball  - 1 x daily - 7 x weekly - 2 sets - 10 reps - 5 second hold  - Supine March  - 1 x daily - 7 x weekly - 2 sets - 10 reps  - Supine Hamstring Stretch with Strap  - 1 x daily - 7 x weekly - 1 sets - 2-3 reps - 30 second hold  - Supine Piriformis Stretch with Foot on Ground  - 1 x daily - 7 x weekly - 1 sets - 2-3 reps - 30 second hold  - Supine Figure 4 Piriformis Stretch  - 1 x daily - 7 x weekly - 1 sets - 2-3 reps - 30 second hold      "

## 2025-03-19 ENCOUNTER — OFFICE VISIT (OUTPATIENT)
Dept: PRIMARY CARE | Facility: CLINIC | Age: 25
End: 2025-03-19
Payer: COMMERCIAL

## 2025-03-19 VITALS
TEMPERATURE: 97.8 F | BODY MASS INDEX: 27.35 KG/M2 | SYSTOLIC BLOOD PRESSURE: 112 MMHG | HEART RATE: 72 BPM | DIASTOLIC BLOOD PRESSURE: 73 MMHG | RESPIRATION RATE: 20 BRPM | OXYGEN SATURATION: 97 % | WEIGHT: 174.6 LBS

## 2025-03-19 DIAGNOSIS — R11.2 NAUSEA AND VOMITING IN ADULT: Primary | ICD-10-CM

## 2025-03-19 DIAGNOSIS — J02.9 SORE THROAT: ICD-10-CM

## 2025-03-19 PROCEDURE — 99214 OFFICE O/P EST MOD 30 MIN: CPT | Performed by: NURSE PRACTITIONER

## 2025-03-19 PROCEDURE — 87804 INFLUENZA ASSAY W/OPTIC: CPT | Performed by: NURSE PRACTITIONER

## 2025-03-19 PROCEDURE — 87811 SARS-COV-2 COVID19 W/OPTIC: CPT | Performed by: NURSE PRACTITIONER

## 2025-03-19 PROCEDURE — 87880 STREP A ASSAY W/OPTIC: CPT | Performed by: NURSE PRACTITIONER

## 2025-03-19 RX ORDER — ONDANSETRON 4 MG/1
4 TABLET, ORALLY DISINTEGRATING ORAL EVERY 8 HOURS PRN
Qty: 6 TABLET | Refills: 0 | Status: SHIPPED | OUTPATIENT
Start: 2025-03-19 | End: 2025-03-21

## 2025-03-19 ASSESSMENT — ENCOUNTER SYMPTOMS
HEADACHES: 1
SORE THROAT: 1
APPETITE CHANGE: 1
VOMITING: 1
ABDOMINAL PAIN: 0
SHORTNESS OF BREATH: 0
RHINORRHEA: 0
FEVER: 0
CHEST TIGHTNESS: 0
FATIGUE: 1
NAUSEA: 1
MYALGIAS: 0
DIARRHEA: 1
WHEEZING: 0
CHILLS: 1

## 2025-03-19 NOTE — PROGRESS NOTES
Patient says that yesterday, he had chicken from a convenience store. In the middle of the night, he vomited four or five times. He vomited this morning at 6:30 am. Pt has a slight sore throat. Patient did not take anything for his symptoms.     Review of Systems   Constitutional:  Positive for appetite change, chills and fatigue. Negative for fever.   HENT:  Positive for sore throat. Negative for congestion and rhinorrhea.    Respiratory:  Negative for chest tightness, shortness of breath and wheezing.    Cardiovascular:  Negative for chest pain.   Gastrointestinal:  Positive for diarrhea, nausea and vomiting. Negative for abdominal pain.   Musculoskeletal:  Negative for myalgias.   Neurological:  Positive for headaches.     Objective   /73   Pulse 72   Temp 36.6 °C (97.8 °F) (Tympanic)   Resp 20   Wt 79.2 kg (174 lb 9.6 oz)   SpO2 97%   BMI 27.35 kg/m²     Physical Exam  Vitals reviewed.   Constitutional:       General: He is not in acute distress.     Appearance: Normal appearance. He is not ill-appearing or toxic-appearing.   HENT:      Head: Atraumatic.      Mouth/Throat:      Mouth: Mucous membranes are moist.      Pharynx: Oropharynx is clear. No oropharyngeal exudate or posterior oropharyngeal erythema.   Eyes:      Conjunctiva/sclera: Conjunctivae normal.   Cardiovascular:      Rate and Rhythm: Normal rate and regular rhythm.      Heart sounds: Normal heart sounds. No murmur heard.  Pulmonary:      Effort: Pulmonary effort is normal.      Breath sounds: Normal breath sounds. No wheezing or rhonchi.   Abdominal:      General: Bowel sounds are normal. There is no distension.      Palpations: Abdomen is soft.      Tenderness: There is no abdominal tenderness. There is no guarding or rebound.   Skin:     General: Skin is warm and dry.   Neurological:      General: No focal deficit present.      Mental Status: He is alert.   Psychiatric:         Mood and Affect: Mood normal.     Assessment/Plan    Problem List Items Addressed This Visit    None  Visit Diagnoses         Codes    Nausea and vomiting in adult    -  Primary R11.2    Relevant Medications    ondansetron ODT (Zofran-ODT) 4 mg disintegrating tablet    Sore throat     J02.9    Relevant Orders    POCT rapid strep A manually resulted (Completed)    POCT Influenza A/B manually resulted (Completed)    POCT BinaxNOW Covid-19 Ag Card manually resulted (Completed)    Group A Streptococcus, PCR    Sars-CoV-2 and Influenza A/B PCR        Rapid strep, flu and covid are negative. Will get PCR COVID, flu and strep testing done. Patient advised that his symptoms are likely related to food born illness/viral gastroenteritis. Advised that he is to stay well hydrated. Prescribed zofran to use as needed for nausea. Pt to eat a bland, BRAT diet. Advised ER for any worsening vomiting, diarrhea, s/s of dehydration or new/concerning symptoms; she agreed.

## 2025-03-20 ENCOUNTER — TELEPHONE (OUTPATIENT)
Dept: PRIMARY CARE | Facility: CLINIC | Age: 25
End: 2025-03-20
Payer: COMMERCIAL

## 2025-03-20 LAB
FLUAV RNA RESP QL NAA+PROBE: NOT DETECTED
FLUBV RNA RESP QL NAA+PROBE: NOT DETECTED
S PYO DNA THROAT QL NAA+PROBE: NOT DETECTED
SARS-COV-2 RNA RESP QL NAA+PROBE: NOT DETECTED

## 2025-03-20 NOTE — TELEPHONE ENCOUNTER
Spoke to patient this morning and relayed results of negative covid and flu testing. Patient is feeling better. No further questions per pt at this time

## 2025-03-24 ENCOUNTER — TREATMENT (OUTPATIENT)
Dept: PHYSICAL THERAPY | Facility: CLINIC | Age: 25
End: 2025-03-24
Payer: COMMERCIAL

## 2025-03-24 DIAGNOSIS — G89.29 CHRONIC LEFT-SIDED LOW BACK PAIN WITH SCIATICA, SCIATICA LATERALITY UNSPECIFIED: Primary | ICD-10-CM

## 2025-03-24 DIAGNOSIS — M54.50 CHRONIC LOW BACK PAIN, UNSPECIFIED BACK PAIN LATERALITY, UNSPECIFIED WHETHER SCIATICA PRESENT: ICD-10-CM

## 2025-03-24 DIAGNOSIS — G89.29 CHRONIC LOW BACK PAIN, UNSPECIFIED BACK PAIN LATERALITY, UNSPECIFIED WHETHER SCIATICA PRESENT: ICD-10-CM

## 2025-03-24 DIAGNOSIS — M54.40 CHRONIC LEFT-SIDED LOW BACK PAIN WITH SCIATICA, SCIATICA LATERALITY UNSPECIFIED: Primary | ICD-10-CM

## 2025-03-24 PROCEDURE — 97110 THERAPEUTIC EXERCISES: CPT | Mod: GP,CQ

## 2025-03-24 ASSESSMENT — PAIN DESCRIPTION - DESCRIPTORS: DESCRIPTORS: ACHING

## 2025-03-24 ASSESSMENT — PAIN - FUNCTIONAL ASSESSMENT: PAIN_FUNCTIONAL_ASSESSMENT: 0-10

## 2025-03-24 ASSESSMENT — PAIN SCALES - GENERAL: PAINLEVEL_OUTOF10: 6

## 2025-03-24 NOTE — PROGRESS NOTES
"Physical Therapy Treatment    Patient Name: Facundo Thomas  MRN: [id number]  Today's Date: 3/24/2025  Time Calculation  Start Time: 0416  Stop Time: 0455  Time Calculation (min): 39 min     PT Therapeutic Procedures Time Entry  Therapeutic Exercise Time Entry: 39                 Insurance:   COPAY 0 DED 0  Visit number: 4 of 9  Authorization info: NO AUTH REQ  Insurance Type: [employer] MED[employer] 30V THEN AUTH IS REQ      General:  Reason for visit: LBP  Referred by: Cole C Budinsky, MD     Assessment:   Progressed pt's program today w/ addition of PB Roll to R and L side. He had more pain when rolling to R side however could complete reps (5). Progressed his program today w/ addition of Bridges w/o pain c/o. Good denisa to given stretches w/ min tightness c/o. He will cont to benefit from skilled PT to address his deficits and improve his overall functional ability.      Plan:   Cont to progress as denisa.     Current Problem  1. Chronic left-sided low back pain with sciatica, sciatica laterality unspecified        2. Chronic low back pain, unspecified back pain laterality, unspecified whether sciatica present  Follow Up In Physical Therapy          Subjective   General   Pt reports the exercises \"were starting to help\" and then he started to have pain again. Pt reports compliance w/ HEP.   6/10 pain coming in to appt.  Precautions       Pain  Pain Assessment: 0-10  0-10 (Numeric) Pain Score: 6  Pain Type: Chronic pain  Pain Location: Back  Pain Orientation: Left, Mid  Pain Descriptors: Aching  Pain Frequency: Constant/continuous    Objective       Treatments:     Therapeutic Exercise (09994):    Prone Lying 3' --  REBECCA 15x --  Prone Press Up 2x10 --  Prone SLR x10 BL   Seated PB FF 5\" x 15   Seated PB R/L 5\" x 5 ea  *TA w/ Hands push into plinth 5\" x 15  *TA w/ Marching x10   *TA w/ Hip add 5\" x 15  *TA w/ Hip abd BlueTB 5\" x 20   *Supine HS Stretch w/ Strap 30\" x 3 BL  *Supine Piriformis Stretch (KTOS) 30\" x 2 BL  *Supine " "Fig 4 Stretch 30\" x 2 BL --  Clamshells x10 BL  Bridges x10   (*Added to HEP)     EDUCATION:         "

## 2025-03-25 ENCOUNTER — TELEPHONE (OUTPATIENT)
Dept: PRIMARY CARE | Facility: CLINIC | Age: 25
End: 2025-03-25

## 2025-03-25 ENCOUNTER — TELEMEDICINE (OUTPATIENT)
Dept: PRIMARY CARE | Facility: CLINIC | Age: 25
End: 2025-03-25
Payer: COMMERCIAL

## 2025-03-25 DIAGNOSIS — M54.50 CHRONIC LOW BACK PAIN, UNSPECIFIED BACK PAIN LATERALITY, UNSPECIFIED WHETHER SCIATICA PRESENT: Primary | ICD-10-CM

## 2025-03-25 DIAGNOSIS — G89.29 CHRONIC LOW BACK PAIN, UNSPECIFIED BACK PAIN LATERALITY, UNSPECIFIED WHETHER SCIATICA PRESENT: Primary | ICD-10-CM

## 2025-03-25 PROCEDURE — 99213 OFFICE O/P EST LOW 20 MIN: CPT | Performed by: FAMILY MEDICINE

## 2025-03-25 ASSESSMENT — ENCOUNTER SYMPTOMS
WEAKNESS: 0
NUMBNESS: 0
BACK PAIN: 1

## 2025-03-25 NOTE — PROGRESS NOTES
Subjective   Patient ID: Facundo Thomas is a 24 y.o. male who presents for Back Pain.    Back Pain  Quality: Shoots down left side of body. The pain is at a severity of 10/10. Pertinent negatives include no numbness or weakness.        Virtual or Telephone Consent    An interactive audio and video telecommunication system which permits real time communications between the patient (at the originating site) and provider (at the distant site) was utilized to provide this telehealth service.   Verbal consent was requested and obtained from Facundo Thomas on this date, 03/25/25 for a telehealth visit and the patient's location was confirmed at the time of the visit.      He is here today to discuss chronic back pain  He has overall had low back pain which has been present for greater than 1 year.  This started to get worse in December  He has been following with orthopedics and has been doing physical therapy over the last few weeks.  He has 3 sessions left.  He has noticed that after each physical therapy session, he will develop severe pain in his back and will have trouble walking due to the pain.  His most recent PT session was yesterday.  Afterwards he developed a 10 out of 10 pain  Pain is aching in quality and gets worse with sitting and standing.  It is located in his middle and left low back.  No radiation to lower extremities.  No lower extremity numbness or weakness.  No loss of bowel or bladder control  He is going to be doing a few more sessions of physical therapy, and then orthopedics is discussing ordering an MRI  He is currently taking ibuprofen with only partial improvement  We had given him prednisone in the past, which did not help  He did go to the ER on 3/11 with back pain.  He had a lumbar x-ray done which did not show any acute fracture or traumatic subluxation, but did show mild degenerative changes.  At that time he was given a lidocaine patch and Flexeril      Review of Systems   Musculoskeletal:   Positive for back pain.   Neurological:  Negative for weakness and numbness.       Objective   There were no vitals taken for this visit.    Physical Exam  Constitutional:       General: He is not in acute distress.     Appearance: Normal appearance. He is well-developed.   Pulmonary:      Effort: Pulmonary effort is normal.   Skin:     Findings: No rash.   Neurological:      Mental Status: He is alert.   Psychiatric:         Mood and Affect: Mood normal.         Behavior: Behavior normal.         Assessment/Plan   Assessment & Plan  Chronic low back pain, unspecified back pain laterality, unspecified whether sciatica present    Orders:    Referral to Pain Medicine; Future    He has continued to have back pain which has been worsening over the past several months.  This has not been getting better with physical therapy, and he has tried several medications including lidocaine patches, NSAIDs, Flexeril and prednisone without much improvement.  I did recommend that he continue physical therapy and continue to follow with orthopedics, and they are discussing having him get an MRI of his lumbar spine.  He is also interested in establishing with pain management was given a referral today.  Follow-up if any persistent symptoms after seeing specialist

## 2025-03-25 NOTE — TELEPHONE ENCOUNTER
Pt called in , he states he had Physical Therapy yesterday and his back is hurting worse than it did yesterday and he cannot move, he is requesting a virtual visit today if you have an opening.

## 2025-03-25 NOTE — LETTER
March 25, 2025     Patient: Facundo Thomas   YOB: 2000   Date of Visit: 3/25/2025       To Whom It May Concern:    Facundo Thomas was seen in my clinic on 3/25/2025. Please excuse Facundo for his absence from work on 03/25/25    If you have any questions or concerns, please don't hesitate to call.         Sincerely,         Дмитрий Bill, DO

## 2025-03-26 ENCOUNTER — TELEPHONE (OUTPATIENT)
Dept: PRIMARY CARE | Facility: CLINIC | Age: 25
End: 2025-03-26
Payer: COMMERCIAL

## 2025-03-26 DIAGNOSIS — G89.29 CHRONIC LOW BACK PAIN, UNSPECIFIED BACK PAIN LATERALITY, UNSPECIFIED WHETHER SCIATICA PRESENT: Primary | ICD-10-CM

## 2025-03-26 DIAGNOSIS — M54.50 CHRONIC LOW BACK PAIN, UNSPECIFIED BACK PAIN LATERALITY, UNSPECIFIED WHETHER SCIATICA PRESENT: Primary | ICD-10-CM

## 2025-03-26 RX ORDER — PREDNISONE 50 MG/1
50 TABLET ORAL DAILY
Qty: 5 TABLET | Refills: 0 | Status: SHIPPED | OUTPATIENT
Start: 2025-03-26 | End: 2025-03-31

## 2025-03-26 NOTE — TELEPHONE ENCOUNTER
Patient called and states the muscle relaxer isnt helping.    He woke up in severe pain      [phone]    Also needs a work note for today

## 2025-03-26 NOTE — TELEPHONE ENCOUNTER
I spoke with him over the phone.  We did a virtual visit with him yesterday for back pain.  He has had back pain which has been getting worse over the past 3 months.  At his visit yesterday he had mentioned that he was developing severe pain in his lower back which would get worse after PT.  He states that he has continued to have pain in his lower back today.  This is the same as the pain that he has been having over the past several months.  He has not had any weakness or numbness in his legs.  No loss of bowel or bladder control.  I did send in prednisone for him to help with his back pain.  He has already been referred to pain management.  I recommended that he contact orthopedics if he continues to have any significant pain.  Advised ER if pain is severe or worsening

## 2025-03-26 NOTE — LETTER
March 26, 2025     Patient: Facundo Thomas   YOB: 2000   Date of Visit: 3/26/2025       To Whom It May Concern:    Facundo Thomas was seen in my clinic on 3/26/2025. Please excuse Facundo for his absence from work on this day to make the appointment.    If you have any questions or concerns, please don't hesitate to call.         Sincerely,         Дмитрий Bill, DO

## 2025-03-27 NOTE — PROGRESS NOTES
"Physical Therapy Treatment    Patient Name: Facundo Thomas  MRN: [id number]  Today's Date: 3/31/2025  Time Calculation  Start Time: 0419  Stop Time: 0444  Time Calculation (min): 25 min     PT Therapeutic Procedures Time Entry  Therapeutic Exercise Time Entry: 23                 Current Problem  1. Chronic left-sided low back pain with sciatica, sciatica laterality unspecified        2. Chronic low back pain, unspecified back pain laterality, unspecified whether sciatica present  Follow Up In Physical Therapy          Insurance:  COPAY 0 DED 0  Visit number: 5of 9  Authorization info: NO AUTH REQ  Insurance Type: [facility] MEDICAID 30V THEN AUTH IS REQ       Precautions   Per MR    Subjective   Subjective:   Pt reports that his pain is located in the L low back. Last week was horrible with pain . He went to ER and got a steroid injection and was given tordol, but he got sick with vomiting, so doesn't really know about taking it again. Pt feels pain down his L LE at times. Pt tries sleeping in the R side, before he tosses and turns.   Pain   5/10    Objective   Treatments:   Therapeutic Exercise ([id number]):    Prone Lying 3' --  REBECCA 15x --  Prone Press Up 2x10 --  Seated TA bracing 5\"x15  Seated hip add 5\"x15  Standing UE push into PB 5\"x20  Standing row/ext RTB   Standing antirotations YTB B 10x ea  Sit to stands 10x    Not Today:  Prone SLR x10 BL   Seated PB FF 5\" x 15   Seated PB R/L 5\" x 5 ea  *TA w/ Hands push into plinth 5\" x 15  *TA w/ Marching x10   *TA w/ Hip add 5\" x 15  *TA w/ Hip abd BlueTB 5\" x 20   *Supine HS Stretch w/ Strap 30\" x 3 BL  *Supine Piriformis Stretch (KTOS) 30\" x 2 BL  *Supine Fig 4 Stretch 30\" x 2 BL --  Clamshells x10 BL  Bridges x10   (*Added to HEP)   OP EDUCATION:   Correct technique when bending over to pick things up. Correct sitting posture      Assessment:   Performed more exercises in sitting vs supine today, due to less pain in sitting position. Added more thoracic strengthening ex " to achieve better posture. Cues given for scap retraction. Pt able to maintain good posture with anti-rotations. Verbal cues given to not flex fwd at waist during sit to stands.     Plan:   Cont with core stabilization. Pt goes to pain main management tomorrow

## 2025-03-28 ENCOUNTER — TELEPHONE (OUTPATIENT)
Dept: PRIMARY CARE | Facility: CLINIC | Age: 25
End: 2025-03-28
Payer: COMMERCIAL

## 2025-03-28 NOTE — TELEPHONE ENCOUNTER
Pt asked if his dosage on his ARIPiprazole (Abilify) 10 mg tablet [[id number]]  could be raised he has stated that his depression has worsened and feels he needs a stronger dose. He has also asked that the medication be sent in to the pharmacy with the new dosage.     Rx Refill Request Telephone Encounter    Name:  Facundo Thomas  :  [id number]  Medication Name:  ARIPiprazole (Abilify) 10 mg tablet [[id number]]             Specific Pharmacy location:    [facility] - [place], [place] - [address]  [address], [facility] [address]  Phone: [phone]  Fax: [phone]  BERNARDA #: --       Best number to reach patient:  [phone]

## 2025-03-31 ENCOUNTER — APPOINTMENT (OUTPATIENT)
Dept: PRIMARY CARE | Facility: CLINIC | Age: 25
End: 2025-03-31
Payer: COMMERCIAL

## 2025-03-31 ENCOUNTER — TREATMENT (OUTPATIENT)
Dept: PHYSICAL THERAPY | Facility: CLINIC | Age: 25
End: 2025-03-31
Payer: COMMERCIAL

## 2025-03-31 DIAGNOSIS — G89.29 CHRONIC LOW BACK PAIN, UNSPECIFIED BACK PAIN LATERALITY, UNSPECIFIED WHETHER SCIATICA PRESENT: ICD-10-CM

## 2025-03-31 DIAGNOSIS — M54.50 CHRONIC LOW BACK PAIN, UNSPECIFIED BACK PAIN LATERALITY, UNSPECIFIED WHETHER SCIATICA PRESENT: ICD-10-CM

## 2025-03-31 DIAGNOSIS — M54.40 CHRONIC LEFT-SIDED LOW BACK PAIN WITH SCIATICA, SCIATICA LATERALITY UNSPECIFIED: Primary | ICD-10-CM

## 2025-03-31 DIAGNOSIS — G89.29 CHRONIC LEFT-SIDED LOW BACK PAIN WITH SCIATICA, SCIATICA LATERALITY UNSPECIFIED: Primary | ICD-10-CM

## 2025-03-31 DIAGNOSIS — F32.9 MAJOR DEPRESSIVE DISORDER, REMISSION STATUS UNSPECIFIED, UNSPECIFIED WHETHER RECURRENT: Primary | ICD-10-CM

## 2025-03-31 PROCEDURE — 97110 THERAPEUTIC EXERCISES: CPT | Mod: GP,CQ

## 2025-03-31 RX ORDER — ARIPIPRAZOLE 15 MG/1
15 TABLET ORAL DAILY
Qty: 30 TABLET | Refills: 5 | Status: SHIPPED | OUTPATIENT
Start: 2025-03-31

## 2025-03-31 ASSESSMENT — ENCOUNTER SYMPTOMS: DEPRESSION: 1

## 2025-03-31 NOTE — PROGRESS NOTES
Subjective   Patient ID: Facundo Thomas is a 24 y.o. male who presents for No chief complaint on file..    Depression  Visit Type: follow-up  Frequency of symptoms: most days         Virtual or Telephone Consent    While technically available, the patient was unable or unwilling to consent to connect via audio/video telehealth technology; therefore, I performed this visit using a real-time audio only connection between Facundo Thomas & Дмитрий Bill DO.  Verbal consent was requested and obtained from Facundo Thomas on this date, 03/31/25 for a telehealth visit and the patient's location was confirmed at the time of the visit.    He is here today for follow-up on depression.  He is interested in increasing his dose of aripiprazole  He has a history of MDD and bipolar disorder currently taking aripiprazole 10 mg daily.  He has been on this dose for approximately 1 year  Over the past 2 weeks he has had worsening depression symptoms.  He admits to feeling more tearful.  He has had fatigue and energy loss, loss of interest, trouble sleeping, decreased appetite and loss of motivation.  He has been more irritable.  Denies any recent anxiety symptoms  He states that he will not get manic episodes that often, but last week did have a night where he had trouble sleeping  He feels that the aripiprazole has been helping and wants to try to increase the dose      Review of Systems   Psychiatric/Behavioral:  Positive for depression.        Objective   There were no vitals taken for this visit.    Physical Exam    Assessment/Plan   Assessment & Plan  Major depressive disorder, remission status unspecified, unspecified whether recurrent    Orders:    ARIPiprazole (Abilify) 15 mg tablet; Take 1 tablet (15 mg) by mouth once daily.    He presents today with a 2-week history of worsening depression symptoms.  He feels that his Abilify has been helping, and is interested in increasing the dose.  We will increase his dose from 10 to 15 mg  daily and follow-up in 1 month for recheck

## 2025-04-01 ENCOUNTER — APPOINTMENT (OUTPATIENT)
Facility: CLINIC | Age: 25
End: 2025-04-01
Payer: COMMERCIAL

## 2025-04-01 VITALS
DIASTOLIC BLOOD PRESSURE: 87 MMHG | HEIGHT: 67 IN | SYSTOLIC BLOOD PRESSURE: 146 MMHG | BODY MASS INDEX: 27.47 KG/M2 | WEIGHT: 175 LBS | HEART RATE: 71 BPM

## 2025-04-01 DIAGNOSIS — M54.50 CHRONIC LOW BACK PAIN, UNSPECIFIED BACK PAIN LATERALITY, UNSPECIFIED WHETHER SCIATICA PRESENT: ICD-10-CM

## 2025-04-01 DIAGNOSIS — G89.29 CHRONIC LOW BACK PAIN, UNSPECIFIED BACK PAIN LATERALITY, UNSPECIFIED WHETHER SCIATICA PRESENT: ICD-10-CM

## 2025-04-01 PROCEDURE — 3008F BODY MASS INDEX DOCD: CPT | Performed by: PAIN MEDICINE

## 2025-04-01 PROCEDURE — 99204 OFFICE O/P NEW MOD 45 MIN: CPT | Performed by: PAIN MEDICINE

## 2025-04-01 ASSESSMENT — PAIN - FUNCTIONAL ASSESSMENT: PAIN_FUNCTIONAL_ASSESSMENT: 0-10

## 2025-04-01 ASSESSMENT — PAIN SCALES - GENERAL: PAINLEVEL_OUTOF10: 7

## 2025-04-01 NOTE — PROGRESS NOTES
Subjective   Patient ID: Facundo Thomas is a 24 y.o. male with a past medical history of   Past Medical History:   Diagnosis Date    Acute bronchitis 09/18/2024    Allergic     Ankle pain 09/18/2024    Asthma     Contact with and (suspected) exposure to covid-19 02/21/2023    Cough, unspecified 12/09/2013    Cough    Cough, unspecified 08/22/2022    Depression     Diarrhea 09/18/2024    Disease due to severe acute respiratory syndrome coronavirus 2 (SARS-CoV-2) 08/22/2022    Comment on above: COVID + WEAKNESS     LOW BACK PAIN, NKI COVID + 2 MONTHS AGO      Eczema     Generalized pain 09/18/2024    Nasal congestion 09/18/2024    Nausea and vomiting 01/22/2019    Comment on above: VOMITING      Pain of hand 09/18/2024    Pain, unspecified 08/19/2022    Personal history of other diseases of the respiratory system 03/09/2016    History of asthma    Right lower quadrant abdominal pain 05/09/2018    Sore throat 10/04/2018    Comment on above: Added by Problem List Migration; 2013-3-29; Moved to [facility] Nov 24 2013 4:44PM;      Tinea corporis 09/18/2024            HPI:   24M who presents as a new patient for low back pain. Patient reports his low back pain began in 2022 while he was trying to lift a heavy box. Since that time patient has had left lower back pain. Pain is dull and achy and radiates down the front of the leg and terminates at the knee. Patient has been referred to physical therapy but reports that this does not help him and even makes the pain worse. Patient continues to work, although at a different job than the pain occurs. He has tried multiple medications for the pain including toradol, oxycodone, ibuprofen, and cyclobenzaprine. Toradol helped patient but he reports it gave him GI issues so he stopped taking. Patient has a referral for a lumbar MRI but due to insurance, he must complete PT before they will cover the MRI.  Pain Assessment: 0-10  0-10 (Numeric) Pain Score: 7  Pain Location: Back        Review of Systems   13-point ROS done and negative except for HPI.     Current Outpatient Medications   Medication Instructions    albuterol 90 mcg/actuation inhaler 2 puffs, inhalation, Every 4 hours PRN    ARIPiprazole (ABILIFY) 15 mg, oral, Daily    cetirizine (ZYRTEC) 10 mg, oral, Daily PRN    cyclobenzaprine (FLEXERIL) 10 mg, oral, 2 times daily PRN    EPINEPHrine (EPIPEN) 0.3 mg, intramuscular, Once    fluticasone (Flonase) 50 mcg/actuation nasal spray 2 sprays, Each Nostril, Daily, Shake gently. Before first use, prime pump. After use, clean tip and replace cap.    ibuprofen 800 mg, oral, Every 6 hours during day    lidocaine (Lidoderm) 5 % patch 1 patch, transdermal, Daily, Remove & discard patch within 12 hours or as directed by MD.    ondansetron (ZOFRAN) 4 mg, oral, Every 8 hours PRN       Past Medical History:   Diagnosis Date    Acute bronchitis 09/18/2024    Allergic     Ankle pain 09/18/2024    Asthma     Contact with and (suspected) exposure to covid-19 02/21/2023    Cough, unspecified 12/09/2013    Cough    Cough, unspecified 08/22/2022    Depression     Diarrhea 09/18/2024    Disease due to severe acute respiratory syndrome coronavirus 2 (SARS-CoV-2) 08/22/2022    Comment on above: COVID + WEAKNESS     LOW BACK PAIN, NKI COVID + 2 MONTHS AGO      Eczema     Generalized pain 09/18/2024    Nasal congestion 09/18/2024    Nausea and vomiting 01/22/2019    Comment on above: VOMITING      Pain of hand 09/18/2024    Pain, unspecified 08/19/2022    Personal history of other diseases of the respiratory system 03/09/2016    History of asthma    Right lower quadrant abdominal pain 05/09/2018    Sore throat 10/04/2018    Comment on above: Added by Problem List Migration; 2013-3-29; Moved to [facility] Nov 24 2013 4:44PM;      Tinea corporis 09/18/2024        Past Surgical History:   Procedure Laterality Date    CIRCUMCISION, PRIMARY  10/10/2013    Elective Circumcision    OTHER SURGICAL HISTORY  12/10/2019     Oral surgery    WISDOM TOOTH EXTRACTION          Family History   Problem Relation Name Age of Onset    Diabetes Father Erika sarah         Allergies   Allergen Reactions    Hazelnut Anaphylaxis    Amoxicillin-Pot Clavulanate Hives    Cephalosporins Hives    Gentamicin Hives    Penicillins Hives    Red Dye Unknown    Acetaminophen Hives and Rash     Rash    Only liquid form        Objective     Vitals:    04/01/25 1300   BP: 146/87   Pulse: 71        Physical Exam  General: NAD, well groomed, well nourished  Eyes: Non-icteric sclera, EOMI  Ears, Nose, Mouth, and Throat: External ears and nose appear to be without deformity or rash. No lesions or masses noted. Hearing is grossly intact.   Neck: Trachea midline  Respiratory: Nonlabored breathing   Cardiovascular: no peripheral edema   Skin: No rashes or open lesions/ulcers identified on skin.    Back:   Palpation: tenderness to palpation over lumbar paraspinous muscles. Tenderness at left L4-L5 and L5-S1 facet  Straight leg raise: does not reproduce their pain, bilaterally   SAMMY Maneuver does not reproduce pain bilaterally    Hip: No pain over greater trochanters. and Pain not reproduced with hip internal/external rotation.     Neurologic:   Cranial nerves grossly intact.   Strength 5/5 and symmetric plantar/dorsiflexion   Sensation: Normal to light touch throughout, pinprick  intact throughout.  DTRs:normal and symmetric throughout  Dominguez: absent  Clonus: absent    Psychiatric: Alert, orientation to person, place, and time. Cooperative.    Imaging personally reviewed and independently interpreted: LUMBAR SPINE RADIOGRAPHS     HISTORY: Back pain     TECHNIQUE: 3 views of the lumbar spine are submitted.     COMPARISON: No available comparisons.     RESULT:     Counting reference: Lumbosacral junction. For the purposes of this   report, L4-5 is considered the level of the iliac crest and there are 5   lumbar-type vertebrae.     Counting reference:   Lumbosacral junction.  For the purposes of this   report,  L4-5 is considered the level of the iliac crest and assume there   are 5 lumbar-type vertebrae.  Anatomic variant:  None.     Sagittal alignment is normal.  Vertebral body heights are   well-maintained.  Minimal degenerative disc space narrowing is noted at   L1-L2, L2-L3, and L3-L4.  Disc space heights are otherwise   well-maintained.  SI joints are unremarkable.          Assessment/Plan   24M with left lower back pain. Pain does not seem radicular in nature, physical exam suggests inflammation of left L4-L5 and L5-S1 facet joints. Under sterile technique dry needling was done of the L4-L5 and L5-S1 facet joints. Patient tolerated procedure well. We recommend patient continues with PT and get his lumbar MRI  Plan:  -Continue with physical therapy  -MRI Lumbar spine following completion of physical therapy      Follow up: After MRI    The patient was invited to contact us back anytime with any questions or concerns and follow-up with us in the office as needed.     Diagnoses and all orders for this visit:  Chronic low back pain, unspecified back pain laterality, unspecified whether sciatica present  -     Referral to Pain Medicine      This note was generated with the aid of dictation software, there may be typos despite my attempts at proofreading.    Kayden Howard MD  Anesthesia Resident CA-1

## 2025-04-08 ENCOUNTER — TELEPHONE (OUTPATIENT)
Dept: PRIMARY CARE | Facility: CLINIC | Age: 25
End: 2025-04-08
Payer: COMMERCIAL

## 2025-04-08 NOTE — LETTER
April 8, 2025     Patient: Facundo Thomas   YOB: 2000   Date of Visit: 4/8/2025       To Whom It May Concern  Fer Thomas was seen in my clinic on 4/8/2025  Please excuse Facundo for his absence from work on this day to make the appointment.    If you have any questions or concerns, please don't hesitate to call.         Sincerely,         Дмитрий Bill, DO

## 2025-04-10 NOTE — PROGRESS NOTES
"Physical Therapy    Discharge Summary    Name: Facundo Thomas  MRN: [id number]  : 2000  Date: 25    Discharge Summary: PT    Discharge Information: Date of discharge 25    Therapy Summary: Focus on Back strengthening    Discharge Status: Stable     Rehab Discharge Reason: Patient requested due to requiring MRI for back      Patient Name: Facundo Thomas  MRN: [id number]  Today's Date: 2025  Time Calculation  Start Time: 401  Stop Time: 424  Time Calculation (min): 23 min     PT Therapeutic Procedures Time Entry  Therapeutic Exercise Time Entry:                  Current Problem  No diagnosis found.      Insurance:  COPAY 0 DED 0  Visit number:   Authorization info: NO AUTH REQ  Insurance Type: [employer] MEDICAID 30V THEN AUTH IS REQ       Precautions   Per MR    Subjective   Subjective:   Pt reports that his pain is still located in the L low back.  Pain  Pain Assessment: 0-10  0-10 (Numeric) Pain Score: 4  Pain Type: Chronic pain    Objective   Other Measures  Oswestry Disablity Index (JING): 8      LUMBAR AROM  FF 40  EX 30  RSB 30  LSB 20      Treatments:Including measurements   Therapeutic Exercise (36682):   BHUMIKA 6'    Not Today:  Prone SLR x10 BL   Seated PB FF 5\" x 15   Seated PB R/L 5\" x 5 ea  *TA w/ Hands push into plinth 5\" x 15  *TA w/ Marching x10   *TA w/ Hip add 5\" x 15  *TA w/ Hip abd BlueTB 5\" x 20   *Supine HS Stretch w/ Strap 30\" x 3 BL  *Supine Piriformis Stretch (KTOS) 30\" x 2 BL  *Supine Fig 4 Stretch 30\" x 2 BL --  Clamshells x10 BL  Bridges x10   Prone Lying 3' --  REBECCA 15x --  Prone Press Up 2x10 --  Seated TA bracing 5\"x15  Seated hip add 5\"x15  Standing UE push into PB 5\"x20  Standing row/ext RTB   Standing antirotations YTB B 10x ea  Sit to stands 10x    (*Added to HEP)   OP EDUCATION:   Correct technique when bending over to pick things up. Correct sitting posture    Goals:   Increase ROM to WFL of LB  Increase Strength where deficits noted by 1/3 to 1 mm grade "   Ind w/ HEP for LB to expedite progress and promote goal achievement for pt.    Improve Outcome score for evidence of improved function of LB.  Return to PLOF   Decrease pain to 2/10 or less      Assessment:  Pt feels he is ready for DC from PT at this time.  Overall demonstrated little gains w/ therapy intervention.  MRI  may reveal where to focus any future therapy for pt.     Plan:  Pt to be Dc'd from PT this date and follow up w/ MD PRN or as scheduled.

## 2025-04-14 ENCOUNTER — TREATMENT (OUTPATIENT)
Dept: PHYSICAL THERAPY | Facility: CLINIC | Age: 25
End: 2025-04-14
Payer: COMMERCIAL

## 2025-04-14 DIAGNOSIS — G89.29 CHRONIC LEFT-SIDED LOW BACK PAIN WITH SCIATICA, SCIATICA LATERALITY UNSPECIFIED: Primary | ICD-10-CM

## 2025-04-14 DIAGNOSIS — M54.40 CHRONIC LEFT-SIDED LOW BACK PAIN WITH SCIATICA, SCIATICA LATERALITY UNSPECIFIED: Primary | ICD-10-CM

## 2025-04-14 PROCEDURE — 97110 THERAPEUTIC EXERCISES: CPT | Mod: GP | Performed by: PHYSICAL THERAPIST

## 2025-04-14 ASSESSMENT — PAIN SCALES - GENERAL: PAINLEVEL_OUTOF10: 4

## 2025-04-14 ASSESSMENT — PAIN - FUNCTIONAL ASSESSMENT: PAIN_FUNCTIONAL_ASSESSMENT: 0-10

## 2025-04-17 ENCOUNTER — OFFICE VISIT (OUTPATIENT)
Dept: ORTHOPEDIC SURGERY | Facility: CLINIC | Age: 25
End: 2025-04-17
Payer: COMMERCIAL

## 2025-04-17 DIAGNOSIS — M54.16 LUMBAR RADICULOPATHY: Primary | ICD-10-CM

## 2025-04-17 PROCEDURE — 99213 OFFICE O/P EST LOW 20 MIN: CPT | Performed by: PHYSICIAN ASSISTANT

## 2025-04-17 NOTE — PROGRESS NOTES
Established patient for follow-up after physical therapy patient was having back pain and left leg symptoms.  He finished a physical therapy and actually is made his symptoms worse.  Still having the exact same symptoms.  No bowel or bladder complaints was able anesthesia.    Physical exam: Well-nourished, well kept.  No lymphangitis or lymphadenopathy in the examined extremities.  Good perfusion to the extremities ×4.  Radial and dorsalis pedis pulses 2+.  Capillary refill to all 4 digits brisk.  No distal edema x 4.  Patient ambulated with no major difficulty some decreased range of motion flexion-extension rotation lumbar spine good strength no instability moving lower extremities with any difficulty motor strength intact no redness, abrasions, or lesions on all 4 extremities, head and neck, or trunk.  Patient neurologically intact    Assessment: The patient with persistent low back pain to left leg symptoms.  Not relieved after physical therapy over-the-counter meds or prescription meds.  Affecting his bodily function needs further workup.    Plan: Will resubmit for a MRI lumbar spine at our [facility] facility no follow-up after ultrasound

## 2025-05-01 ENCOUNTER — APPOINTMENT (OUTPATIENT)
Dept: PRIMARY CARE | Facility: CLINIC | Age: 25
End: 2025-05-01
Payer: COMMERCIAL

## 2025-05-01 DIAGNOSIS — F32.9 MAJOR DEPRESSIVE DISORDER, REMISSION STATUS UNSPECIFIED, UNSPECIFIED WHETHER RECURRENT: Primary | ICD-10-CM

## 2025-05-01 PROCEDURE — 99213 OFFICE O/P EST LOW 20 MIN: CPT | Performed by: FAMILY MEDICINE

## 2025-05-01 ASSESSMENT — ENCOUNTER SYMPTOMS
DEPRESSION: 1
SLEEP DISTURBANCE: 0

## 2025-05-01 NOTE — PROGRESS NOTES
Subjective   Patient ID: Facundo Thomas is a 24 y.o. male who presents for No chief complaint on file..    Depression     Virtual or Telephone Consent    An interactive audio and video telecommunication system which permits real time communications between the patient (at the originating site) and provider (at the distant site) was utilized to provide this telehealth service.   Verbal consent was requested and obtained from Facundo Thomas on this date, 05/01/25 for a telehealth visit and the patient's location was confirmed at the time of the visit.      He is here today for 1 month follow-up.  He has a history of MDD and bipolar disorder and is currently taking aripiprazole 15 mg daily  At his last visit 1 month ago he had reported a 2-week history of worsening depression symptoms, and his dose of aripiprazole was increased from 10 to 15 mg daily at that time  He feels that this dose is working much better for him.  He has been lashing out less often.  He feels that his mood is better.  He has been less depressed.  Does not feel sad.  He has been sleeping well.  Denies any appetite loss, significant energy loss, or significant loss of motivation or interest.  Denies any manic episodes or mood swings.  No adverse effects with medication.  He feels this dose is working well for him        Review of Systems   Psychiatric/Behavioral:  Positive for depression. Negative for sleep disturbance.        Objective   There were no vitals taken for this visit.    Physical Exam  Constitutional:       General: He is not in acute distress.     Appearance: Normal appearance. He is well-developed.   Pulmonary:      Effort: Pulmonary effort is normal.   Skin:     Findings: No rash.   Neurological:      Mental Status: He is alert.   Psychiatric:         Mood and Affect: Mood normal.         Behavior: Behavior normal.         Assessment/Plan   Assessment & Plan  Major depressive disorder, remission status unspecified, unspecified whether  recurrent         He is overall doing better since increasing dose of aripiprazole to 15 mg daily 1 month ago.  His depression symptoms have improved and he denies any mood swings or manic episodes.  We will continue at current dose and follow-up if any worsening depression symptoms prior to next appointment

## 2025-05-12 ENCOUNTER — APPOINTMENT (OUTPATIENT)
Dept: RADIOLOGY | Facility: HOSPITAL | Age: 25
End: 2025-05-12
Payer: COMMERCIAL

## 2025-07-02 DIAGNOSIS — R25.2 LEG CRAMPS: ICD-10-CM

## 2025-07-02 RX ORDER — IBUPROFEN 800 MG/1
800 TABLET, FILM COATED ORAL
Qty: 30 TABLET | Refills: 0 | Status: SHIPPED | OUTPATIENT
Start: 2025-07-02

## 2025-07-02 NOTE — TELEPHONE ENCOUNTER
Rx Refill Request Telephone Encounter    Name:  Facundo Thomas  :  [id number]  Medication Name:  ibuprofen 800 mg tablet             Specific Pharmacy location:  [facility]   Date of last appointment:  na  Date of next appointment:  na  Best number to reach patient:  na

## 2025-07-02 NOTE — TELEPHONE ENCOUNTER
Patient was seen in ER yesterday for swollen throat.  They told him to call his PCP because they didn't know what antibiotic to give him due to his allergic reactions to antibiotics    He is asking for something to be called in.    Please Advise    [phone]

## 2025-07-03 ENCOUNTER — APPOINTMENT (OUTPATIENT)
Dept: RADIOLOGY | Facility: HOSPITAL | Age: 25
End: 2025-07-03
Payer: COMMERCIAL

## 2025-07-21 ENCOUNTER — OFFICE VISIT (OUTPATIENT)
Dept: PRIMARY CARE | Facility: CLINIC | Age: 25
End: 2025-07-21
Payer: COMMERCIAL

## 2025-07-21 VITALS
SYSTOLIC BLOOD PRESSURE: 141 MMHG | TEMPERATURE: 97.6 F | DIASTOLIC BLOOD PRESSURE: 88 MMHG | HEART RATE: 83 BPM | OXYGEN SATURATION: 96 %

## 2025-07-21 DIAGNOSIS — G89.29 CHRONIC LOW BACK PAIN, UNSPECIFIED BACK PAIN LATERALITY, UNSPECIFIED WHETHER SCIATICA PRESENT: Primary | ICD-10-CM

## 2025-07-21 DIAGNOSIS — M54.50 CHRONIC LOW BACK PAIN, UNSPECIFIED BACK PAIN LATERALITY, UNSPECIFIED WHETHER SCIATICA PRESENT: Primary | ICD-10-CM

## 2025-07-21 PROCEDURE — 99213 OFFICE O/P EST LOW 20 MIN: CPT | Performed by: FAMILY MEDICINE

## 2025-07-21 RX ORDER — IBUPROFEN 800 MG/1
800 TABLET, FILM COATED ORAL EVERY 8 HOURS PRN
Qty: 30 TABLET | Refills: 0 | Status: SHIPPED | OUTPATIENT
Start: 2025-07-21

## 2025-07-21 ASSESSMENT — ENCOUNTER SYMPTOMS
BACK PAIN: 1
NUMBNESS: 0
WEAKNESS: 0

## 2025-07-21 NOTE — PROGRESS NOTES
Subjective   Patient ID: Facundo Thomas is a 25 y.o. male who presents for Back Pain.    Back Pain  This is a recurrent problem. The quality of the pain is described as aching and burning. Radiates to: left leg. The pain is at a severity of 8/10. Pertinent negatives include no numbness or weakness.        He is here today to follow-up on chronic low back pain  He has a history of chronic low back pain which has been present for greater than 1 year.  He has been following with orthopedics for this and an MRI has been ordered.  He had to reschedule due to insurance, but states he is scheduled to get this done on 8/5  He has done PT without improvement  Previous x-ray lumbar spine done 3/11/2025 showed mild degenerative changes  Back pain has gotten worse over the past week.  No recent injury or change in activity.  Pain is located bilateral lower back.  He describes as an aching and burning pain which is 8 out of 10 intensity.  This gets worse with both sitting and standing.  Pain radiates down his left leg to the level of the knee.  No lower extremity numbness, tingling or weakness.  No fever or abdominal pain.  Nothing to treat.  He does mention that he saw pain management also and they recommended that he get his MRI prior to making any other recommendations    Review of Systems   Musculoskeletal:  Positive for back pain.   Neurological:  Negative for weakness and numbness.       Objective   /88   Pulse 83   Temp 36.4 °C (97.6 °F) (Temporal)   SpO2 96%     Physical Exam  Vitals reviewed.   Constitutional:       General: He is not in acute distress.     Appearance: Normal appearance. He is well-developed.   HENT:      Head: Normocephalic.     Eyes:      Conjunctiva/sclera: Conjunctivae normal.       Cardiovascular:      Rate and Rhythm: Normal rate and regular rhythm.      Heart sounds: Normal heart sounds.   Pulmonary:      Effort: Pulmonary effort is normal.      Breath sounds: Normal breath sounds.      Musculoskeletal:         General: Tenderness present.      Right lower leg: No edema.      Left lower leg: No edema.      Comments: Tenderness involving bilateral L2-L5 lumbar paraspinals.  Lower extremity strength is plus 5 out of 5 bilaterally.  Patellar and Achilles reflexes plus 2 out of 4 bilaterally     Skin:     Findings: No rash.     Neurological:      Mental Status: He is alert.     Psychiatric:         Mood and Affect: Mood normal.         Behavior: Behavior normal.         Assessment/Plan   Assessment & Plan  Chronic low back pain, unspecified back pain laterality, unspecified whether sciatica present    Orders:    ibuprofen 800 mg tablet; Take 1 tablet (800 mg) by mouth every 8 hours if needed for mild pain (1 - 3).    He presents today with chronic low back pain which has gotten worse over the past week.  We will start ibuprofen 800 mg every 8 hours as needed.  He is scheduled to have an MRI done in approximately 2 weeks.  Continue to follow with orthopedics and pain management, and recommended scheduling a follow-up if any persistent symptoms after seeing specialists  His blood pressure today was mildly elevated at 141/88.  138/90 on recheck.  This most likely elevated due to pain.  Will plan on rechecking his blood pressure at next appointment

## 2025-08-05 ENCOUNTER — APPOINTMENT (OUTPATIENT)
Dept: RADIOLOGY | Facility: CLINIC | Age: 25
End: 2025-08-05
Payer: COMMERCIAL